# Patient Record
Sex: MALE | Race: WHITE | Employment: FULL TIME | URBAN - METROPOLITAN AREA
[De-identification: names, ages, dates, MRNs, and addresses within clinical notes are randomized per-mention and may not be internally consistent; named-entity substitution may affect disease eponyms.]

---

## 2018-04-30 ENCOUNTER — APPOINTMENT (OUTPATIENT)
Dept: CT IMAGING | Age: 48
End: 2018-04-30
Payer: OTHER MISCELLANEOUS

## 2018-04-30 ENCOUNTER — HOSPITAL ENCOUNTER (EMERGENCY)
Age: 48
Discharge: HOME | End: 2018-04-30
Payer: OTHER MISCELLANEOUS

## 2018-04-30 ENCOUNTER — APPOINTMENT (OUTPATIENT)
Dept: GENERAL RADIOLOGY | Age: 48
End: 2018-04-30
Payer: OTHER MISCELLANEOUS

## 2018-04-30 VITALS
WEIGHT: 295 LBS | TEMPERATURE: 98.2 F | HEART RATE: 72 BPM | HEIGHT: 72 IN | OXYGEN SATURATION: 96 % | DIASTOLIC BLOOD PRESSURE: 67 MMHG | RESPIRATION RATE: 20 BRPM | BODY MASS INDEX: 39.96 KG/M2 | SYSTOLIC BLOOD PRESSURE: 112 MMHG

## 2018-04-30 DIAGNOSIS — S80.212A ABRASION OF LEFT KNEE, INITIAL ENCOUNTER: ICD-10-CM
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
## 2018-04-30 DIAGNOSIS — S20.219A CONTUSION OF CHEST WALL, UNSPECIFIED LATERALITY, INITIAL ENCOUNTER: ICD-10-CM

## 2018-04-30 DIAGNOSIS — R55 VASOVAGAL SYNCOPE: Primary | ICD-10-CM

## 2018-04-30 LAB
ALBUMIN SERPL-MCNC: 4.1 G/DL (ref 3.9–4.9)
ALP BLD-CCNC: 75 U/L (ref 35–104)
ALT SERPL-CCNC: 42 U/L (ref 0–41)
ANION GAP SERPL CALCULATED.3IONS-SCNC: 13 MEQ/L (ref 7–13)
AST SERPL-CCNC: 32 U/L (ref 0–40)
BASOPHILS ABSOLUTE: 0.2 K/UL (ref 0–0.2)
BASOPHILS RELATIVE PERCENT: 1.3 %
BILIRUB SERPL-MCNC: 0.4 MG/DL (ref 0–1.2)
BILIRUBIN URINE: NEGATIVE
BLOOD, URINE: NEGATIVE
BUN BLDV-MCNC: 14 MG/DL (ref 6–20)
CALCIUM SERPL-MCNC: 9.2 MG/DL (ref 8.6–10.2)
CHLORIDE BLD-SCNC: 102 MEQ/L (ref 98–107)
CK MB: 7 NG/ML (ref 0–6.7)
CLARITY: CLEAR
CO2: 25 MEQ/L (ref 22–29)
COLOR: YELLOW
CREAT SERPL-MCNC: 0.92 MG/DL (ref 0.7–1.2)
CREATINE KINASE-MB INDEX: 1.4 % (ref 0–3.5)
EKG ATRIAL RATE: 79 BPM
EKG P AXIS: 64 DEGREES
EKG P-R INTERVAL: 180 MS
EKG Q-T INTERVAL: 352 MS
EKG QRS DURATION: 96 MS
EKG QTC CALCULATION (BAZETT): 403 MS
EKG R AXIS: -49 DEGREES
EKG T AXIS: 43 DEGREES
EKG VENTRICULAR RATE: 79 BPM
EOSINOPHILS ABSOLUTE: 0.1 K/UL (ref 0–0.7)
EOSINOPHILS RELATIVE PERCENT: 0.8 %
ETHANOL PERCENT: NORMAL G/DL
ETHANOL: <10 MG/DL (ref 0–0.08)
GFR AFRICAN AMERICAN: >60
GFR NON-AFRICAN AMERICAN: >60
GLOBULIN: 2.4 G/DL (ref 2.3–3.5)
GLUCOSE BLD-MCNC: 170 MG/DL (ref 74–109)
GLUCOSE URINE: NEGATIVE MG/DL
HCT VFR BLD CALC: 45.1 % (ref 42–52)
HEMOGLOBIN: 15.7 G/DL (ref 14–18)
KETONES, URINE: NEGATIVE MG/DL
LACTIC ACID: 1.7 MMOL/L (ref 0.5–2.2)
LEUKOCYTE ESTERASE, URINE: NEGATIVE
LIPASE: 32 U/L (ref 13–60)
LYMPHOCYTES ABSOLUTE: 3.3 K/UL (ref 1–4.8)
LYMPHOCYTES RELATIVE PERCENT: 25.8 %
MAGNESIUM: 2 MG/DL (ref 1.7–2.3)
MCH RBC QN AUTO: 34.1 PG (ref 27–31.3)
MCHC RBC AUTO-ENTMCNC: 34.9 % (ref 33–37)
MCV RBC AUTO: 97.7 FL (ref 80–100)
MONOCYTES ABSOLUTE: 1 K/UL (ref 0.2–0.8)
MONOCYTES RELATIVE PERCENT: 8.2 %
NEUTROPHILS ABSOLUTE: 8.1 K/UL (ref 1.4–6.5)
NEUTROPHILS RELATIVE PERCENT: 63.9 %
NITRITE, URINE: NEGATIVE
PDW BLD-RTO: 13.3 % (ref 11.5–14.5)
PH UA: 5 (ref 5–9)
PLATELET # BLD: 172 K/UL (ref 130–400)
POTASSIUM SERPL-SCNC: 4 MEQ/L (ref 3.5–5.1)
PROTEIN UA: NEGATIVE MG/DL
RBC # BLD: 4.61 M/UL (ref 4.7–6.1)
SODIUM BLD-SCNC: 140 MEQ/L (ref 132–144)
SPECIFIC GRAVITY UA: 1.02 (ref 1–1.03)
TOTAL CK: 488 U/L (ref 0–190)
TOTAL PROTEIN: 6.5 G/DL (ref 6.4–8.1)
TROPONIN: <0.01 NG/ML (ref 0–0.01)
URINE REFLEX TO CULTURE: NORMAL
UROBILINOGEN, URINE: 1 E.U./DL
WBC # BLD: 12.7 K/UL (ref 4.8–10.8)

## 2018-04-30 PROCEDURE — 81003 URINALYSIS AUTO W/O SCOPE: CPT

## 2018-04-30 PROCEDURE — 70450 CT HEAD/BRAIN W/O DYE: CPT

## 2018-04-30 PROCEDURE — 83735 ASSAY OF MAGNESIUM: CPT

## 2018-04-30 PROCEDURE — 73562 X-RAY EXAM OF KNEE 3: CPT

## 2018-04-30 PROCEDURE — 82550 ASSAY OF CK (CPK): CPT

## 2018-04-30 PROCEDURE — 93005 ELECTROCARDIOGRAM TRACING: CPT

## 2018-04-30 PROCEDURE — 82553 CREATINE MB FRACTION: CPT

## 2018-04-30 PROCEDURE — 83690 ASSAY OF LIPASE: CPT

## 2018-04-30 PROCEDURE — 2580000003 HC RX 258: Performed by: PHYSICIAN ASSISTANT

## 2018-04-30 PROCEDURE — 96372 THER/PROPH/DIAG INJ SC/IM: CPT

## 2018-04-30 PROCEDURE — 99284 EMERGENCY DEPT VISIT MOD MDM: CPT

## 2018-04-30 PROCEDURE — 84484 ASSAY OF TROPONIN QUANT: CPT

## 2018-04-30 PROCEDURE — G0480 DRUG TEST DEF 1-7 CLASSES: HCPCS

## 2018-04-30 PROCEDURE — 36415 COLL VENOUS BLD VENIPUNCTURE: CPT

## 2018-04-30 PROCEDURE — 6370000000 HC RX 637 (ALT 250 FOR IP): Performed by: PHYSICIAN ASSISTANT

## 2018-04-30 PROCEDURE — 83605 ASSAY OF LACTIC ACID: CPT

## 2018-04-30 PROCEDURE — 85025 COMPLETE CBC W/AUTO DIFF WBC: CPT

## 2018-04-30 PROCEDURE — 71046 X-RAY EXAM CHEST 2 VIEWS: CPT

## 2018-04-30 PROCEDURE — 80053 COMPREHEN METABOLIC PANEL: CPT

## 2018-04-30 PROCEDURE — 6360000002 HC RX W HCPCS: Performed by: PHYSICIAN ASSISTANT

## 2018-04-30 RX ORDER — 0.9 % SODIUM CHLORIDE 0.9 %
1000 INTRAVENOUS SOLUTION INTRAVENOUS ONCE
Status: COMPLETED | OUTPATIENT
Start: 2018-04-30 | End: 2018-04-30

## 2018-04-30 RX ORDER — DIAPER,BRIEF,INFANT-TODD,DISP
EACH MISCELLANEOUS ONCE
Status: COMPLETED | OUTPATIENT
Start: 2018-04-30 | End: 2018-04-30

## 2018-04-30 RX ORDER — ETODOLAC 400 MG/1
400 TABLET, FILM COATED ORAL 2 TIMES DAILY
Qty: 14 TABLET | Refills: 0 | Status: SHIPPED | OUTPATIENT
Start: 2018-04-30 | End: 2018-05-02

## 2018-04-30 RX ORDER — KETOROLAC TROMETHAMINE 30 MG/ML
60 INJECTION, SOLUTION INTRAMUSCULAR; INTRAVENOUS ONCE
Status: COMPLETED | OUTPATIENT
Start: 2018-04-30 | End: 2018-04-30

## 2018-04-30 RX ADMIN — KETOROLAC TROMETHAMINE 60 MG: 30 INJECTION, SOLUTION INTRAMUSCULAR at 22:27

## 2018-04-30 RX ADMIN — SODIUM CHLORIDE 1000 ML: 9 INJECTION, SOLUTION INTRAVENOUS at 21:25

## 2018-04-30 RX ADMIN — BACITRACIN ZINC 1 G: 500 OINTMENT TOPICAL at 22:27

## 2018-04-30 ASSESSMENT — ENCOUNTER SYMPTOMS
ABDOMINAL PAIN: 0
COLOR CHANGE: 0
SHORTNESS OF BREATH: 0
EYE PAIN: 0
TROUBLE SWALLOWING: 0
APNEA: 0
ALLERGIC/IMMUNOLOGIC NEGATIVE: 1

## 2018-04-30 ASSESSMENT — PAIN SCALES - GENERAL
PAINLEVEL_OUTOF10: 4
PAINLEVEL_OUTOF10: 4

## 2018-04-30 ASSESSMENT — PAIN DESCRIPTION - LOCATION: LOCATION: CHEST

## 2018-04-30 ASSESSMENT — PAIN DESCRIPTION - PAIN TYPE: TYPE: ACUTE PAIN

## 2018-05-01 ENCOUNTER — OFFICE VISIT (OUTPATIENT)
Dept: CARDIOLOGY CLINIC | Age: 48
End: 2018-05-01
Payer: COMMERCIAL

## 2018-05-01 VITALS
WEIGHT: 295.8 LBS | BODY MASS INDEX: 40.06 KG/M2 | HEART RATE: 74 BPM | HEIGHT: 72 IN | RESPIRATION RATE: 20 BRPM | SYSTOLIC BLOOD PRESSURE: 116 MMHG | DIASTOLIC BLOOD PRESSURE: 66 MMHG | TEMPERATURE: 97.1 F | OXYGEN SATURATION: 93 %

## 2018-05-01 DIAGNOSIS — R55 VASOVAGAL SYNCOPE: Primary | ICD-10-CM

## 2018-05-01 PROCEDURE — 93010 ELECTROCARDIOGRAM REPORT: CPT | Performed by: INTERNAL MEDICINE

## 2018-05-01 PROCEDURE — 99204 OFFICE O/P NEW MOD 45 MIN: CPT | Performed by: INTERNAL MEDICINE

## 2018-05-01 RX ORDER — FLUDROCORTISONE ACETATE 0.1 MG/1
0.1 TABLET ORAL 2 TIMES DAILY
Qty: 60 TABLET | Refills: 5 | Status: SHIPPED | OUTPATIENT
Start: 2018-05-01 | End: 2019-02-01

## 2018-05-01 RX ORDER — METOPROLOL SUCCINATE 25 MG/1
25 TABLET, EXTENDED RELEASE ORAL NIGHTLY
Qty: 30 TABLET | Refills: 5 | Status: SHIPPED | OUTPATIENT
Start: 2018-05-01 | End: 2018-10-29 | Stop reason: SDUPTHER

## 2018-05-01 ASSESSMENT — ENCOUNTER SYMPTOMS
SHORTNESS OF BREATH: 0
NAUSEA: 0
APNEA: 0
VOMITING: 0
CHEST TIGHTNESS: 0

## 2018-05-02 ENCOUNTER — OFFICE VISIT (OUTPATIENT)
Dept: FAMILY MEDICINE CLINIC | Age: 48
End: 2018-05-02
Payer: COMMERCIAL

## 2018-05-02 VITALS
WEIGHT: 294 LBS | HEART RATE: 70 BPM | HEIGHT: 72 IN | TEMPERATURE: 98.9 F | RESPIRATION RATE: 14 BRPM | DIASTOLIC BLOOD PRESSURE: 80 MMHG | SYSTOLIC BLOOD PRESSURE: 132 MMHG | BODY MASS INDEX: 39.82 KG/M2

## 2018-05-02 DIAGNOSIS — Z13.220 SCREENING, LIPID: ICD-10-CM

## 2018-05-02 DIAGNOSIS — R55 VASOVAGAL SYNCOPE: Primary | ICD-10-CM

## 2018-05-02 DIAGNOSIS — R53.83 OTHER FATIGUE: ICD-10-CM

## 2018-05-02 DIAGNOSIS — Z99.89 OSA ON CPAP: ICD-10-CM

## 2018-05-02 DIAGNOSIS — G47.33 OSA ON CPAP: ICD-10-CM

## 2018-05-02 DIAGNOSIS — R55 VASOVAGAL SYNCOPE: ICD-10-CM

## 2018-05-02 DIAGNOSIS — R73.01 ELEVATED FASTING GLUCOSE: ICD-10-CM

## 2018-05-02 DIAGNOSIS — G56.03 BILATERAL CARPAL TUNNEL SYNDROME: ICD-10-CM

## 2018-05-02 LAB
ALBUMIN SERPL-MCNC: 4 G/DL (ref 3.9–4.9)
ALP BLD-CCNC: 76 U/L (ref 35–104)
ALT SERPL-CCNC: 33 U/L (ref 0–41)
ANION GAP SERPL CALCULATED.3IONS-SCNC: 14 MEQ/L (ref 7–13)
AST SERPL-CCNC: 17 U/L (ref 0–40)
BILIRUB SERPL-MCNC: 0.4 MG/DL (ref 0–1.2)
BUN BLDV-MCNC: 13 MG/DL (ref 6–20)
CALCIUM SERPL-MCNC: 9.1 MG/DL (ref 8.6–10.2)
CHLORIDE BLD-SCNC: 102 MEQ/L (ref 98–107)
CHOLESTEROL, TOTAL: 206 MG/DL (ref 0–199)
CO2: 26 MEQ/L (ref 22–29)
CREAT SERPL-MCNC: 0.85 MG/DL (ref 0.7–1.2)
GFR AFRICAN AMERICAN: >60
GFR NON-AFRICAN AMERICAN: >60
GLOBULIN: 2.4 G/DL (ref 2.3–3.5)
GLUCOSE BLD-MCNC: 178 MG/DL (ref 74–109)
HBA1C MFR BLD: 8.3 % (ref 4.8–5.9)
HDLC SERPL-MCNC: 36 MG/DL (ref 40–59)
LDL CHOLESTEROL CALCULATED: 138 MG/DL (ref 0–129)
POTASSIUM SERPL-SCNC: 4.2 MEQ/L (ref 3.5–5.1)
SODIUM BLD-SCNC: 142 MEQ/L (ref 132–144)
TOTAL PROTEIN: 6.4 G/DL (ref 6.4–8.1)
TRIGL SERPL-MCNC: 158 MG/DL (ref 0–200)
TSH REFLEX: 2.04 UIU/ML (ref 0.27–4.2)

## 2018-05-02 PROCEDURE — 99204 OFFICE O/P NEW MOD 45 MIN: CPT | Performed by: NURSE PRACTITIONER

## 2018-05-02 ASSESSMENT — ENCOUNTER SYMPTOMS
SWOLLEN GLANDS: 0
ABDOMINAL PAIN: 0
VOMITING: 0
VISUAL CHANGE: 0
SORE THROAT: 0
NAUSEA: 0
COUGH: 0
CHANGE IN BOWEL HABIT: 0

## 2018-05-02 ASSESSMENT — PATIENT HEALTH QUESTIONNAIRE - PHQ9
2. FEELING DOWN, DEPRESSED OR HOPELESS: 0
1. LITTLE INTEREST OR PLEASURE IN DOING THINGS: 0
SUM OF ALL RESPONSES TO PHQ9 QUESTIONS 1 & 2: 0
SUM OF ALL RESPONSES TO PHQ QUESTIONS 1-9: 0

## 2018-05-04 ENCOUNTER — TELEPHONE (OUTPATIENT)
Dept: CARDIOLOGY CLINIC | Age: 48
End: 2018-05-04

## 2018-05-04 ENCOUNTER — OFFICE VISIT (OUTPATIENT)
Dept: FAMILY MEDICINE CLINIC | Age: 48
End: 2018-05-04
Payer: COMMERCIAL

## 2018-05-04 DIAGNOSIS — E11.9 TYPE 2 DIABETES MELLITUS WITHOUT COMPLICATION, WITHOUT LONG-TERM CURRENT USE OF INSULIN (HCC): Primary | ICD-10-CM

## 2018-05-04 DIAGNOSIS — E11.9 TYPE 2 DIABETES MELLITUS WITHOUT COMPLICATION, WITHOUT LONG-TERM CURRENT USE OF INSULIN (HCC): ICD-10-CM

## 2018-05-04 DIAGNOSIS — R79.89 LOW TESTOSTERONE IN MALE: ICD-10-CM

## 2018-05-04 LAB
DHEAS (DHEA SULFATE): 212 UG/DL (ref 95–530)
SEX HORMONE BINDING GLOBULIN: 20 NMOL/L (ref 11–80)
TESTOSTERONE FREE PERCENT: 2.2 % (ref 1.6–2.9)
TESTOSTERONE FREE, CALC: 47 PG/ML (ref 47–244)
TESTOSTERONE TOTAL-MALE: 211 NG/DL (ref 300–890)

## 2018-05-04 PROCEDURE — 96372 THER/PROPH/DIAG INJ SC/IM: CPT | Performed by: NURSE PRACTITIONER

## 2018-05-04 PROCEDURE — 99214 OFFICE O/P EST MOD 30 MIN: CPT | Performed by: NURSE PRACTITIONER

## 2018-05-04 RX ORDER — METFORMIN HYDROCHLORIDE 500 MG/1
500 TABLET, EXTENDED RELEASE ORAL
Qty: 60 TABLET | Refills: 3 | Status: SHIPPED | OUTPATIENT
Start: 2018-05-04 | End: 2018-12-27 | Stop reason: SDUPTHER

## 2018-05-04 RX ORDER — TESTOSTERONE CYPIONATE 200 MG/ML
200 INJECTION INTRAMUSCULAR ONCE
Status: COMPLETED | OUTPATIENT
Start: 2018-05-04 | End: 2018-05-04

## 2018-05-04 RX ADMIN — TESTOSTERONE CYPIONATE 200 MG: 200 INJECTION INTRAMUSCULAR at 13:59

## 2018-05-05 LAB
CREATININE URINE: 285.1 MG/DL
MICROALBUMIN UR-MCNC: <1.2 MG/DL
MICROALBUMIN/CREAT UR-RTO: NORMAL MG/G (ref 0–30)

## 2018-05-06 LAB — 17-OH PROGESTERONE LCMS: 27.3 NG/DL

## 2018-05-14 VITALS
BODY MASS INDEX: 39.82 KG/M2 | DIASTOLIC BLOOD PRESSURE: 70 MMHG | SYSTOLIC BLOOD PRESSURE: 130 MMHG | RESPIRATION RATE: 14 BRPM | HEIGHT: 72 IN | WEIGHT: 294 LBS | OXYGEN SATURATION: 98 % | HEART RATE: 83 BPM | TEMPERATURE: 99 F

## 2018-05-14 ASSESSMENT — ENCOUNTER SYMPTOMS
SORE THROAT: 0
SWOLLEN GLANDS: 0
VISUAL CHANGE: 0
NAUSEA: 0
VOMITING: 0
ABDOMINAL PAIN: 0
COUGH: 0
CHANGE IN BOWEL HABIT: 0

## 2018-05-15 ENCOUNTER — HOSPITAL ENCOUNTER (OUTPATIENT)
Dept: NUCLEAR MEDICINE | Age: 48
Discharge: HOME | End: 2018-05-17
Payer: COMMERCIAL

## 2018-05-15 ENCOUNTER — HOSPITAL ENCOUNTER (OUTPATIENT)
Dept: NON INVASIVE DIAGNOSTICS | Age: 48
Discharge: HOME | End: 2018-05-15
Payer: COMMERCIAL

## 2018-05-15 DIAGNOSIS — R55 VASOVAGAL SYNCOPE: ICD-10-CM

## 2018-05-15 LAB
LV EF: 60 %
LVEF MODALITY: NORMAL

## 2018-05-15 PROCEDURE — 3430000000 HC RX DIAGNOSTIC RADIOPHARMACEUTICAL: Performed by: INTERNAL MEDICINE

## 2018-05-15 PROCEDURE — 93306 TTE W/DOPPLER COMPLETE: CPT

## 2018-05-15 PROCEDURE — A9502 TC99M TETROFOSMIN: HCPCS | Performed by: INTERNAL MEDICINE

## 2018-05-15 PROCEDURE — 78452 HT MUSCLE IMAGE SPECT MULT: CPT

## 2018-05-15 PROCEDURE — 6360000002 HC RX W HCPCS: Performed by: INTERNAL MEDICINE

## 2018-05-15 PROCEDURE — 2580000003 HC RX 258: Performed by: INTERNAL MEDICINE

## 2018-05-15 PROCEDURE — 93017 CV STRESS TEST TRACING ONLY: CPT

## 2018-05-15 RX ORDER — SODIUM CHLORIDE 0.9 % (FLUSH) 0.9 %
10 SYRINGE (ML) INJECTION PRN
Status: DISCONTINUED | OUTPATIENT
Start: 2018-05-15 | End: 2018-05-18 | Stop reason: HOSPADM

## 2018-05-15 RX ADMIN — TETROFOSMIN 31.6 MILLICURIE: 0.23 INJECTION, POWDER, LYOPHILIZED, FOR SOLUTION INTRAVENOUS at 09:24

## 2018-05-15 RX ADMIN — Medication 10 ML: at 09:23

## 2018-05-15 RX ADMIN — Medication 10 ML: at 09:24

## 2018-05-15 RX ADMIN — REGADENOSON 0.4 MG: 0.08 INJECTION, SOLUTION INTRAVENOUS at 09:23

## 2018-05-16 PROCEDURE — 3430000000 HC RX DIAGNOSTIC RADIOPHARMACEUTICAL: Performed by: INTERNAL MEDICINE

## 2018-05-16 PROCEDURE — 93018 CV STRESS TEST I&R ONLY: CPT | Performed by: INTERNAL MEDICINE

## 2018-05-16 PROCEDURE — A9502 TC99M TETROFOSMIN: HCPCS | Performed by: INTERNAL MEDICINE

## 2018-05-16 RX ADMIN — TETROFOSMIN 36 MILLICURIE: 0.23 INJECTION, POWDER, LYOPHILIZED, FOR SOLUTION INTRAVENOUS at 09:00

## 2018-05-22 ENCOUNTER — OFFICE VISIT (OUTPATIENT)
Dept: CARDIOLOGY CLINIC | Age: 48
End: 2018-05-22
Payer: COMMERCIAL

## 2018-05-22 VITALS
SYSTOLIC BLOOD PRESSURE: 128 MMHG | BODY MASS INDEX: 40.63 KG/M2 | DIASTOLIC BLOOD PRESSURE: 84 MMHG | OXYGEN SATURATION: 98 % | WEIGHT: 300 LBS | HEART RATE: 89 BPM | HEIGHT: 72 IN | TEMPERATURE: 97.9 F | RESPIRATION RATE: 14 BRPM

## 2018-05-22 DIAGNOSIS — R55 VASOVAGAL SYNCOPE: Primary | ICD-10-CM

## 2018-05-22 PROCEDURE — 99213 OFFICE O/P EST LOW 20 MIN: CPT | Performed by: INTERNAL MEDICINE

## 2018-05-22 ASSESSMENT — ENCOUNTER SYMPTOMS
NAUSEA: 0
APNEA: 0
VOMITING: 0
SHORTNESS OF BREATH: 0
CHEST TIGHTNESS: 0

## 2018-05-23 ENCOUNTER — TELEPHONE (OUTPATIENT)
Dept: FAMILY MEDICINE CLINIC | Age: 48
End: 2018-05-23

## 2018-06-07 ENCOUNTER — NURSE ONLY (OUTPATIENT)
Dept: FAMILY MEDICINE CLINIC | Age: 48
End: 2018-06-07
Payer: COMMERCIAL

## 2018-06-07 DIAGNOSIS — E29.1 HYPOGONADISM IN MALE: Primary | ICD-10-CM

## 2018-06-07 PROCEDURE — 96372 THER/PROPH/DIAG INJ SC/IM: CPT | Performed by: FAMILY MEDICINE

## 2018-06-07 RX ORDER — TESTOSTERONE CYPIONATE 200 MG/ML
200 INJECTION INTRAMUSCULAR ONCE
Status: COMPLETED | OUTPATIENT
Start: 2018-06-07 | End: 2018-06-07

## 2018-06-07 RX ORDER — TESTOSTERONE CYPIONATE 200 MG/ML
200 INJECTION INTRAMUSCULAR
COMMUNITY
End: 2019-10-10

## 2018-06-07 RX ADMIN — TESTOSTERONE CYPIONATE 200 MG: 200 INJECTION INTRAMUSCULAR at 14:19

## 2018-06-15 ENCOUNTER — OFFICE VISIT (OUTPATIENT)
Dept: FAMILY MEDICINE CLINIC | Age: 48
End: 2018-06-15
Payer: COMMERCIAL

## 2018-06-15 VITALS
TEMPERATURE: 97.9 F | OXYGEN SATURATION: 99 % | BODY MASS INDEX: 38.74 KG/M2 | RESPIRATION RATE: 22 BRPM | SYSTOLIC BLOOD PRESSURE: 132 MMHG | HEART RATE: 98 BPM | HEIGHT: 72 IN | DIASTOLIC BLOOD PRESSURE: 84 MMHG | WEIGHT: 286 LBS

## 2018-06-15 DIAGNOSIS — E29.1 HYPOGONADISM IN MALE: ICD-10-CM

## 2018-06-15 DIAGNOSIS — E11.9 TYPE 2 DIABETES MELLITUS WITHOUT COMPLICATION, WITHOUT LONG-TERM CURRENT USE OF INSULIN (HCC): Primary | ICD-10-CM

## 2018-06-15 DIAGNOSIS — I10 ESSENTIAL HYPERTENSION: ICD-10-CM

## 2018-06-15 DIAGNOSIS — E11.9 TYPE 2 DIABETES MELLITUS WITHOUT COMPLICATION, WITHOUT LONG-TERM CURRENT USE OF INSULIN (HCC): ICD-10-CM

## 2018-06-15 LAB — HBA1C MFR BLD: 8.2 % (ref 4.8–5.9)

## 2018-06-15 PROCEDURE — 99214 OFFICE O/P EST MOD 30 MIN: CPT | Performed by: NURSE PRACTITIONER

## 2018-06-17 LAB
SEX HORMONE BINDING GLOBULIN: 20 NMOL/L (ref 11–80)
TESTOSTERONE FREE PERCENT: 2.3 % (ref 1.6–2.9)
TESTOSTERONE FREE, CALC: 96 PG/ML (ref 47–244)
TESTOSTERONE TOTAL-MALE: 410 NG/DL (ref 300–890)

## 2018-06-18 ASSESSMENT — ENCOUNTER SYMPTOMS
COUGH: 0
NAUSEA: 0
SORE THROAT: 0
VOMITING: 0
SWOLLEN GLANDS: 0
ABDOMINAL PAIN: 0
VISUAL CHANGE: 0
CHANGE IN BOWEL HABIT: 0

## 2018-06-21 DIAGNOSIS — G47.33 OSA ON CPAP: Primary | ICD-10-CM

## 2018-06-21 DIAGNOSIS — Z99.89 OSA ON CPAP: Primary | ICD-10-CM

## 2018-07-05 ENCOUNTER — NURSE ONLY (OUTPATIENT)
Dept: FAMILY MEDICINE CLINIC | Age: 48
End: 2018-07-05
Payer: COMMERCIAL

## 2018-07-05 DIAGNOSIS — E29.1 HYPOGONADISM IN MALE: Primary | ICD-10-CM

## 2018-07-05 PROCEDURE — 96372 THER/PROPH/DIAG INJ SC/IM: CPT | Performed by: FAMILY MEDICINE

## 2018-07-05 RX ORDER — TESTOSTERONE CYPIONATE 200 MG/ML
200 INJECTION INTRAMUSCULAR ONCE
Status: COMPLETED | OUTPATIENT
Start: 2018-07-05 | End: 2018-07-05

## 2018-07-05 RX ADMIN — TESTOSTERONE CYPIONATE 200 MG: 200 INJECTION INTRAMUSCULAR at 13:41

## 2018-07-20 ENCOUNTER — HOSPITAL ENCOUNTER (OUTPATIENT)
Dept: SLEEP CENTER | Age: 48
Discharge: HOME | End: 2018-07-22
Payer: COMMERCIAL

## 2018-07-20 PROCEDURE — 95811 POLYSOM 6/>YRS CPAP 4/> PARM: CPT

## 2018-08-02 ENCOUNTER — NURSE ONLY (OUTPATIENT)
Dept: FAMILY MEDICINE CLINIC | Age: 48
End: 2018-08-02
Payer: COMMERCIAL

## 2018-08-02 DIAGNOSIS — E29.1 HYPOGONADISM IN MALE: Primary | ICD-10-CM

## 2018-08-02 PROCEDURE — 96372 THER/PROPH/DIAG INJ SC/IM: CPT | Performed by: NURSE PRACTITIONER

## 2018-08-02 RX ORDER — TESTOSTERONE CYPIONATE 200 MG/ML
200 INJECTION INTRAMUSCULAR ONCE
Status: COMPLETED | OUTPATIENT
Start: 2018-08-02 | End: 2018-08-02

## 2018-08-02 RX ADMIN — TESTOSTERONE CYPIONATE 200 MG: 200 INJECTION INTRAMUSCULAR at 13:41

## 2018-08-30 ENCOUNTER — TELEPHONE (OUTPATIENT)
Dept: FAMILY MEDICINE CLINIC | Age: 48
End: 2018-08-30

## 2018-08-30 ENCOUNTER — NURSE ONLY (OUTPATIENT)
Dept: FAMILY MEDICINE CLINIC | Age: 48
End: 2018-08-30
Payer: COMMERCIAL

## 2018-08-30 DIAGNOSIS — G47.33 OBSTRUCTIVE SLEEP APNEA SYNDROME: Primary | ICD-10-CM

## 2018-08-30 DIAGNOSIS — E29.1 HYPOGONADISM IN MALE: Primary | ICD-10-CM

## 2018-08-30 PROCEDURE — 96372 THER/PROPH/DIAG INJ SC/IM: CPT | Performed by: FAMILY MEDICINE

## 2018-08-30 RX ORDER — TESTOSTERONE CYPIONATE 200 MG/ML
200 INJECTION INTRAMUSCULAR ONCE
Status: COMPLETED | OUTPATIENT
Start: 2018-08-30 | End: 2018-08-30

## 2018-08-30 RX ADMIN — TESTOSTERONE CYPIONATE 200 MG: 200 INJECTION INTRAMUSCULAR at 13:56

## 2018-09-27 ENCOUNTER — NURSE ONLY (OUTPATIENT)
Dept: FAMILY MEDICINE CLINIC | Age: 48
End: 2018-09-27
Payer: COMMERCIAL

## 2018-09-27 DIAGNOSIS — E29.1 HYPOGONADISM IN MALE: Primary | ICD-10-CM

## 2018-09-27 PROCEDURE — 96372 THER/PROPH/DIAG INJ SC/IM: CPT | Performed by: FAMILY MEDICINE

## 2018-09-27 RX ORDER — TESTOSTERONE CYPIONATE 200 MG/ML
200 INJECTION INTRAMUSCULAR ONCE
Status: COMPLETED | OUTPATIENT
Start: 2018-09-27 | End: 2018-09-27

## 2018-09-27 RX ADMIN — TESTOSTERONE CYPIONATE 200 MG: 200 INJECTION INTRAMUSCULAR at 18:00

## 2018-10-29 RX ORDER — METOPROLOL SUCCINATE 25 MG/1
25 TABLET, EXTENDED RELEASE ORAL NIGHTLY
Qty: 90 TABLET | Refills: 3 | Status: SHIPPED | OUTPATIENT
Start: 2018-10-29 | End: 2019-02-01

## 2018-12-06 ENCOUNTER — NURSE ONLY (OUTPATIENT)
Dept: FAMILY MEDICINE CLINIC | Age: 48
End: 2018-12-06
Payer: COMMERCIAL

## 2018-12-06 DIAGNOSIS — R79.89 LOW TESTOSTERONE IN MALE: Primary | ICD-10-CM

## 2018-12-06 PROCEDURE — 96372 THER/PROPH/DIAG INJ SC/IM: CPT | Performed by: FAMILY MEDICINE

## 2018-12-06 RX ORDER — TESTOSTERONE CYPIONATE 200 MG/ML
200 INJECTION INTRAMUSCULAR ONCE
Status: COMPLETED | OUTPATIENT
Start: 2018-12-06 | End: 2018-12-06

## 2018-12-06 RX ADMIN — TESTOSTERONE CYPIONATE 200 MG: 200 INJECTION INTRAMUSCULAR at 17:20

## 2018-12-27 DIAGNOSIS — R79.89 LOW TESTOSTERONE IN MALE: ICD-10-CM

## 2018-12-27 RX ORDER — METFORMIN HYDROCHLORIDE 500 MG/1
TABLET, EXTENDED RELEASE ORAL
Qty: 60 TABLET | Refills: 3 | Status: SHIPPED | OUTPATIENT
Start: 2018-12-27 | End: 2019-09-23 | Stop reason: SDUPTHER

## 2019-01-03 ENCOUNTER — NURSE ONLY (OUTPATIENT)
Dept: FAMILY MEDICINE CLINIC | Age: 49
End: 2019-01-03
Payer: COMMERCIAL

## 2019-01-03 DIAGNOSIS — E29.1 HYPOGONADISM IN MALE: Primary | ICD-10-CM

## 2019-01-03 PROCEDURE — 96372 THER/PROPH/DIAG INJ SC/IM: CPT | Performed by: FAMILY MEDICINE

## 2019-01-03 RX ORDER — TESTOSTERONE CYPIONATE 200 MG/ML
200 INJECTION INTRAMUSCULAR ONCE
Status: COMPLETED | OUTPATIENT
Start: 2019-01-03 | End: 2019-01-03

## 2019-01-03 RX ADMIN — TESTOSTERONE CYPIONATE 200 MG: 200 INJECTION INTRAMUSCULAR at 17:28

## 2019-01-31 ENCOUNTER — NURSE ONLY (OUTPATIENT)
Dept: FAMILY MEDICINE CLINIC | Age: 49
End: 2019-01-31
Payer: COMMERCIAL

## 2019-01-31 DIAGNOSIS — E29.1 HYPOGONADISM IN MALE: Primary | ICD-10-CM

## 2019-01-31 PROCEDURE — 96372 THER/PROPH/DIAG INJ SC/IM: CPT | Performed by: FAMILY MEDICINE

## 2019-01-31 RX ORDER — TESTOSTERONE CYPIONATE 200 MG/ML
200 INJECTION INTRAMUSCULAR ONCE
Status: COMPLETED | OUTPATIENT
Start: 2019-01-31 | End: 2019-01-31

## 2019-01-31 RX ADMIN — TESTOSTERONE CYPIONATE 200 MG: 200 INJECTION INTRAMUSCULAR at 16:57

## 2019-02-01 ENCOUNTER — OFFICE VISIT (OUTPATIENT)
Dept: FAMILY MEDICINE CLINIC | Age: 49
End: 2019-02-01
Payer: COMMERCIAL

## 2019-02-01 VITALS
HEIGHT: 72 IN | BODY MASS INDEX: 35.49 KG/M2 | HEART RATE: 92 BPM | OXYGEN SATURATION: 96 % | WEIGHT: 262 LBS | DIASTOLIC BLOOD PRESSURE: 76 MMHG | SYSTOLIC BLOOD PRESSURE: 126 MMHG

## 2019-02-01 DIAGNOSIS — G47.30 SLEEP APNEA, UNSPECIFIED TYPE: ICD-10-CM

## 2019-02-01 DIAGNOSIS — E11.9 TYPE 2 DIABETES MELLITUS WITHOUT COMPLICATION, WITHOUT LONG-TERM CURRENT USE OF INSULIN (HCC): ICD-10-CM

## 2019-02-01 DIAGNOSIS — R55 VASOVAGAL SYNCOPE: Primary | ICD-10-CM

## 2019-02-01 DIAGNOSIS — R05.9 COUGH: ICD-10-CM

## 2019-02-01 DIAGNOSIS — L91.8 SKIN TAGS, MULTIPLE ACQUIRED: ICD-10-CM

## 2019-02-01 DIAGNOSIS — Z11.4 SCREENING FOR HIV (HUMAN IMMUNODEFICIENCY VIRUS): ICD-10-CM

## 2019-02-01 DIAGNOSIS — E29.1 HYPOGONADISM IN MALE: ICD-10-CM

## 2019-02-01 DIAGNOSIS — R53.83 FATIGUE, UNSPECIFIED TYPE: ICD-10-CM

## 2019-02-01 PROCEDURE — 99214 OFFICE O/P EST MOD 30 MIN: CPT | Performed by: FAMILY MEDICINE

## 2019-02-01 RX ORDER — BENZONATATE 200 MG/1
200 CAPSULE ORAL 3 TIMES DAILY PRN
Qty: 30 CAPSULE | Refills: 0 | Status: SHIPPED | OUTPATIENT
Start: 2019-02-01 | End: 2019-02-08

## 2019-02-14 DIAGNOSIS — R53.83 FATIGUE, UNSPECIFIED TYPE: ICD-10-CM

## 2019-02-14 DIAGNOSIS — Z11.4 SCREENING FOR HIV (HUMAN IMMUNODEFICIENCY VIRUS): ICD-10-CM

## 2019-02-14 DIAGNOSIS — E29.1 HYPOGONADISM IN MALE: ICD-10-CM

## 2019-02-14 DIAGNOSIS — E11.9 TYPE 2 DIABETES MELLITUS WITHOUT COMPLICATION, WITHOUT LONG-TERM CURRENT USE OF INSULIN (HCC): ICD-10-CM

## 2019-02-14 LAB
ALBUMIN SERPL-MCNC: 4.2 G/DL (ref 3.5–4.6)
ALP BLD-CCNC: 75 U/L (ref 35–104)
ALT SERPL-CCNC: 27 U/L (ref 0–41)
ANION GAP SERPL CALCULATED.3IONS-SCNC: 18 MEQ/L (ref 9–15)
AST SERPL-CCNC: 21 U/L (ref 0–40)
BILIRUB SERPL-MCNC: 0.3 MG/DL (ref 0.2–0.7)
BUN BLDV-MCNC: 16 MG/DL (ref 6–20)
CALCIUM SERPL-MCNC: 9.4 MG/DL (ref 8.5–9.9)
CHLORIDE BLD-SCNC: 99 MEQ/L (ref 95–107)
CHOLESTEROL, TOTAL: 176 MG/DL (ref 0–199)
CO2: 25 MEQ/L (ref 20–31)
CREAT SERPL-MCNC: 0.8 MG/DL (ref 0.7–1.2)
GFR AFRICAN AMERICAN: >60
GFR NON-AFRICAN AMERICAN: >60
GLOBULIN: 3.1 G/DL (ref 2.3–3.5)
GLUCOSE BLD-MCNC: 139 MG/DL (ref 70–99)
HBA1C MFR BLD: 6.6 % (ref 4.8–5.9)
HDLC SERPL-MCNC: 35 MG/DL (ref 40–59)
LDL CHOLESTEROL CALCULATED: 115 MG/DL (ref 0–129)
POTASSIUM SERPL-SCNC: 3.6 MEQ/L (ref 3.4–4.9)
SODIUM BLD-SCNC: 142 MEQ/L (ref 135–144)
TOTAL PROTEIN: 7.3 G/DL (ref 6.3–8)
TRIGL SERPL-MCNC: 132 MG/DL (ref 0–150)
TSH SERPL DL<=0.05 MIU/L-ACNC: 1.5 UIU/ML (ref 0.44–3.86)

## 2019-02-17 LAB
HIV 1,2 COMBO ANTIGEN/ANTIBODY: NEGATIVE
TESTOSTERONE TOTAL-MALE: 437 NG/DL (ref 300–890)

## 2019-03-20 ASSESSMENT — PAIN DESCRIPTION - PAIN TYPE: TYPE: ACUTE PAIN

## 2019-03-20 ASSESSMENT — PAIN DESCRIPTION - LOCATION: LOCATION: FLANK

## 2019-03-20 ASSESSMENT — PAIN SCALES - GENERAL: PAINLEVEL_OUTOF10: 8

## 2019-03-20 ASSESSMENT — PAIN DESCRIPTION - ORIENTATION: ORIENTATION: RIGHT

## 2019-03-20 ASSESSMENT — PAIN DESCRIPTION - FREQUENCY: FREQUENCY: CONTINUOUS

## 2019-03-20 ASSESSMENT — PAIN DESCRIPTION - DESCRIPTORS: DESCRIPTORS: SHARP;STABBING

## 2019-03-21 ENCOUNTER — APPOINTMENT (OUTPATIENT)
Dept: CT IMAGING | Age: 49
End: 2019-03-21
Payer: COMMERCIAL

## 2019-03-21 ENCOUNTER — NURSE ONLY (OUTPATIENT)
Dept: FAMILY MEDICINE CLINIC | Age: 49
End: 2019-03-21
Payer: COMMERCIAL

## 2019-03-21 ENCOUNTER — HOSPITAL ENCOUNTER (EMERGENCY)
Age: 49
Discharge: HOME | End: 2019-03-21
Payer: COMMERCIAL

## 2019-03-21 VITALS
DIASTOLIC BLOOD PRESSURE: 64 MMHG | HEART RATE: 62 BPM | OXYGEN SATURATION: 99 % | WEIGHT: 260 LBS | SYSTOLIC BLOOD PRESSURE: 110 MMHG | TEMPERATURE: 97.9 F | BODY MASS INDEX: 35.21 KG/M2 | RESPIRATION RATE: 20 BRPM | HEIGHT: 72 IN

## 2019-03-21 DIAGNOSIS — N20.0 KIDNEY STONE: Primary | ICD-10-CM

## 2019-03-21 DIAGNOSIS — E29.1 HYPOGONADISM IN MALE: Primary | ICD-10-CM

## 2019-03-21 LAB
ALBUMIN SERPL-MCNC: 4.1 G/DL (ref 3.5–4.6)
ALP BLD-CCNC: 80 U/L (ref 35–104)
ALT SERPL-CCNC: 23 U/L (ref 0–41)
ANION GAP SERPL CALCULATED.3IONS-SCNC: 13 MEQ/L (ref 9–15)
AST SERPL-CCNC: 19 U/L (ref 0–40)
BASOPHILS ABSOLUTE: 0.1 K/UL (ref 0–0.2)
BASOPHILS RELATIVE PERCENT: 0.9 %
BILIRUB SERPL-MCNC: <0.2 MG/DL (ref 0.2–0.7)
BILIRUBIN URINE: NEGATIVE
BLOOD, URINE: NEGATIVE
BUN BLDV-MCNC: 20 MG/DL (ref 6–20)
CALCIUM SERPL-MCNC: 9.1 MG/DL (ref 8.5–9.9)
CHLORIDE BLD-SCNC: 102 MEQ/L (ref 95–107)
CLARITY: CLEAR
CO2: 25 MEQ/L (ref 20–31)
COLOR: YELLOW
CREAT SERPL-MCNC: 0.96 MG/DL (ref 0.7–1.2)
EOSINOPHILS ABSOLUTE: 0.2 K/UL (ref 0–0.7)
EOSINOPHILS RELATIVE PERCENT: 1.6 %
GFR AFRICAN AMERICAN: >60
GFR NON-AFRICAN AMERICAN: >60
GLOBULIN: 2.6 G/DL (ref 2.3–3.5)
GLUCOSE BLD-MCNC: 172 MG/DL (ref 70–99)
GLUCOSE URINE: NEGATIVE MG/DL
HCT VFR BLD CALC: 48.3 % (ref 42–52)
HEMOGLOBIN: 16.6 G/DL (ref 14–18)
KETONES, URINE: NEGATIVE MG/DL
LEUKOCYTE ESTERASE, URINE: NEGATIVE
LYMPHOCYTES ABSOLUTE: 4.6 K/UL (ref 1–4.8)
LYMPHOCYTES RELATIVE PERCENT: 44 %
MCH RBC QN AUTO: 33.6 PG (ref 27–31.3)
MCHC RBC AUTO-ENTMCNC: 34.3 % (ref 33–37)
MCV RBC AUTO: 97.9 FL (ref 80–100)
MONOCYTES ABSOLUTE: 1.1 K/UL (ref 0.2–0.8)
MONOCYTES RELATIVE PERCENT: 10.9 %
NEUTROPHILS ABSOLUTE: 4.5 K/UL (ref 1.4–6.5)
NEUTROPHILS RELATIVE PERCENT: 42.6 %
NITRITE, URINE: NEGATIVE
PDW BLD-RTO: 13.5 % (ref 11.5–14.5)
PH UA: 5 (ref 5–9)
PLATELET # BLD: 166 K/UL (ref 130–400)
POTASSIUM SERPL-SCNC: 3.9 MEQ/L (ref 3.4–4.9)
PROTEIN UA: NEGATIVE MG/DL
RBC # BLD: 4.94 M/UL (ref 4.7–6.1)
SODIUM BLD-SCNC: 140 MEQ/L (ref 135–144)
SPECIFIC GRAVITY UA: 1.03 (ref 1–1.03)
TOTAL PROTEIN: 6.7 G/DL (ref 6.3–8)
URINE REFLEX TO CULTURE: NORMAL
UROBILINOGEN, URINE: 0.2 E.U./DL
WBC # BLD: 10.4 K/UL (ref 4.8–10.8)

## 2019-03-21 PROCEDURE — 81003 URINALYSIS AUTO W/O SCOPE: CPT

## 2019-03-21 PROCEDURE — 85025 COMPLETE CBC W/AUTO DIFF WBC: CPT

## 2019-03-21 PROCEDURE — 36415 COLL VENOUS BLD VENIPUNCTURE: CPT

## 2019-03-21 PROCEDURE — 96374 THER/PROPH/DIAG INJ IV PUSH: CPT

## 2019-03-21 PROCEDURE — 96372 THER/PROPH/DIAG INJ SC/IM: CPT | Performed by: FAMILY MEDICINE

## 2019-03-21 PROCEDURE — 80053 COMPREHEN METABOLIC PANEL: CPT

## 2019-03-21 PROCEDURE — 96375 TX/PRO/DX INJ NEW DRUG ADDON: CPT

## 2019-03-21 PROCEDURE — 6360000002 HC RX W HCPCS: Performed by: PERSONAL EMERGENCY RESPONSE ATTENDANT

## 2019-03-21 PROCEDURE — 99284 EMERGENCY DEPT VISIT MOD MDM: CPT

## 2019-03-21 PROCEDURE — 74150 CT ABDOMEN W/O CONTRAST: CPT

## 2019-03-21 RX ORDER — TAMSULOSIN HYDROCHLORIDE 0.4 MG/1
0.4 CAPSULE ORAL DAILY
Qty: 5 CAPSULE | Refills: 0 | Status: SHIPPED | OUTPATIENT
Start: 2019-03-21 | End: 2019-07-09

## 2019-03-21 RX ORDER — TESTOSTERONE CYPIONATE 200 MG/ML
200 INJECTION INTRAMUSCULAR ONCE
Status: COMPLETED | OUTPATIENT
Start: 2019-03-21 | End: 2019-03-21

## 2019-03-21 RX ORDER — ONDANSETRON 4 MG/1
4 TABLET, ORALLY DISINTEGRATING ORAL EVERY 8 HOURS PRN
Qty: 20 TABLET | Refills: 0 | Status: SHIPPED | OUTPATIENT
Start: 2019-03-21 | End: 2019-07-09 | Stop reason: ALTCHOICE

## 2019-03-21 RX ORDER — ONDANSETRON 2 MG/ML
4 INJECTION INTRAMUSCULAR; INTRAVENOUS ONCE
Status: COMPLETED | OUTPATIENT
Start: 2019-03-21 | End: 2019-03-21

## 2019-03-21 RX ORDER — KETOROLAC TROMETHAMINE 30 MG/ML
30 INJECTION, SOLUTION INTRAMUSCULAR; INTRAVENOUS ONCE
Status: COMPLETED | OUTPATIENT
Start: 2019-03-21 | End: 2019-03-21

## 2019-03-21 RX ORDER — HYDROCODONE BITARTRATE AND ACETAMINOPHEN 5; 325 MG/1; MG/1
1-2 TABLET ORAL EVERY 6 HOURS PRN
Qty: 10 TABLET | Refills: 0 | Status: SHIPPED | OUTPATIENT
Start: 2019-03-21 | End: 2019-03-28

## 2019-03-21 RX ADMIN — TESTOSTERONE CYPIONATE 200 MG: 200 INJECTION INTRAMUSCULAR at 17:18

## 2019-03-21 RX ADMIN — KETOROLAC TROMETHAMINE 30 MG: 30 INJECTION, SOLUTION INTRAMUSCULAR; INTRAVENOUS at 00:24

## 2019-03-21 RX ADMIN — HYDROMORPHONE HYDROCHLORIDE 1 MG: 1 INJECTION, SOLUTION INTRAMUSCULAR; INTRAVENOUS; SUBCUTANEOUS at 00:24

## 2019-03-21 RX ADMIN — ONDANSETRON 4 MG: 2 INJECTION INTRAMUSCULAR; INTRAVENOUS at 00:24

## 2019-03-21 ASSESSMENT — PAIN DESCRIPTION - LOCATION: LOCATION: FLANK

## 2019-03-21 ASSESSMENT — ENCOUNTER SYMPTOMS
DIARRHEA: 0
COUGH: 0
VOMITING: 0
BLOOD IN STOOL: 0
COLOR CHANGE: 0
NAUSEA: 1
RHINORRHEA: 0
SHORTNESS OF BREATH: 0
SORE THROAT: 0
ABDOMINAL PAIN: 1

## 2019-03-21 ASSESSMENT — PAIN SCALES - GENERAL
PAINLEVEL_OUTOF10: 2
PAINLEVEL_OUTOF10: 9

## 2019-03-21 ASSESSMENT — PAIN DESCRIPTION - PROGRESSION: CLINICAL_PROGRESSION: RAPIDLY IMPROVING

## 2019-03-21 ASSESSMENT — PAIN DESCRIPTION - ORIENTATION: ORIENTATION: RIGHT

## 2019-03-21 ASSESSMENT — PAIN DESCRIPTION - PAIN TYPE: TYPE: ACUTE PAIN

## 2019-03-21 ASSESSMENT — PAIN DESCRIPTION - FREQUENCY: FREQUENCY: INTERMITTENT

## 2019-04-18 ENCOUNTER — NURSE ONLY (OUTPATIENT)
Dept: FAMILY MEDICINE CLINIC | Age: 49
End: 2019-04-18
Payer: COMMERCIAL

## 2019-04-18 DIAGNOSIS — E29.1 HYPOGONADISM IN MALE: Primary | ICD-10-CM

## 2019-04-18 PROCEDURE — 96372 THER/PROPH/DIAG INJ SC/IM: CPT | Performed by: FAMILY MEDICINE

## 2019-04-18 RX ORDER — TESTOSTERONE CYPIONATE 200 MG/ML
200 INJECTION INTRAMUSCULAR ONCE
Status: COMPLETED | OUTPATIENT
Start: 2019-04-18 | End: 2019-04-18

## 2019-04-18 RX ADMIN — TESTOSTERONE CYPIONATE 200 MG: 200 INJECTION INTRAMUSCULAR at 17:50

## 2019-07-09 ENCOUNTER — OFFICE VISIT (OUTPATIENT)
Dept: FAMILY MEDICINE CLINIC | Age: 49
End: 2019-07-09
Payer: COMMERCIAL

## 2019-07-09 VITALS
HEIGHT: 72 IN | SYSTOLIC BLOOD PRESSURE: 120 MMHG | DIASTOLIC BLOOD PRESSURE: 78 MMHG | WEIGHT: 264.8 LBS | HEART RATE: 90 BPM | OXYGEN SATURATION: 95 % | BODY MASS INDEX: 35.87 KG/M2

## 2019-07-09 DIAGNOSIS — M70.61 TROCHANTERIC BURSITIS OF BOTH HIPS: Primary | ICD-10-CM

## 2019-07-09 DIAGNOSIS — M70.62 TROCHANTERIC BURSITIS OF BOTH HIPS: Primary | ICD-10-CM

## 2019-07-09 PROCEDURE — 99213 OFFICE O/P EST LOW 20 MIN: CPT | Performed by: FAMILY MEDICINE

## 2019-07-09 RX ORDER — METHYLPREDNISOLONE 4 MG/1
TABLET ORAL
Qty: 1 KIT | Refills: 0 | Status: SHIPPED | OUTPATIENT
Start: 2019-07-09 | End: 2019-08-20

## 2019-07-09 RX ORDER — METOPROLOL SUCCINATE 25 MG/1
TABLET, EXTENDED RELEASE ORAL
Refills: 0 | COMMUNITY
Start: 2019-06-30 | End: 2019-07-09

## 2019-07-09 ASSESSMENT — PATIENT HEALTH QUESTIONNAIRE - PHQ9
2. FEELING DOWN, DEPRESSED OR HOPELESS: 0
SUM OF ALL RESPONSES TO PHQ QUESTIONS 1-9: 0
SUM OF ALL RESPONSES TO PHQ9 QUESTIONS 1 & 2: 0
1. LITTLE INTEREST OR PLEASURE IN DOING THINGS: 0
SUM OF ALL RESPONSES TO PHQ QUESTIONS 1-9: 0

## 2019-07-09 NOTE — PATIENT INSTRUCTIONS
floor.  2. Put the ankle of your affected leg on your opposite thigh near your knee. 3. Use your hand to gently push your knee away from your body until you feel a gentle stretch around your hip. 4. Hold the stretch for 15 to 30 seconds. 5. Repeat 2 to 4 times. 6. Repeat steps 1 through 5, but this time use your hand to gently pull your knee toward your opposite shoulder. Iliotibial band stretch    1. Lean sideways against a wall. If you are not steady on your feet, hold on to a chair or counter. 2. Stand on the leg with the affected hip, with that leg close to the wall. Then cross your other leg in front of it. 3. Let your affected hip drop out to the side of your body and against wall. Then lean away from your affected hip until you feel a stretch. 4. Hold the stretch for 15 to 30 seconds. 5. Repeat 2 to 4 times. Straight-leg raises to the outside    1. Lie on your side, with your affected hip on top. 2. Tighten the front thigh muscles of your top leg to keep your knee straight. 3. Keep your hip and your leg straight in line with the rest of your body, and keep your knee pointing forward. Do not drop your hip back. 4. Lift your top leg straight up toward the ceiling, about 12 inches off the floor. Hold for about 6 seconds, then slowly lower your leg. 5. Repeat 8 to 12 times. Clamshell    1. Lie on your side, with your affected hip on top and your head propped on a pillow. Keep your feet and knees together and your knees bent. 2. Raise your top knee, but keep your feet together. Do not let your hips roll back. Your legs should open up like a clamshell. 3. Hold for 6 seconds. 4. Slowly lower your knee back down. Rest for 10 seconds. 5. Repeat 8 to 12 times. Follow-up care is a key part of your treatment and safety. Be sure to make and go to all appointments, and call your doctor if you are having problems.  It's also a good idea to know your test results and keep a list of the medicines

## 2019-08-20 ENCOUNTER — OFFICE VISIT (OUTPATIENT)
Dept: FAMILY MEDICINE CLINIC | Age: 49
End: 2019-08-20
Payer: COMMERCIAL

## 2019-08-20 VITALS
WEIGHT: 258.6 LBS | HEIGHT: 72 IN | SYSTOLIC BLOOD PRESSURE: 120 MMHG | OXYGEN SATURATION: 95 % | HEART RATE: 86 BPM | DIASTOLIC BLOOD PRESSURE: 74 MMHG | BODY MASS INDEX: 35.03 KG/M2

## 2019-08-20 DIAGNOSIS — M70.62 TROCHANTERIC BURSITIS OF BOTH HIPS: Primary | ICD-10-CM

## 2019-08-20 DIAGNOSIS — M70.61 TROCHANTERIC BURSITIS OF BOTH HIPS: Primary | ICD-10-CM

## 2019-08-20 PROCEDURE — 99213 OFFICE O/P EST LOW 20 MIN: CPT | Performed by: FAMILY MEDICINE

## 2019-08-20 RX ORDER — PREDNISONE 20 MG/1
TABLET ORAL
Qty: 20 TABLET | Refills: 0 | Status: SHIPPED | OUTPATIENT
Start: 2019-08-20 | End: 2019-08-30

## 2019-08-20 NOTE — PROGRESS NOTES
None     Inability: None    Transportation needs:     Medical: None     Non-medical: None   Tobacco Use    Smoking status: Current Some Day Smoker     Packs/day: 0.25     Years: 30.00     Pack years: 7.50     Types: Cigars    Smokeless tobacco: Never Used   Substance and Sexual Activity    Alcohol use: No     Comment: social    Drug use: No    Sexual activity: None   Lifestyle    Physical activity:     Days per week: None     Minutes per session: None    Stress: None   Relationships    Social connections:     Talks on phone: None     Gets together: None     Attends Quaker service: None     Active member of club or organization: None     Attends meetings of clubs or organizations: None     Relationship status: None    Intimate partner violence:     Fear of current or ex partner: None     Emotionally abused: None     Physically abused: None     Forced sexual activity: None   Other Topics Concern    None   Social History Narrative    None     No Known Allergies    Review of Systems:   General ROS: fatigue  Respiratory ROS: no cough, shortness of breath, or wheezing  Cardiovascular ROS: no chest pain or dyspnea on exertion  Gastrointestinal ROS: no abdominal pain, change in bowel habits, or black or bloody stools  Genito-Urinary ROS: no dysuria, trouble voiding  Musculoskeletal ROS: per HPI  Neurological ROS: tingling in legs bilaterally  In general patient otherwise reports feeling well.      Physical Exam:  /74 (Site: Left Upper Arm)   Pulse 86   Ht 6' (1.829 m)   Wt 258 lb 9.6 oz (117.3 kg)   SpO2 95%   BMI 35.07 kg/m²     Gen: Well, NAD, Alert, Oriented x 3   HEENT: EOMI, eyes clear, MMM  Skin: multiple skin tags on neck  Bilateral hips are tender    Lab Results   Component Value Date    WBC 10.4 03/21/2019    HGB 16.6 03/21/2019    HCT 48.3 03/21/2019     03/21/2019    CHOL 176 02/14/2019    TRIG 132 02/14/2019    HDL 35 (L) 02/14/2019    ALT 23 03/21/2019    AST 19 03/21/2019    NA

## 2019-09-23 DIAGNOSIS — R79.89 LOW TESTOSTERONE IN MALE: ICD-10-CM

## 2019-09-25 RX ORDER — METFORMIN HYDROCHLORIDE 500 MG/1
TABLET, EXTENDED RELEASE ORAL
Qty: 60 TABLET | Refills: 3 | Status: SHIPPED | OUTPATIENT
Start: 2019-09-25 | End: 2020-05-22 | Stop reason: ALTCHOICE

## 2019-10-10 ENCOUNTER — OFFICE VISIT (OUTPATIENT)
Dept: FAMILY MEDICINE CLINIC | Age: 49
End: 2019-10-10
Payer: COMMERCIAL

## 2019-10-10 VITALS
DIASTOLIC BLOOD PRESSURE: 80 MMHG | OXYGEN SATURATION: 94 % | WEIGHT: 253.4 LBS | SYSTOLIC BLOOD PRESSURE: 130 MMHG | HEART RATE: 86 BPM | BODY MASS INDEX: 34.32 KG/M2 | HEIGHT: 72 IN

## 2019-10-10 DIAGNOSIS — R79.89 LOW TESTOSTERONE IN MALE: Primary | ICD-10-CM

## 2019-10-10 DIAGNOSIS — Z23 NEEDS FLU SHOT: ICD-10-CM

## 2019-10-10 DIAGNOSIS — G47.30 SLEEP APNEA, UNSPECIFIED TYPE: ICD-10-CM

## 2019-10-10 DIAGNOSIS — E29.1 HYPOGONADISM IN MALE: ICD-10-CM

## 2019-10-10 DIAGNOSIS — E11.9 TYPE 2 DIABETES MELLITUS WITHOUT COMPLICATION, WITHOUT LONG-TERM CURRENT USE OF INSULIN (HCC): ICD-10-CM

## 2019-10-10 DIAGNOSIS — G47.33 OBSTRUCTIVE SLEEP APNEA SYNDROME: ICD-10-CM

## 2019-10-10 PROCEDURE — 90471 IMMUNIZATION ADMIN: CPT | Performed by: FAMILY MEDICINE

## 2019-10-10 PROCEDURE — 99213 OFFICE O/P EST LOW 20 MIN: CPT | Performed by: FAMILY MEDICINE

## 2019-10-10 PROCEDURE — 90688 IIV4 VACCINE SPLT 0.5 ML IM: CPT | Performed by: FAMILY MEDICINE

## 2019-10-10 RX ORDER — TESTOSTERONE 16.2 MG/G
2 GEL TRANSDERMAL DAILY
Qty: 75 G | Refills: 5 | Status: SHIPPED | OUTPATIENT
Start: 2019-10-10 | End: 2020-05-22

## 2019-10-10 RX ORDER — GLUCOSAMINE HCL/CHONDROITIN SU 500-400 MG
1 CAPSULE ORAL DAILY
Qty: 100 STRIP | Refills: 3 | Status: SHIPPED | OUTPATIENT
Start: 2019-10-10 | End: 2020-05-22

## 2019-10-10 RX ORDER — BLOOD-GLUCOSE METER
1 KIT MISCELLANEOUS DAILY PRN
Qty: 1 KIT | Refills: 0 | Status: SHIPPED | OUTPATIENT
Start: 2019-10-10 | End: 2020-05-22

## 2019-10-17 DIAGNOSIS — R79.89 LOW TESTOSTERONE IN MALE: ICD-10-CM

## 2019-10-17 DIAGNOSIS — E11.9 TYPE 2 DIABETES MELLITUS WITHOUT COMPLICATION, WITHOUT LONG-TERM CURRENT USE OF INSULIN (HCC): ICD-10-CM

## 2019-10-17 LAB
CHOLESTEROL, TOTAL: 173 MG/DL (ref 0–199)
CREATININE URINE: 351.6 MG/DL
HBA1C MFR BLD: 6.6 % (ref 4.8–5.9)
HDLC SERPL-MCNC: 37 MG/DL (ref 40–59)
LDL CHOLESTEROL CALCULATED: 110 MG/DL (ref 0–129)
MICROALBUMIN UR-MCNC: 2.3 MG/DL
MICROALBUMIN/CREAT UR-RTO: 6.5 MG/G (ref 0–30)
TRIGL SERPL-MCNC: 132 MG/DL (ref 0–150)

## 2019-10-19 LAB — TESTOSTERONE TOTAL-MALE: 246 NG/DL (ref 300–890)

## 2020-02-02 ENCOUNTER — HOSPITAL ENCOUNTER (OUTPATIENT)
Dept: MRI IMAGING | Age: 50
Discharge: HOME | End: 2020-02-04
Payer: COMMERCIAL

## 2020-02-02 PROCEDURE — 72148 MRI LUMBAR SPINE W/O DYE: CPT

## 2020-05-20 ENCOUNTER — APPOINTMENT (OUTPATIENT)
Dept: ULTRASOUND IMAGING | Age: 50
End: 2020-05-20
Payer: COMMERCIAL

## 2020-05-20 ENCOUNTER — NURSE TRIAGE (OUTPATIENT)
Dept: OTHER | Facility: CLINIC | Age: 50
End: 2020-05-20

## 2020-05-20 ENCOUNTER — APPOINTMENT (OUTPATIENT)
Dept: GENERAL RADIOLOGY | Age: 50
End: 2020-05-20
Payer: COMMERCIAL

## 2020-05-20 ENCOUNTER — HOSPITAL ENCOUNTER (EMERGENCY)
Age: 50
Discharge: HOME | End: 2020-05-20
Attending: STUDENT IN AN ORGANIZED HEALTH CARE EDUCATION/TRAINING PROGRAM
Payer: COMMERCIAL

## 2020-05-20 VITALS
DIASTOLIC BLOOD PRESSURE: 74 MMHG | HEART RATE: 76 BPM | HEIGHT: 72 IN | BODY MASS INDEX: 35.49 KG/M2 | WEIGHT: 262 LBS | OXYGEN SATURATION: 99 % | SYSTOLIC BLOOD PRESSURE: 149 MMHG | RESPIRATION RATE: 18 BRPM | TEMPERATURE: 98.4 F

## 2020-05-20 LAB
ALBUMIN SERPL-MCNC: 4 G/DL (ref 3.5–4.6)
ALP BLD-CCNC: 66 U/L (ref 35–104)
ALT SERPL-CCNC: 25 U/L (ref 0–41)
ANION GAP SERPL CALCULATED.3IONS-SCNC: 10 MEQ/L (ref 9–15)
APTT: 27.1 SEC (ref 24.4–36.8)
AST SERPL-CCNC: 26 U/L (ref 0–40)
ATYPICAL LYMPHOCYTE RELATIVE PERCENT: 7 %
BASOPHILS ABSOLUTE: 0.1 K/UL (ref 0–0.2)
BASOPHILS RELATIVE PERCENT: 1 %
BILIRUB SERPL-MCNC: 0.4 MG/DL (ref 0.2–0.7)
BUN BLDV-MCNC: 19 MG/DL (ref 6–20)
CALCIUM SERPL-MCNC: 9.5 MG/DL (ref 8.5–9.9)
CHLORIDE BLD-SCNC: 104 MEQ/L (ref 95–107)
CO2: 27 MEQ/L (ref 20–31)
CREAT SERPL-MCNC: 0.8 MG/DL (ref 0.7–1.2)
EOSINOPHILS ABSOLUTE: 0.1 K/UL (ref 0–0.7)
EOSINOPHILS RELATIVE PERCENT: 1 %
GFR AFRICAN AMERICAN: >60
GFR NON-AFRICAN AMERICAN: >60
GLOBULIN: 2.6 G/DL (ref 2.3–3.5)
GLUCOSE BLD-MCNC: 140 MG/DL (ref 70–99)
HCT VFR BLD CALC: 44.6 % (ref 42–52)
HEMOGLOBIN: 15.4 G/DL (ref 14–18)
INR BLD: 1
LYMPHOCYTES ABSOLUTE: 3.9 K/UL (ref 1–4.8)
LYMPHOCYTES RELATIVE PERCENT: 35 %
MCH RBC QN AUTO: 33.5 PG (ref 27–31.3)
MCHC RBC AUTO-ENTMCNC: 34.6 % (ref 33–37)
MCV RBC AUTO: 96.7 FL (ref 80–100)
MONOCYTES ABSOLUTE: 0.5 K/UL (ref 0.2–0.8)
MONOCYTES RELATIVE PERCENT: 4.7 %
NEUTROPHILS ABSOLUTE: 4.8 K/UL (ref 1.4–6.5)
NEUTROPHILS RELATIVE PERCENT: 52 %
PDW BLD-RTO: 13.2 % (ref 11.5–14.5)
PLATELET # BLD: 149 K/UL (ref 130–400)
PLATELET SLIDE REVIEW: NORMAL
POTASSIUM SERPL-SCNC: 3.8 MEQ/L (ref 3.4–4.9)
PROTHROMBIN TIME: 13.1 SEC (ref 12.3–14.9)
RBC # BLD: 4.61 M/UL (ref 4.7–6.1)
RBC # BLD: NORMAL 10*6/UL
SODIUM BLD-SCNC: 141 MEQ/L (ref 135–144)
TOTAL PROTEIN: 6.6 G/DL (ref 6.3–8)
WBC # BLD: 9.2 K/UL (ref 4.8–10.8)

## 2020-05-20 PROCEDURE — 93971 EXTREMITY STUDY: CPT

## 2020-05-20 PROCEDURE — 73590 X-RAY EXAM OF LOWER LEG: CPT

## 2020-05-20 PROCEDURE — 87040 BLOOD CULTURE FOR BACTERIA: CPT

## 2020-05-20 PROCEDURE — 85610 PROTHROMBIN TIME: CPT

## 2020-05-20 PROCEDURE — 36415 COLL VENOUS BLD VENIPUNCTURE: CPT

## 2020-05-20 PROCEDURE — 6360000002 HC RX W HCPCS: Performed by: PHYSICIAN ASSISTANT

## 2020-05-20 PROCEDURE — 96372 THER/PROPH/DIAG INJ SC/IM: CPT

## 2020-05-20 PROCEDURE — 99284 EMERGENCY DEPT VISIT MOD MDM: CPT

## 2020-05-20 PROCEDURE — 80053 COMPREHEN METABOLIC PANEL: CPT

## 2020-05-20 PROCEDURE — 85025 COMPLETE CBC W/AUTO DIFF WBC: CPT

## 2020-05-20 PROCEDURE — 85730 THROMBOPLASTIN TIME PARTIAL: CPT

## 2020-05-20 RX ORDER — NABUMETONE 750 MG/1
750 TABLET, FILM COATED ORAL DAILY
COMMUNITY
End: 2020-07-07 | Stop reason: ALTCHOICE

## 2020-05-20 RX ORDER — KETOROLAC TROMETHAMINE 15 MG/ML
15 INJECTION, SOLUTION INTRAMUSCULAR; INTRAVENOUS ONCE
Status: DISCONTINUED | OUTPATIENT
Start: 2020-05-20 | End: 2020-05-20 | Stop reason: HOSPADM

## 2020-05-20 RX ADMIN — ENOXAPARIN SODIUM 120 MG: 120 INJECTION SUBCUTANEOUS at 12:56

## 2020-05-20 ASSESSMENT — PAIN DESCRIPTION - ORIENTATION: ORIENTATION: RIGHT

## 2020-05-20 ASSESSMENT — PAIN SCALES - GENERAL: PAINLEVEL_OUTOF10: 5

## 2020-05-20 ASSESSMENT — PAIN DESCRIPTION - PROGRESSION: CLINICAL_PROGRESSION: GRADUALLY WORSENING

## 2020-05-20 ASSESSMENT — PAIN DESCRIPTION - LOCATION: LOCATION: LEG

## 2020-05-20 ASSESSMENT — PAIN DESCRIPTION - DESCRIPTORS: DESCRIPTORS: ACHING

## 2020-05-20 ASSESSMENT — PAIN DESCRIPTION - FREQUENCY: FREQUENCY: CONTINUOUS

## 2020-05-20 ASSESSMENT — PAIN DESCRIPTION - PAIN TYPE: TYPE: ACUTE PAIN

## 2020-05-20 NOTE — ED NOTES
Discharge instructions reviewed and signed. Prescription given to pt. Side effects of blood thinners discussed in depth with pt. No questions at this time. Pt ambulatory in stable condition at discharge.      Sierra Campbell RN  05/20/20 0898

## 2020-05-20 NOTE — ED PROVIDER NOTES
problem, neck pain and neck stiffness. Skin: Negative for color change, pallor, rash and wound. Neurological: Negative for dizziness, tremors, syncope, weakness, numbness and headaches. Psychiatric/Behavioral: Negative for agitation and confusion. Except as noted above the remainder of the review of systems was reviewed and negative. PAST MEDICAL HISTORY     Past Medical History:   Diagnosis Date    Diabetes mellitus ([facility] [address]     Kidney stone     Pneumonia     Sleep apnea     Syncope and collapse 5/1/2018    Vasovagal syncope 5/1/2018         SURGICALHISTORY     History reviewed. No pertinent surgical history. CURRENT MEDICATIONS       Discharge Medication List as of 5/20/2020 12:38 PM      CONTINUE these medications which have NOT CHANGED    Details   nabumetone (RELAFEN) 750 MG tablet Take 750 mg by mouth dailyHistorical Med      Testosterone (ANDROGEL PUMP) 20.25 MG/ACT (1.62%) GEL gel Place 2 actuation onto the skin daily for 183 days. , Disp-75 g, R-5Normal      Blood Glucose Monitoring Suppl (ONE TOUCH ULTRA MINI) w/Device KIT DAILY PRN Starting Thu 10/10/2019, Disp-1 kit, R-0, Print      ONE TOUCH LANCETS MISC DAILY Starting Thu 10/10/2019, Disp-100 each, R-3, Print      blood glucose monitor strips 1 strip by Other route daily Test 1 times a day & as needed for symptoms of irregular blood glucose., Other, DAILY Starting Thu 10/10/2019, Disp-100 strip, R-3, Print      metFORMIN (GLUCOPHAGE-XR) 500 MG extended release tablet take 1 tablet by mouth once daily WITH BREAKFAST, Disp-60 tablet, R-3Normal      CPAP Machine MISC Starting Fri 8/31/2018, Disp-1 each, R-0, Print             ALLERGIES     Patient has no known allergies.     FAMILY HISTORY       Family History   Problem Relation Age of Onset    Other Mother         COPD    Diabetes Father           SOCIAL HISTORY       Social History     Socioeconomic History    Marital status:      Spouse name: None    Number of children: None    Years of education: None    Highest education level: None   Occupational History    None   Social Needs    Financial resource strain: None    Food insecurity     Worry: None     Inability: None    Transportation needs     Medical: None     Non-medical: None   Tobacco Use    Smoking status: Current Some Day Smoker     Packs/day: 0.25     Years: 30.00     Pack years: 7.50     Types: Cigars    Smokeless tobacco: Never Used   Substance and Sexual Activity    Alcohol use: No     Comment: social    Drug use: No    Sexual activity: None   Lifestyle    Physical activity     Days per week: None     Minutes per session: None    Stress: None   Relationships    Social connections     Talks on phone: None     Gets together: None     Attends Christian service: None     Active member of club or organization: None     Attends meetings of clubs or organizations: None     Relationship status: None    Intimate partner violence     Fear of current or ex partner: None     Emotionally abused: None     Physically abused: None     Forced sexual activity: None   Other Topics Concern    None   Social History Narrative    None       SCREENINGS      @FLOW([id number])@      PHYSICAL EXAM    (up to 7 for level 4, 8 or more for level 5)     ED Triage Vitals   BP Temp Temp Source Pulse Resp SpO2 Height Weight   05/20/20 1123 05/20/20 1123 05/20/20 1123 05/20/20 1123 05/20/20 1123 05/20/20 1123 05/20/20 1117 05/20/20 1117   (!) 149/74 98.4 °F (36.9 °C) Oral 76 18 99 % 6' (1.829 m) 262 lb (118.8 kg)       Physical Exam  Vitals signs and nursing note reviewed. Constitutional:       General: He is not in acute distress. Appearance: Normal appearance. He is well-developed. He is not ill-appearing, toxic-appearing or diaphoretic. HENT:      Head: Normocephalic. Right Ear: Tympanic membrane normal.      Left Ear: Tympanic membrane normal.      Nose: Nose normal. No congestion.       Mouth/Throat:      Mouth: (DVT) of proximal vein of right lower extremity [facility]):   Diagnosis management comments: Patient presented to the ED with complaint of pain and swelling to his right lower extremity which she states been ongoing for the last 1 week. He has not followed up with his primary doctor, he denies any acute injury, he denies any recent travel, no chest pain or shortness of breath. He does have a fair amount of edema, erythema noted to right lower extremity, ultrasound was completed which shows occlusive thrombus of the mid right femoral vein through the popliteal vein. X-ray of the right tib-fib was also completed which shows no acute fracture or no signs for subcutaneous air. Labs show no acute findings at this time. Patient was given a shot of Lovenox in the emerge department and given a prescription for Eliquis. He is advised to contact his regular family physician in the next 48 hours for follow-up. He was advised also if he has any worsening or changes symptoms return to the ER. He was given a prescription for Eliquis. CRITICAL CARE TIME   Total Critical Care time was 0 minutes, excluding separately reportableprocedures. There was a high probability of clinicallysignificant/life threatening deterioration in the patient's condition which required my urgent intervention. CONSULTS:  None    PROCEDURES:  Unless otherwise noted below, none     Procedures    FINAL IMPRESSION      1.  Acute deep vein thrombosis (DVT) of proximal vein of right lower extremity [facility])          DISPOSITION/PLAN   DISPOSITION Decision To Discharge 05/20/2020 12:36:39 PM      PATIENT REFERRED TO:  Henrik Ervin MD  [address] [facility]  [phone]    In 2 days      Jasmin Mcdonald MD  [address]  [phone]    In 2 days        DISCHARGE MEDICATIONS:  Discharge Medication List as of 5/20/2020 12:38 PM      START taking these medications    Details   apixaban (ELIQUIS DVT/PE STARTER PACK) 5 MG TABS tablet Take 10 mg (2 tablets) orally twice daily for 7 days, then take 5 mg (1 tablet) orally twice daily thereafter., Disp-74 tablet, R-0Print                (Please note that portions of this note were completed with a voice recognition program.  Efforts were made to edit the dictations but occasionally words are mis-transcribed.)    Miguel Angel Ayala PA-C (electronically signed)  Attending Emergency Physician         Miguel Angel Ayala PA-C  05/20/20 [address] [place]perla Spann PA-C  05/21/20 3484

## 2020-05-20 NOTE — ED TRIAGE NOTES
Pt is a+o x4 msps intact lungs clear bilat. Skin warm pink and dry. Pt appears in no distress at this time. C/c right lower leg swelling and pain. Pt states this started about 1 week prior and states it is gradually increasing in pain. Pt appears in no distress at this time. Right lower leg is red and warm to touch up to right knee at this time. Pt denies any sob at this time. Pt denies any long trips at this time.  Pt denies any fevers or chills

## 2020-05-20 NOTE — TELEPHONE ENCOUNTER
Reason for Disposition   Thigh, calf, or ankle swelling in only one leg    Answer Assessment - Initial Assessment Questions  1. ONSET: \"When did the swelling start? \" (e.g., minutes, hours, days)      1 week ago  2. LOCATION: \"What part of the leg is swollen? \"  \"Are both legs swollen or just one leg? \"      Calf and up to thigh  3. SEVERITY: \"How bad is the swelling? \" (e.g., localized; mild, moderate, severe)   - Localized - small area of swelling localized to one leg   - MILD pedal edema - swelling limited to foot and ankle, pitting edema < 1/4 inch (6 mm) deep, rest and elevation eliminate most or all swelling   - MODERATE edema - swelling of lower leg to knee, pitting edema > 1/4 inch (6 mm) deep, rest and elevation only partially reduce swelling   - SEVERE edema - swelling extends above knee, facial or hand swelling present      moderate  4. REDNESS: \"Does the swelling look red or infected? \"      Red, purple, warm to touch  5. PAIN: \"Is the swelling painful to touch? \" If so, ask: \"How painful is it? \"   (Scale 1-10; mild, moderate or severe)      5/10  6. FEVER: \"Do you have a fever? \" If so, ask: \"What is it, how was it measured, and when did it start? \"       no  7. CAUSE: \"What do you think is causing the leg swelling? \"      unsure  8. MEDICAL HISTORY: \"Do you have a history of heart failure, kidney disease, liver failure, or cancer? \"      no  9. RECURRENT SYMPTOM: \"Have you had leg swelling before? \" If so, ask: \"When was the last time? \" \"What happened that time? \"      no  10. OTHER SYMPTOMS: \"Do you have any other symptoms? \" (e.g., chest pain, difficulty breathing)        Back pain, hip pain  11. PREGNANCY: \"Is there any chance you are pregnant? \" \"When was your last menstrual period? \"        n/a    Protocols used: LEG SWELLING AND EDEMA-ADULT-OH

## 2020-05-21 ENCOUNTER — VIRTUAL VISIT (OUTPATIENT)
Dept: INTERVENTIONAL RADIOLOGY/VASCULAR | Age: 50
End: 2020-05-21
Payer: COMMERCIAL

## 2020-05-21 PROCEDURE — 99443 PR PHYS/QHP TELEPHONE EVALUATION 21-30 MIN: CPT | Performed by: NURSE PRACTITIONER

## 2020-05-21 ASSESSMENT — ENCOUNTER SYMPTOMS
COLOR CHANGE: 0
BACK PAIN: 1
SHORTNESS OF BREATH: 0
VOMITING: 0
EYE DISCHARGE: 0
NAUSEA: 0
ABDOMINAL PAIN: 0
ABDOMINAL DISTENTION: 0
COUGH: 0
NAUSEA: 0
EYE REDNESS: 0
EYES NEGATIVE: 1
DIARRHEA: 0
SINUS PAIN: 0
VOMITING: 0
TROUBLE SWALLOWING: 0
RHINORRHEA: 0
CONSTIPATION: 0
SHORTNESS OF BREATH: 0
ABDOMINAL PAIN: 0
EYE PAIN: 0
WHEEZING: 0
SORE THROAT: 0
EYE ITCHING: 0
BACK PAIN: 0
SORE THROAT: 0
ABDOMINAL DISTENTION: 0
CONSTIPATION: 0
DIARRHEA: 0
RESPIRATORY NEGATIVE: 1
EYE DISCHARGE: 0

## 2020-05-21 NOTE — PROGRESS NOTES
2020    TELEHEALTH EVALUATION -- Audio/Visual (During AOITM-41 public health emergency)    Due to Mikel 19 outbreak, patient's office visit was converted to a virtual visit. Patient was contacted and agreed to proceed with a virtual visit via Telephone Visit. Unable to connect via VV. The risks and benefits of converting to a virtual visit were discussed in light of the current infectious disease epidemic. Patient also understood that insurance coverage and co-pays are up to their individual insurance plans. HPI:    Benny Gabriel II (:  1970) has requested an audio/video evaluation for the following concern(s):    Referred by [facility] ED for evaluation of RLE acute DVT. Appears unprovoked. US yesterday in ED reports occlusive thrombus mid right femoral vein extending through popliteal vein. Superficial thrombus also noted in proximal Rt. SSV. Patent right CFV and proximal right FV. Right leg swelling mid thigh to feet onset x 1.5 weeks ago. Denies injury, long traveling. States no LE adverse symptoms prior to this. Pain to right lower leg he describes as muscle cramp he rates as 4/10. He states yesterday RLE was very red and today he states redness is minimal. He was initiated on Eliquis in ED yesterday along with being given a SQ Lovenox injection. Today he states redness and swelling have gone down some since yesterday but pain remains. Denies any H/O DVT or PE's. Denies any LE varicosities. Does not wear compression stockings. States he attempted to wear OTC compression socks a few years ago but quit wearing them as they cut into his leg causing pain. States he has left calf pain as well but this has been going on for several years. Denies left leg swelling. Denies bilateral LE fatigue or heaviness. Family PCP Dr. Josiane Marin follow up tomorrow for Neolane. Review of Systems   Constitutional: Negative.   Negative for activity change, appetite change, chills, fatigue and to authenticate this note. --Carla Blunt, APRN - CNP on 5/21/2020 at [place] [address] were provided through a telephone discussion virtually to substitute for in-person clinic visit. This billable evisit was conducted via Telehealth over phone from [address] [place] [address], [address] [place] [address], [address], via myself and the patient. It was explained to patient purpose of Telehealth visit to limit face to face contact due to Coronavirus mandates now in place. Patient verbalized agreement to participate in a billable Telehealth phone visit. Time spent with telehealth visit planning, patient education and counseling, and preparation > 30 minutes. Pursuant to the emergency declaration under the [facility], [facility] waiver authority and the [employer] Act, this Virtual Visit was conducted, with patient's consent, to reduce the patient's risk of exposure to COVID-19 and provide continuity of care for an established patient. Visit completed using telephone. Patient was located at home and I was located in the clinic.

## 2020-05-22 ENCOUNTER — TELEPHONE (OUTPATIENT)
Dept: INTERVENTIONAL RADIOLOGY/VASCULAR | Age: 50
End: 2020-05-22

## 2020-05-22 ENCOUNTER — OFFICE VISIT (OUTPATIENT)
Dept: FAMILY MEDICINE CLINIC | Age: 50
End: 2020-05-22
Payer: COMMERCIAL

## 2020-05-22 ENCOUNTER — PREP FOR PROCEDURE (OUTPATIENT)
Dept: INTERVENTIONAL RADIOLOGY/VASCULAR | Age: 50
End: 2020-05-22

## 2020-05-22 VITALS
DIASTOLIC BLOOD PRESSURE: 70 MMHG | SYSTOLIC BLOOD PRESSURE: 134 MMHG | OXYGEN SATURATION: 98 % | HEART RATE: 90 BPM | TEMPERATURE: 98.8 F | BODY MASS INDEX: 35.41 KG/M2 | HEIGHT: 72 IN | WEIGHT: 261.4 LBS

## 2020-05-22 PROBLEM — I82.411 ACUTE DEEP VEIN THROMBOSIS (DVT) OF RIGHT FEMORAL VEIN (HCC): Status: ACTIVE | Noted: 2020-05-22

## 2020-05-22 PROBLEM — I82.431 ACUTE DEEP VEIN THROMBOSIS (DVT) OF RIGHT POPLITEAL VEIN (HCC): Status: ACTIVE | Noted: 2020-05-22

## 2020-05-22 PROBLEM — I80.01 SUPERFICIAL THROMBOPHLEBITIS OF RIGHT LEG: Status: ACTIVE | Noted: 2020-05-22

## 2020-05-22 PROCEDURE — 99213 OFFICE O/P EST LOW 20 MIN: CPT | Performed by: NURSE PRACTITIONER

## 2020-05-22 RX ORDER — 0.9 % SODIUM CHLORIDE 0.9 %
250 INTRAVENOUS SOLUTION INTRAVENOUS
Status: CANCELLED | OUTPATIENT
Start: 2020-05-22

## 2020-05-22 RX ORDER — MIDAZOLAM HYDROCHLORIDE 1 MG/ML
0.5 INJECTION INTRAMUSCULAR; INTRAVENOUS
Status: CANCELLED | OUTPATIENT
Start: 2020-05-22

## 2020-05-22 RX ORDER — LIDOCAINE HYDROCHLORIDE 20 MG/ML
10 INJECTION, SOLUTION INFILTRATION; PERINEURAL
Status: CANCELLED | OUTPATIENT
Start: 2020-05-22

## 2020-05-22 RX ORDER — HEPARIN SODIUM 1000 [USP'U]/ML
6000 INJECTION, SOLUTION INTRAVENOUS; SUBCUTANEOUS ONCE
Status: CANCELLED | OUTPATIENT
Start: 2020-05-22 | End: 2020-05-22

## 2020-05-22 RX ORDER — FENTANYL CITRATE 50 UG/ML
50 INJECTION, SOLUTION INTRAMUSCULAR; INTRAVENOUS
Status: CANCELLED | OUTPATIENT
Start: 2020-05-22

## 2020-05-22 RX ORDER — TIZANIDINE 4 MG/1
TABLET ORAL
COMMUNITY
Start: 2020-03-11 | End: 2020-07-07 | Stop reason: ALTCHOICE

## 2020-05-22 RX ORDER — 0.9 % SODIUM CHLORIDE 0.9 %
1000 INTRAVENOUS SOLUTION INTRAVENOUS
Status: CANCELLED | OUTPATIENT
Start: 2020-05-22

## 2020-05-22 ASSESSMENT — ENCOUNTER SYMPTOMS
SHORTNESS OF BREATH: 0
BACK PAIN: 0
CHEST TIGHTNESS: 0
COUGH: 0
PHOTOPHOBIA: 0

## 2020-05-22 NOTE — PROGRESS NOTES
Subjective  Chief Complaint   Patient presents with    Follow-Up from Hospital     pt states in for blood clots, doing well. compression stockings and blood thinners     Health Maintenance     waiting on colonoscopy, injections not discussed        HPI    Pt here for ER follow up. Was having leg pain and swelling for 1.5 weeks so he went to ER where he was diagnosed with DVT. Was given shot of lovenox in the ER and discharged home on eliquis. He had f/u yesterday with IR and is scheduled for thrombectomy with Dr. Aman Shah next week. Doing okay since discharge. Swelling has gone down some, still having some discomfort, but improving. Taking eliquis as prescribed. Was able to get from pharmacy without any issues. Patient Active Problem List    Diagnosis Date Noted    Acute deep vein thrombosis (DVT) of right femoral vein ([address] 05/22/2020    Acute deep vein thrombosis (DVT) of right popliteal vein ([address] 05/22/2020    Superficial thrombophlebitis of right leg 05/22/2020    Diabetes mellitus [facility] [address]     Sleep apnea     Vasovagal syncope 05/01/2018     Past Medical History:   Diagnosis Date    Diabetes mellitus ([address]     Kidney stone     Pneumonia     Sleep apnea     Syncope and collapse 5/1/2018    Vasovagal syncope 5/1/2018     No past surgical history on file.   Family History   Problem Relation Age of Onset    Other Mother         COPD    Diabetes Father      Social History     Socioeconomic History    Marital status:      Spouse name: None    Number of children: None    Years of education: None    Highest education level: None   Occupational History    None   Social Needs    Financial resource strain: None    Food insecurity     Worry: None     Inability: None    Transportation needs     Medical: None     Non-medical: None   Tobacco Use    Smoking status: Current Some Day Smoker     Packs/day: 0.25     Years: 30.00     Pack years: 7.50     Types: Cigars    Smokeless tobacco: Objective  Vitals:    05/22/20 1514   BP: 134/70   Pulse: 90   Temp: 98.8 °F (37.1 °C)   SpO2: 98%   Weight: 261 lb 6.4 oz (118.6 kg)   Height: 6' (1.829 m)     Physical Exam  Constitutional:       General: He is not in acute distress. Appearance: Normal appearance. He is obese. He is not ill-appearing, toxic-appearing or diaphoretic. HENT:      Head: Normocephalic and atraumatic. Right Ear: External ear normal.      Left Ear: External ear normal.   Neck:      Musculoskeletal: Normal range of motion and neck supple. No neck rigidity or muscular tenderness. Cardiovascular:      Rate and Rhythm: Normal rate and regular rhythm. Pulses: Normal pulses. Heart sounds: Normal heart sounds. No murmur. Pulmonary:      Effort: Pulmonary effort is normal. No respiratory distress. Breath sounds: Normal breath sounds. No stridor. No wheezing, rhonchi or rales. Chest:      Chest wall: No tenderness. Musculoskeletal: Normal range of motion. Right lower leg: Edema present. Left lower leg: No edema. Lymphadenopathy:      Cervical: No cervical adenopathy. Skin:     General: Skin is warm and dry. Capillary Refill: Capillary refill takes less than 2 seconds. Coloration: Skin is not jaundiced or pale. Findings: No bruising, erythema, lesion or rash. Neurological:      General: No focal deficit present. Mental Status: He is alert and oriented to person, place, and time. Mental status is at baseline. Cranial Nerves: No cranial nerve deficit. Coordination: Coordination normal.      Gait: Gait normal.   Psychiatric:         Mood and Affect: Mood normal.         Behavior: Behavior normal.         Thought Content: Thought content normal.         Judgment: Judgment normal.       Assessment & Plan     Diagnosis Orders   1. Acute deep vein thrombosis (DVT) of right femoral vein (HCC)     2.  Acute deep vein thrombosis (DVT) of right popliteal vein (HCC)

## 2020-05-25 LAB
BLOOD CULTURE, ROUTINE: NORMAL
CULTURE, BLOOD 2: NORMAL

## 2020-05-26 ENCOUNTER — HOSPITAL ENCOUNTER (OUTPATIENT)
Dept: INTERVENTIONAL RADIOLOGY/VASCULAR | Age: 50
Discharge: HOME | End: 2020-05-28
Payer: COMMERCIAL

## 2020-05-26 ENCOUNTER — HOSPITAL ENCOUNTER (OUTPATIENT)
Dept: CARDIAC CATH/INVASIVE PROCEDURES | Age: 50
Discharge: HOME | End: 2020-05-26
Payer: COMMERCIAL

## 2020-05-26 VITALS
HEIGHT: 72 IN | DIASTOLIC BLOOD PRESSURE: 53 MMHG | HEART RATE: 62 BPM | RESPIRATION RATE: 11 BRPM | BODY MASS INDEX: 35.49 KG/M2 | SYSTOLIC BLOOD PRESSURE: 111 MMHG | OXYGEN SATURATION: 97 % | WEIGHT: 262 LBS

## 2020-05-26 PROCEDURE — 6360000002 HC RX W HCPCS: Performed by: NURSE PRACTITIONER

## 2020-05-26 PROCEDURE — 6360000002 HC RX W HCPCS

## 2020-05-26 PROCEDURE — 75825 VEIN X-RAY TRUNK: CPT | Performed by: RADIOLOGY

## 2020-05-26 PROCEDURE — 75820 VEIN X-RAY ARM/LEG: CPT | Performed by: RADIOLOGY

## 2020-05-26 PROCEDURE — 2709999900 IR VENOGRAM LOWER EXTREMITY RIGHT

## 2020-05-26 PROCEDURE — 76937 US GUIDE VASCULAR ACCESS: CPT | Performed by: RADIOLOGY

## 2020-05-26 PROCEDURE — 99152 MOD SED SAME PHYS/QHP 5/>YRS: CPT | Performed by: RADIOLOGY

## 2020-05-26 PROCEDURE — 6360000004 HC RX CONTRAST MEDICATION: Performed by: RADIOLOGY

## 2020-05-26 PROCEDURE — 85347 COAGULATION TIME ACTIVATED: CPT

## 2020-05-26 PROCEDURE — 2500000003 HC RX 250 WO HCPCS: Performed by: NURSE PRACTITIONER

## 2020-05-26 PROCEDURE — 76499 UNLISTED DX RADIOGRAPHIC PX: CPT | Performed by: RADIOLOGY

## 2020-05-26 PROCEDURE — APPNB45 APP NON BILLABLE 31-45 MINUTES: Performed by: NURSE PRACTITIONER

## 2020-05-26 PROCEDURE — 2500000003 HC RX 250 WO HCPCS

## 2020-05-26 PROCEDURE — 37187 VENOUS MECH THROMBECTOMY: CPT | Performed by: RADIOLOGY

## 2020-05-26 PROCEDURE — 2580000003 HC RX 258

## 2020-05-26 PROCEDURE — 37252 INTRVASC US NONCORONARY 1ST: CPT | Performed by: RADIOLOGY

## 2020-05-26 PROCEDURE — 36010 PLACE CATHETER IN VEIN: CPT | Performed by: RADIOLOGY

## 2020-05-26 PROCEDURE — 37253 INTRVASC US NONCORONARY ADDL: CPT | Performed by: RADIOLOGY

## 2020-05-26 PROCEDURE — 6360000002 HC RX W HCPCS: Performed by: RADIOLOGY

## 2020-05-26 PROCEDURE — 2580000003 HC RX 258: Performed by: NURSE PRACTITIONER

## 2020-05-26 RX ORDER — 0.9 % SODIUM CHLORIDE 0.9 %
250 INTRAVENOUS SOLUTION INTRAVENOUS
Status: DISCONTINUED | OUTPATIENT
Start: 2020-05-26 | End: 2020-05-29 | Stop reason: HOSPADM

## 2020-05-26 RX ORDER — LIDOCAINE HYDROCHLORIDE 20 MG/ML
10 INJECTION, SOLUTION INFILTRATION; PERINEURAL
Status: DISCONTINUED | OUTPATIENT
Start: 2020-05-26 | End: 2020-05-29 | Stop reason: HOSPADM

## 2020-05-26 RX ORDER — IODIXANOL 320 MG/ML
INJECTION, SOLUTION INTRAVASCULAR
Status: COMPLETED | OUTPATIENT
Start: 2020-05-26 | End: 2020-05-26

## 2020-05-26 RX ORDER — HEPARIN SODIUM 1000 [USP'U]/ML
INJECTION, SOLUTION INTRAVENOUS; SUBCUTANEOUS
Status: COMPLETED | OUTPATIENT
Start: 2020-05-26 | End: 2020-05-26

## 2020-05-26 RX ORDER — 0.9 % SODIUM CHLORIDE 0.9 %
1000 INTRAVENOUS SOLUTION INTRAVENOUS
Status: DISCONTINUED | OUTPATIENT
Start: 2020-05-26 | End: 2020-05-29 | Stop reason: HOSPADM

## 2020-05-26 RX ORDER — FENTANYL CITRATE 50 UG/ML
50 INJECTION, SOLUTION INTRAMUSCULAR; INTRAVENOUS
Status: DISCONTINUED | OUTPATIENT
Start: 2020-05-26 | End: 2020-05-29 | Stop reason: HOSPADM

## 2020-05-26 RX ORDER — MIDAZOLAM HYDROCHLORIDE 1 MG/ML
0.5 INJECTION INTRAMUSCULAR; INTRAVENOUS
Status: DISCONTINUED | OUTPATIENT
Start: 2020-05-26 | End: 2020-05-29 | Stop reason: HOSPADM

## 2020-05-26 RX ORDER — HEPARIN SODIUM 1000 [USP'U]/ML
6000 INJECTION, SOLUTION INTRAVENOUS; SUBCUTANEOUS ONCE
Status: COMPLETED | OUTPATIENT
Start: 2020-05-26 | End: 2020-05-26

## 2020-05-26 RX ADMIN — SODIUM CHLORIDE 250 ML: 9 INJECTION, SOLUTION INTRAVENOUS at 09:35

## 2020-05-26 RX ADMIN — FENTANYL CITRATE 50 MCG: 50 INJECTION INTRAMUSCULAR; INTRAVENOUS at 10:15

## 2020-05-26 RX ADMIN — FENTANYL CITRATE 50 MCG: 50 INJECTION INTRAMUSCULAR; INTRAVENOUS at 09:48

## 2020-05-26 RX ADMIN — HEPARIN SODIUM 2000 UNITS: 1000 INJECTION, SOLUTION INTRAVENOUS; SUBCUTANEOUS at 10:31

## 2020-05-26 RX ADMIN — HEPARIN SODIUM 2000 UNITS: 1000 INJECTION, SOLUTION INTRAVENOUS; SUBCUTANEOUS at 10:43

## 2020-05-26 RX ADMIN — IODIXANOL 100 ML: 320 INJECTION, SOLUTION INTRAVASCULAR at 09:57

## 2020-05-26 RX ADMIN — HEPARIN SODIUM 3000 UNITS: 1000 INJECTION INTRAVENOUS; SUBCUTANEOUS at 11:08

## 2020-05-26 RX ADMIN — MIDAZOLAM HYDROCHLORIDE 0.5 MG: 1 INJECTION, SOLUTION INTRAMUSCULAR; INTRAVENOUS at 10:16

## 2020-05-26 RX ADMIN — HEPARIN SODIUM 3000 UNITS: 1000 INJECTION INTRAVENOUS; SUBCUTANEOUS at 10:57

## 2020-05-26 RX ADMIN — SODIUM CHLORIDE 250 ML: 9 INJECTION, SOLUTION INTRAVENOUS at 09:34

## 2020-05-26 RX ADMIN — MIDAZOLAM HYDROCHLORIDE 0.5 MG: 1 INJECTION, SOLUTION INTRAMUSCULAR; INTRAVENOUS at 09:48

## 2020-05-26 RX ADMIN — LIDOCAINE HYDROCHLORIDE 13 ML: 20 INJECTION, SOLUTION INFILTRATION; PERINEURAL at 09:50

## 2020-05-26 ASSESSMENT — PAIN DESCRIPTION - LOCATION: LOCATION: LEG

## 2020-05-26 ASSESSMENT — PAIN DESCRIPTION - FREQUENCY: FREQUENCY: INTERMITTENT

## 2020-05-26 ASSESSMENT — PAIN SCALES - GENERAL
PAINLEVEL_OUTOF10: 2
PAINLEVEL_OUTOF10: 0

## 2020-05-26 ASSESSMENT — PAIN DESCRIPTION - PAIN TYPE: TYPE: CHRONIC PAIN

## 2020-05-26 ASSESSMENT — PAIN DESCRIPTION - ORIENTATION: ORIENTATION: RIGHT

## 2020-05-26 ASSESSMENT — PAIN DESCRIPTION - DESCRIPTORS: DESCRIPTORS: CRAMPING

## 2020-05-26 ASSESSMENT — PAIN DESCRIPTION - PROGRESSION: CLINICAL_PROGRESSION: NOT CHANGED

## 2020-05-26 NOTE — PROGRESS NOTES
Post op care discussed with patient at bedside post RLE thrombectomy. 1. Office visit follow up this Thursday for post op check and suture removal.   2. Wear Rt leg thigh high compression stocking daily during day and off at bedtime. 3. Elevate RLE when possible. 4. Stay active without vigorous exercise  5. Do not return to work until after OV follow up Thursday. 6. Maintain OAC. 7. Take OTC Ibuprofen PRN RLE discomfort as bottle directed.    8. Call IR office with any adverse symptoms  Electronically signed by ZOE Richard CNP on 5/26/20 at 12:48 PM EDT

## 2020-05-26 NOTE — PROGRESS NOTES
9403 Pt ambulatory to CT holding room. Pt independently changed into hospital gown. Pt A&Ox4, skin warm and dry, respirations even and unlabored. Allergies, medications and history reviewed. Pt has been NPO since midnight. Pt states pain is 2/10 in right calf and is comfortable. Pt states right legs gets numb at times. Bilateral pedal pulses palpable 2+. Right leg swollen. Pt states the swelling went down. Procedure briefly explained. 6434 #20 right hand started. 0900 [facility] notified that pt is ready. Lord Romero at bedside, speaking with pt.     3446 Consent signed. 1103 Pt taken to cath lab room 3 via cart.

## 2020-05-26 NOTE — SEDATION DOCUMENTATION
contrast injection given through  ClotTriever sheath, fluoro images taken. Pt tolerating very well. 1041 , additional 2,000 units heparin given IV.     1044 Soft-Vu straight 5Fr flush catheter inserted sheath, hand contrast injection per DR Pipo Hwang using fluoro imaging. 1051 obtained blood for ACT level. Pt doing well. Vitals stable. 1053 3rd and 4th passes of Clot retriever device under fluoro guidance. Scant amount of fibrous clot removed. 1056  -  3,000 units IV heparin given per Dr Pipo Hwang order. Jet LangstonSightlogix digital catheter 0.035 inserted through sheath. (ref # [id number]  Exp 02/28/2022)    1105 Dr Pipo Hwang viewing Ivus images. Encouraged pt to relax, support given. 1107 . Additional 3,000 units Heparin given per Dr Pipo Hwang order. 1109 Ivus catheter removed and straight flush catheter reinserted. Dr Pipo Hwang giving pt breathing instructions during contrast injection, fluoro imaging. 1113 ACT drawn. Dr Pipo Hwang speaking with the pt. 200 Dr Pipo Hwang suturing around sheath with double knotted purse string suture. 1119 final . Sheath removed, manual pressure held per [facility] IR tech. Pt tolerating very well. 1136 slight oozing continues at site. Continue to hold manual pressure. 1138 Guaze and Tegaderm dressing applied. No bleeding noted, site soft. 1140 Procedure complete. Patient tolerated well, no ectopy noted on monitor. VSS. Patient denies complaints at this time. 1143 Patient assisted onto cart and taken to [facility] for recovery. Report given to [facility].      Total Sedation Given:  Versed:     1 mg       Fentanyl:   100 mcg      Total Contrast used: 100 ml

## 2020-05-27 ENCOUNTER — TELEPHONE (OUTPATIENT)
Dept: INTERVENTIONAL RADIOLOGY/VASCULAR | Age: 50
End: 2020-05-27

## 2020-05-27 ENCOUNTER — POST-OP TELEPHONE (OUTPATIENT)
Dept: INTERVENTIONAL RADIOLOGY/VASCULAR | Age: 50
End: 2020-05-27

## 2020-05-27 NOTE — PROGRESS NOTES
Spoke to wife, swelling decreased post procedure, site sore but no bleeding or extensive bruising. Pt will see Rai Slocumb tomorrow for suture removal.     No immediate complications from procedure.

## 2020-05-28 ENCOUNTER — OFFICE VISIT (OUTPATIENT)
Dept: INTERVENTIONAL RADIOLOGY/VASCULAR | Age: 50
End: 2020-05-28
Payer: COMMERCIAL

## 2020-05-28 VITALS
OXYGEN SATURATION: 94 % | DIASTOLIC BLOOD PRESSURE: 80 MMHG | WEIGHT: 253 LBS | SYSTOLIC BLOOD PRESSURE: 130 MMHG | HEART RATE: 86 BPM | RESPIRATION RATE: 16 BRPM | BODY MASS INDEX: 34.31 KG/M2

## 2020-05-28 LAB
PERFORMED ON: ABNORMAL
PERFORMED ON: NORMAL
PERFORMED ON: NORMAL
POC ACTIVATED CLOTTING TIME KAOLIN: 114 SEC (ref 82–152)
POC ACTIVATED CLOTTING TIME KAOLIN: 153 SEC (ref 82–152)
POC ACTIVATED CLOTTING TIME KAOLIN: 164 SEC (ref 82–152)
POC ACTIVATED CLOTTING TIME KAOLIN: 219 SEC (ref 82–152)
POC SAMPLE TYPE: ABNORMAL
POC SAMPLE TYPE: NORMAL
POC SAMPLE TYPE: NORMAL

## 2020-05-28 PROCEDURE — 99215 OFFICE O/P EST HI 40 MIN: CPT | Performed by: NURSE PRACTITIONER

## 2020-05-28 ASSESSMENT — ENCOUNTER SYMPTOMS
TROUBLE SWALLOWING: 0
SORE THROAT: 0
BACK PAIN: 1
WHEEZING: 0
RESPIRATORY NEGATIVE: 1
EYE DISCHARGE: 0
DIARRHEA: 0
CONSTIPATION: 0
EYE REDNESS: 0
SHORTNESS OF BREATH: 0
EYE PAIN: 0
EYES NEGATIVE: 1
COUGH: 0
SINUS PAIN: 0
VOMITING: 0
ABDOMINAL DISTENTION: 0
ABDOMINAL PAIN: 0
NAUSEA: 0
EYE ITCHING: 0

## 2020-05-28 NOTE — PROGRESS NOTES
2020    In office post operative follow up visit:     Subjective:   2020:   S/P day two right leg DVT thrombectomy. He is here for post operative evaluation and suture removal. He states his symptoms including right lower leg swelling and calf pain still remain however they have and continue to improve and decrease since the procedure. He is wearing the compression stocking thigh high daily during day. He continues to take [address] Eliquis. He denies any complications or complaints. TELEHEALTH EVALUATION -- Audio/Visual (During TFIQV-77 public health emergency)    Due to COVID 19 outbreak, patient's office visit was converted to a virtual visit. Patient was contacted and agreed to proceed with a virtual visit via Telephone Visit. Unable to connect via VV. The risks and benefits of converting to a virtual visit were discussed in light of the current infectious disease epidemic. Patient also understood that insurance coverage and co-pays are up to their individual insurance plans. HPI:    Denys Parks II (:  1970) has requested an audio/video evaluation for the following concern(s):    Referred by [facility] ED for evaluation of RLE acute DVT. Appears unprovoked. US yesterday in ED reports occlusive thrombus mid right femoral vein extending through popliteal vein. Superficial thrombus also noted in proximal Rt. SSV. Patent right CFV and proximal right FV. Right leg swelling mid thigh to feet onset x 1.5 weeks ago. Denies injury, long traveling. States no LE adverse symptoms prior to this. Pain to right lower leg he describes as muscle cramp he rates as 4/10. He states yesterday RLE was very red and today he states redness is minimal. He was initiated on Eliquis in ED yesterday along with being given a SQ Lovenox injection. Today he states redness and swelling have gone down some since yesterday but pain remains. Denies any H/O DVT or PE's. Denies any LE varicosities.    Does not wear is now restored and the vein lumen is widely patent. 2.  Intravascular ultrasound demonstrated stenosis in the right common iliac vein which is smooth and no apparent thrombus was seen. This might be transient due to position, therefore no intervention was done. Patient is scheduled to have a CT venogram    to further evaluate if there is any extrinsic compression versus just transient position dependent narrowing. If there is true stenosis, patient will be brought back to have stent placement.           DIAGNOSIS: Chronic right leg deep vein thrombus       COMMENTS: Patient began having swelling of the leg approximately 1 year ago       Additional clinical data:    54 years year old Male with chronic right lower extremity deep vein thrombus.       Procedure:   1.   Ultrasound-guided puncture the right popliteal vein. Ultrasound images were saved to PACS. 2.   Catheterization of the right popliteal vein, femoral vein, iliac vein and inferior vena cava through access from the right popliteal vein. 3.   Venogram of the popliteal vein, femoral vein, and right iliac vein. 4.   Mechanical thrombectomy thrombectomy of the right popliteal vein using the [employer] Clottreaver   5.   Mechanical thrombectomy thrombectomy of the right femoral vein using the [employer] Clottreaver   6.   Intravascular Ultrasound of the inferior vena cava and right iliac vein at site of stenosis of the iliac vein using the [facility] system. Intravascular ultrasound images were saved to PACS.    7.   IV conscious sedation 120 minute duration       Radiation dose: 824.79 mGy           Body of Report:   Pre-procedure evaluation confirmed that the patient was an appropriate candidate for conscious    sedation.  Adequate sedation was maintained during the entire procedure.  Vital signs, pulse    oximetry, and response to verbal commands were monitored and recorded by the nurse throughout the    procedure and the recovery period.  The flow sheet was

## 2020-06-11 ENCOUNTER — OFFICE VISIT (OUTPATIENT)
Dept: INTERVENTIONAL RADIOLOGY/VASCULAR | Age: 50
End: 2020-06-11
Payer: COMMERCIAL

## 2020-06-11 ENCOUNTER — OFFICE VISIT (OUTPATIENT)
Dept: FAMILY MEDICINE CLINIC | Age: 50
End: 2020-06-11
Payer: COMMERCIAL

## 2020-06-11 VITALS
BODY MASS INDEX: 35.18 KG/M2 | OXYGEN SATURATION: 97 % | WEIGHT: 259.4 LBS | TEMPERATURE: 98.6 F | SYSTOLIC BLOOD PRESSURE: 120 MMHG | DIASTOLIC BLOOD PRESSURE: 70 MMHG | HEART RATE: 88 BPM

## 2020-06-11 VITALS
BODY MASS INDEX: 35.27 KG/M2 | SYSTOLIC BLOOD PRESSURE: 130 MMHG | TEMPERATURE: 98.8 F | DIASTOLIC BLOOD PRESSURE: 68 MMHG | HEART RATE: 88 BPM | HEIGHT: 72 IN | WEIGHT: 260.4 LBS | OXYGEN SATURATION: 95 %

## 2020-06-11 PROCEDURE — 99213 OFFICE O/P EST LOW 20 MIN: CPT | Performed by: FAMILY MEDICINE

## 2020-06-11 PROCEDURE — 99215 OFFICE O/P EST HI 40 MIN: CPT | Performed by: NURSE PRACTITIONER

## 2020-06-11 ASSESSMENT — PATIENT HEALTH QUESTIONNAIRE - PHQ9
SUM OF ALL RESPONSES TO PHQ9 QUESTIONS 1 & 2: 0
2. FEELING DOWN, DEPRESSED OR HOPELESS: 0
1. LITTLE INTEREST OR PLEASURE IN DOING THINGS: 0
SUM OF ALL RESPONSES TO PHQ QUESTIONS 1-9: 0
SUM OF ALL RESPONSES TO PHQ QUESTIONS 1-9: 0

## 2020-06-11 ASSESSMENT — ENCOUNTER SYMPTOMS
COUGH: 0
DIARRHEA: 0
EYE REDNESS: 0
EYES NEGATIVE: 1
TROUBLE SWALLOWING: 0
SORE THROAT: 0
ABDOMINAL DISTENTION: 0
SINUS PAIN: 0
SHORTNESS OF BREATH: 0
RESPIRATORY NEGATIVE: 1
WHEEZING: 0
VOMITING: 0
EYE DISCHARGE: 0
EYE ITCHING: 0
CONSTIPATION: 0
ABDOMINAL PAIN: 0
NAUSEA: 0
BACK PAIN: 1
EYE PAIN: 0

## 2020-06-11 NOTE — PROGRESS NOTES
2020  Subjective: In office post operative follow up visit:   2010:   S/P two week right leg DVT thrombectomy. He continues to have RLE non pitting edema +1 knee distally to ankle area. He also continues to have right calf discomfort he's had since onset of DVT. He states that both edema and calf pain, although they still remain, are not as severe as prior to thrombectomy. He wears RLE thigh high compression stockings daily. Limits sodium intake. Continues to be active with working daily. Denies any worsening of RLE symptoms, shortness or breath, nausea, vomiting, fever. Has not yet gotten CT Pelvis to evaluate for any potential May Thurner Syndrome or Iliac vein compression. States he's otherwise been in good general health. In office post operative follow up visit:   2020:   S/P day two right leg DVT thrombectomy. He is here for post operative evaluation and suture removal. He states his symptoms including right lower leg swelling and calf pain still remain however they have and continue to improve and decrease since the procedure. He is wearing the compression stocking thigh high daily during day. He continues to take [address] Eliquis. He denies any complications or complaints. TELEHEALTH EVALUATION -- Audio/Visual (During DSYYK-95 public health emergency)  2020 HPI:Maverick Travis II (:  1970) has requested an audio/video evaluation for the following concern(s):  Referred by [facility] ED for evaluation of RLE acute DVT. Appears unprovoked. US yesterday in ED reports occlusive thrombus mid right femoral vein extending through popliteal vein. Superficial thrombus also noted in proximal Rt. SSV. Patent right CFV and proximal right FV. Right leg swelling mid thigh to feet onset x 1.5 weeks ago. Denies injury, long traveling. States no LE adverse symptoms prior to this. Pain to right lower leg he describes as muscle cramp he rates as 4/10.  He states yesterday RLE was very red and today he states redness is minimal. He was initiated on Eliquis in ED yesterday along with being given a SQ Lovenox injection. Today he states redness and swelling have gone down some since yesterday but pain remains. Denies any H/O DVT or PE's. Denies any LE varicosities. Does not wear compression stockings. States he attempted to wear OTC compression socks a few years ago but quit wearing them as they cut into his leg causing pain. States he has left calf pain as well but this has been going on for several years. Denies left leg swelling. Denies bilateral LE fatigue or heaviness. Family PCP Dr. Judith Neville follow up tomorrow for [facility] [place] management. Review of Systems   Constitutional: Negative. Negative for activity change, appetite change, chills, fatigue and fever. HENT: Negative. Negative for congestion, sinus pain, sore throat and trouble swallowing. Eyes: Negative. Negative for pain, discharge, redness and itching. Respiratory: Negative. Negative for cough, shortness of breath and wheezing. Cardiovascular: Positive for leg swelling (right leg mild knee distally. Now decreased since thrombectomy performed. ). Negative for chest pain and palpitations. Gastrointestinal: Negative for abdominal distention, abdominal pain, constipation, diarrhea, nausea and vomiting. Endocrine: Negative. Genitourinary: Negative. Negative for difficulty urinating, dysuria, frequency, hematuria and urgency. Musculoskeletal: Positive for back pain (chronic lower back ). Negative for neck pain and neck stiffness. Right posterior calf pain decreasing since thrombectomy x 2 weeks. Skin: Negative. Negative for rash and wound. Neurological: Positive for numbness (right anterior mid thigh. States not as severe since thrombectomy and is minimal. ). Negative for dizziness, light-headedness and headaches. Hematological: Negative. Does not bruise/bleed easily.    Psychiatric/Behavioral: Negative. Physical Exam  Constitutional:       General: He is not in acute distress. Appearance: Normal appearance. HENT:      Head: Normocephalic and atraumatic. Nose: Nose normal.   Eyes:      Pupils: Pupils are equal, round, and reactive to light. Neck:      Musculoskeletal: Normal range of motion and neck supple. Cardiovascular:      Rate and Rhythm: Normal rate and regular rhythm. Pulses: Normal pulses. Dorsalis pedis pulses are 2+ on the right side. Posterior tibial pulses are 2+ on the right side. Heart sounds: Normal heart sounds. Pulmonary:      Effort: Pulmonary effort is normal.   Abdominal:      General: Bowel sounds are normal. There is no distension. Tenderness: There is no abdominal tenderness. Musculoskeletal:         General: No tenderness or signs of injury. Right lower le+ Edema (+1 non pitting right lower leg knee distally to ankle) present. Skin:     General: Skin is warm and dry. Capillary Refill: Capillary refill takes 2 to 3 seconds. Neurological:      Mental Status: He is alert and oriented to person, place, and time. Psychiatric:         Mood and Affect: Mood normal.         Behavior: Behavior normal.           Impression   Impression:   1.   Successful mechanical thrombectomy of chronic thrombus involving the right popliteal vein and right femoral vein. Blood flow is now restored and the vein lumen is widely patent. 2.  Intravascular ultrasound demonstrated stenosis in the right common iliac vein which is smooth and no apparent thrombus was seen. This might be transient due to position, therefore no intervention was done. Patient is scheduled to have a CT venogram    to further evaluate if there is any extrinsic compression versus just transient position dependent narrowing.  If there is true stenosis, patient will be brought back to have stent placement.           DIAGNOSIS: Chronic right leg deep vein thrombus       COMMENTS: Patient began having swelling of the leg approximately 1 year ago       Additional clinical data:    54 years year old Male with chronic right lower extremity deep vein thrombus.       Procedure:   1.   Ultrasound-guided puncture the right popliteal vein. Ultrasound images were saved to PACS. 2.   Catheterization of the right popliteal vein, femoral vein, iliac vein and inferior vena cava through access from the right popliteal vein. 3.   Venogram of the popliteal vein, femoral vein, and right iliac vein. 4.   Mechanical thrombectomy thrombectomy of the right popliteal vein using the [employer] Clottreaver   5.   Mechanical thrombectomy thrombectomy of the right femoral vein using the [employer] Clottreaver   6.   Intravascular Ultrasound of the inferior vena cava and right iliac vein at site of stenosis of the iliac vein using the [facility] system. Intravascular ultrasound images were saved to PACS. 7.   IV conscious sedation 120 minute duration       Radiation dose: 824.79 mGy           Body of Report:   Pre-procedure evaluation confirmed that the patient was an appropriate candidate for conscious    sedation.  Adequate sedation was maintained during the entire procedure.  Vital signs, pulse    oximetry, and response to verbal commands were monitored and recorded by the nurse throughout the    procedure and the recovery period.  The flow sheet was placed in the medical record including the    medications and dosages used.  The patient returned to baseline neurologic and physiologic status    prior to leaving the department.  No immediate sedation related complications were noted.     Medication for conscious sedation was administered via IV route.  120 minutes of conscious    sedation was provided.       Informed and written consent was obtained from the patient following discussion of risks, benefits    and alternatives to this procedure.         Standard timeout of the patient was performed     The patient was placed prone on the angiography table. The right calf and thigh were prepped and draped in sterile fashion. Ultrasound was used to study the right popliteal vein which demonstrated chronic thrombus. Ultrasound images were saved to PACS. Local anesthesia was achieved with lidocaine. Under direct ultrasound imaging to 21-gauge micropuncture needle was advanced into the distal portion of the popliteal vein and ultrasound images were saved to PACS. A microwire was advanced. The needle was    exchanged for a transition sheath. Through the sheath in the popliteal vein, a venogram of the right lower extremity and right pelvis and inferior vena cava were performed. Venogram demonstrated chronic thrombus involving the entire popliteal vein and up    to the midportion of the right femoral vein. The right iliac vein demonstrated a smooth narrowing approximately 50%, though no apparent thrombus was seen. An [employer] [address] was advanced through the thrombus from the popliteal vein up to the    inferior vena cava. The tract was serially dilated and following that the [facility] [place] sheath was placed and deployed. IV heparin was given. The Clot[employer]aver basket was then advanced through the popliteal vein and femoral vein just beyond    the thrombus, the basket was opened and the device was pulled back to the sheath were was collapsed and removed. The basket was then emptied of an extensive amount of both subacute and chronic fibrotic thrombus. The basket was then placed back into the    vein and repeated pullback thrombectomy was performed 2 additional times for a total of 3 thrombectomy passes. Repeat venogram through a straight flush catheter placed through the sheath demonstrated a now widely patent popliteal vein and femoral vein    which are clear of any thrombus or stenosis.  To evaluate the previously noted apparent stenosis in the right iliac vein, a [employer] intravascular ultrasound system was advanced into the distal inferior vena cava and pulled back in real-time while    intravascular ultrasound imaging was being recorded. Intravascular ultrasound imaging confirmed an apparent narrowing of the right iliac vein of approximately 50%, though this also appear to be pulsatile, therefore it is not clear whether this is a    permanent stenosis or transient which is dependent on the patient's prone position. Therefore no intervention was performed today to the right iliac vein. Patient will eventually get a CT venogram of this area to further diagnose. The sheath was removed    and a pursestring suture was placed at the access site. Pressure was held until hemostasis. Patient tolerated the procedure well without any complications. Patient was recovered in the recovery area and discharged home with family in normal and stable    condition.             ASSESSMENT:  1. Acute DVT RLE: S/P day week DVT Thrombectomy. Occlusive thrombus mid right femoral vein extending through popliteal vein. Successful thrombectomy of both subacute as well as chronic appearing thrombus. Following the procedure, IVUS demonstrated no residual thrombus with widely patent femoral and popliteal vein. 2. Superficial thrombus also noted in proximal Rt. SSV.   3. IVUS showed what appears to be possible right Iliac vein stenosis of 50%. This could be possible transient on position dependence or permanent stenosis. 4. RLE Edema may be component of Post Thrombotic Syndrome if no other cause of edema found including May Thurner Syndrome with Iliac Vein Compression    PLAN:  1. CT Pelvis with contrast to evaluate possible right Iliac vein stenosis. If stenosis is noted and is significant enough impeding flow return, may consider stent placement. 2.   Continue with [address] [place] [address] with recommendations and follow up with PCP. 3.   Bilateral LE Venous workup rule out CVI.    4.   Continue wearing RLE compression stocking daily during day as

## 2020-06-11 NOTE — PROGRESS NOTES
Chief Complaint   Patient presents with    Follow-Up from Hospital     DVT   [facility]     hold off on colon       HPI:  Georgette Watkins II is a 47 y.o. male     3 week f/u  Actually saw Ye Eason for ER follow up     She suggested he follow up with me    Actually saw vascular service this morning    He is s/p thrombectomy of 2 dvts in right leg    Symptoms better    This dvt was unprovoked  No hypercoag workup has been done    Lab Results   Component Value Date    LABA1C 6.6 (H) 10/17/2019     No results found for: EAG        Wt Readings from Last 3 Encounters:   06/11/20 260 lb 6.4 oz (118.1 kg)   06/11/20 259 lb 6.4 oz (117.7 kg)   05/26/20 262 lb (118.8 kg)         Patient Active Problem List   Diagnosis    Vasovagal syncope    Diabetes mellitus ([facility]    Sleep apnea    Acute deep vein thrombosis (DVT) of right femoral vein (HCC)    Acute deep vein thrombosis (DVT) of right popliteal vein (HCC)    Superficial thrombophlebitis of right leg       Current Outpatient Medications   Medication Sig Dispense Refill    apixaban (ELIQUIS) 5 MG TABS tablet Take 1 tablet by mouth 2 times daily 60 tablet 5    tiZANidine (ZANAFLEX) 4 MG tablet take 1 tablet by mouth three times a day if needed for pain      nabumetone (RELAFEN) 750 MG tablet Take 750 mg by mouth daily      CPAP Machine MISC by Does not apply route 1 each 0     No current facility-administered medications for this visit.           Past Medical History:   Diagnosis Date    Diabetes mellitus ([address]     Kidney stone     Pneumonia     Sleep apnea     Syncope and collapse 5/1/2018    Vasovagal syncope 5/1/2018     Past Surgical History:   Procedure Laterality Date    CATARACT REMOVAL WITH IMPLANT Bilateral 2015     Family History   Problem Relation Age of Onset    Other Mother         COPD    Diabetes Father      Social History     Socioeconomic History    Marital status:      Spouse name: None    Number of children: None   

## 2020-06-15 ENCOUNTER — TELEPHONE (OUTPATIENT)
Dept: INTERVENTIONAL RADIOLOGY/VASCULAR | Age: 50
End: 2020-06-15

## 2020-06-15 PROBLEM — M79.605 ACHING LEG SYNDROME OF LEFT LOWER EXTREMITY: Status: ACTIVE | Noted: 2020-06-15

## 2020-06-15 PROBLEM — I87.001 POST-THROMBOTIC SYNDROME OF RIGHT LOWER EXTREMITY: Status: ACTIVE | Noted: 2020-06-15

## 2020-07-01 ENCOUNTER — HOSPITAL ENCOUNTER (OUTPATIENT)
Dept: CT IMAGING | Age: 50
Discharge: HOME | End: 2020-07-03
Payer: COMMERCIAL

## 2020-07-01 VITALS — BODY MASS INDEX: 34.27 KG/M2 | HEIGHT: 72 IN | WEIGHT: 253 LBS

## 2020-07-01 PROCEDURE — 72193 CT PELVIS W/DYE: CPT

## 2020-07-01 PROCEDURE — 6360000004 HC RX CONTRAST MEDICATION: Performed by: RADIOLOGY

## 2020-07-01 PROCEDURE — 72193 CT PELVIS W/DYE: CPT | Performed by: RADIOLOGY

## 2020-07-01 RX ADMIN — IOPAMIDOL 100 ML: 612 INJECTION, SOLUTION INTRAVENOUS at 15:05

## 2020-07-07 ENCOUNTER — OFFICE VISIT (OUTPATIENT)
Dept: INTERVENTIONAL RADIOLOGY/VASCULAR | Age: 50
End: 2020-07-07
Payer: COMMERCIAL

## 2020-07-07 VITALS
HEIGHT: 72 IN | OXYGEN SATURATION: 98 % | DIASTOLIC BLOOD PRESSURE: 86 MMHG | WEIGHT: 253 LBS | HEART RATE: 70 BPM | RESPIRATION RATE: 12 BRPM | SYSTOLIC BLOOD PRESSURE: 124 MMHG | BODY MASS INDEX: 34.27 KG/M2

## 2020-07-07 PROBLEM — I87.1 ILIAC VEIN STENOSIS, RIGHT: Status: ACTIVE | Noted: 2020-07-07

## 2020-07-07 PROCEDURE — 99215 OFFICE O/P EST HI 40 MIN: CPT | Performed by: NURSE PRACTITIONER

## 2020-07-07 ASSESSMENT — ENCOUNTER SYMPTOMS
EYE DISCHARGE: 0
BACK PAIN: 1
RESPIRATORY NEGATIVE: 1
SINUS PAIN: 0
WHEEZING: 0
EYE PAIN: 0
EYES NEGATIVE: 1
DIARRHEA: 0
SORE THROAT: 0
CONSTIPATION: 0
VOMITING: 0
EYE ITCHING: 0
NAUSEA: 0
COUGH: 0
EYE REDNESS: 0
ABDOMINAL DISTENTION: 0
ABDOMINAL PAIN: 0
TROUBLE SWALLOWING: 0
SHORTNESS OF BREATH: 0

## 2020-07-07 NOTE — PROGRESS NOTES
TELEHEALTH EVALUATION -- Audio/Visual (During [facility] public health emergency)  2020 HPI:Maverick Travis II (:  1970) has requested an audio/video evaluation for the following concern(s):  Referred by [facility] ED for evaluation of RLE acute DVT. Appears unprovoked. US yesterday in ED reports occlusive thrombus mid right femoral vein extending through popliteal vein. Superficial thrombus also noted in proximal Rt. SSV. Patent right CFV and proximal right FV. Right leg swelling mid thigh to feet onset x 1.5 weeks ago. Denies injury, long traveling. States no LE adverse symptoms prior to this. Pain to right lower leg he describes as muscle cramp he rates as 4/10. He states yesterday RLE was very red and today he states redness is minimal. He was initiated on Eliquis in ED yesterday along with being given a SQ Lovenox injection. Today he states redness and swelling have gone down some since yesterday but pain remains. Denies any H/O DVT or PE's. Denies any LE varicosities. Does not wear compression stockings. States he attempted to wear OTC compression socks a few years ago but quit wearing them as they cut into his leg causing pain. States he has left calf pain as well but this has been going on for several years. Denies left leg swelling. Denies bilateral LE fatigue or heaviness. Family PCP Dr. Judy Travis follow up tomorrow for [facility] management. Review of Systems   Constitutional: Negative. Negative for activity change, appetite change, chills, fatigue and fever. HENT: Negative. Negative for congestion, sinus pain, sore throat and trouble swallowing. Eyes: Negative. Negative for pain, discharge, redness and itching. Respiratory: Negative. Negative for cough, shortness of breath and wheezing. Cardiovascular: Negative for chest pain, palpitations and leg swelling.    Gastrointestinal: Negative for abdominal distention, abdominal pain, constipation, diarrhea, nausea and vomiting. Endocrine: Negative. Genitourinary: Negative. Negative for difficulty urinating, dysuria, frequency, hematuria and urgency. Musculoskeletal: Positive for back pain (chronic lower back ). Negative for neck pain and neck stiffness. Skin: Negative. Negative for rash and wound. Neurological: Negative for dizziness, light-headedness, numbness and headaches. Hematological: Negative. Does not bruise/bleed easily. Psychiatric/Behavioral: Negative. Vitals:    07/07/20 1037   BP: 124/86   Pulse: 70   Resp: 12   SpO2: 98%         Physical Exam  Constitutional:       General: He is not in acute distress. Appearance: Normal appearance. HENT:      Head: Normocephalic and atraumatic. Nose: Nose normal.   Eyes:      Pupils: Pupils are equal, round, and reactive to light. Neck:      Musculoskeletal: Normal range of motion and neck supple. Cardiovascular:      Rate and Rhythm: Normal rate and regular rhythm. Pulses: Normal pulses. Dorsalis pedis pulses are 2+ on the right side. Posterior tibial pulses are 2+ on the right side. Heart sounds: Normal heart sounds. Pulmonary:      Effort: Pulmonary effort is normal.   Abdominal:      General: Bowel sounds are normal. There is no distension. Tenderness: There is no abdominal tenderness. Musculoskeletal:         General: No tenderness or signs of injury. Right lower leg: No edema. Left lower leg: No edema. Skin:     General: Skin is warm and dry. Capillary Refill: Capillary refill takes 2 to 3 seconds. Neurological:      Mental Status: He is alert and oriented to person, place, and time. Psychiatric:         Mood and Affect: Mood normal.         Behavior: Behavior normal.           Impression   Impression:   1.   Successful mechanical thrombectomy of chronic thrombus involving the right popliteal vein and right femoral vein.  Blood flow is now restored and the vein lumen is widely patent. 2.  Intravascular ultrasound demonstrated stenosis in the right common iliac vein which is smooth and no apparent thrombus was seen. This might be transient due to position, therefore no intervention was done. Patient is scheduled to have a CT venogram    to further evaluate if there is any extrinsic compression versus just transient position dependent narrowing. If there is true stenosis, patient will be brought back to have stent placement.           DIAGNOSIS: Chronic right leg deep vein thrombus       COMMENTS: Patient began having swelling of the leg approximately 1 year ago       Additional clinical data:    54 years year old Male with chronic right lower extremity deep vein thrombus.       Procedure:   1.   Ultrasound-guided puncture the right popliteal vein. Ultrasound images were saved to PACS. 2.   Catheterization of the right popliteal vein, femoral vein, iliac vein and inferior vena cava through access from the right popliteal vein. 3.   Venogram of the popliteal vein, femoral vein, and right iliac vein. 4.   Mechanical thrombectomy thrombectomy of the right popliteal vein using the In[facility] Clottreaver   5.   Mechanical thrombectomy thrombectomy of the right femoral vein using the Inari Clottreaver   6.   Intravascular Ultrasound of the inferior vena cava and right iliac vein at site of stenosis of the iliac vein using the [employer] [facility] system. Intravascular ultrasound images were saved to PACS.    7.   IV conscious sedation 120 minute duration       Radiation dose: 824.79 mGy           Body of Report:   Pre-procedure evaluation confirmed that the patient was an appropriate candidate for conscious    sedation.  Adequate sedation was maintained during the entire procedure.  Vital signs, pulse    oximetry, and response to verbal commands were monitored and recorded by the nurse throughout the    procedure and the recovery period.  The flow sheet was placed in the medical record including the amount of both subacute and chronic fibrotic thrombus. The basket was then placed back into the    vein and repeated pullback thrombectomy was performed 2 additional times for a total of 3 thrombectomy passes. Repeat venogram through a straight flush catheter placed through the sheath demonstrated a now widely patent popliteal vein and femoral vein    which are clear of any thrombus or stenosis. To evaluate the previously noted apparent stenosis in the right iliac vein, a TUNJI intravascular ultrasound system was advanced into the distal inferior vena cava and pulled back in real-time while    intravascular ultrasound imaging was being recorded. Intravascular ultrasound imaging confirmed an apparent narrowing of the right iliac vein of approximately 50%, though this also appear to be pulsatile, therefore it is not clear whether this is a    permanent stenosis or transient which is dependent on the patient's prone position. Therefore no intervention was performed today to the right iliac vein. Patient will eventually get a CT venogram of this area to further diagnose. The sheath was removed    and a pursestring suture was placed at the access site. Pressure was held until hemostasis. Patient tolerated the procedure well without any complications. Patient was recovered in the recovery area and discharged home with family in normal and stable    condition.         No radiation information found for this patient   Impression   1.  There is mild to moderate compression of the right iliac vein just prior to the bifurcation of the internal and external iliac veins. This appears to be secondary to location of the vein between the iliopsoas muscle laterally, internal iliac artery    medially with the artery being compressed between the 2 structures. With the patient in current supine position this is leading to approximately 50% stenosis in the transverse dimension. This corresponds to the same area seen on venography.  If patient is    having symptoms, this may benefit from stent placement. Will correlate clinically and discuss options with the patient.       COMPARISON: Comparison is made to venogram dating May 26, 2020       DIAGNOSIS: Recent deep vein thrombus in the right leg and apparent compression of the iliac vein seen on venography and intravascular ultrasound       COMMENTS: Post DVT evaluation       TECHNIQUE: Spiral scanning of the pelvis was performed after administration of intravenous contrast.        CT Dose-Length Product (estimate related to radiation exposure from this exam):  8027  mGy*cm.         CT PELVIS:   There is mild to moderate compression of the right iliac vein just prior to the bifurcation of the internal and external iliac veins. This appears to be secondary to location of the vein between the iliopsoas muscle laterally, internal iliac artery    medially with the artery being compressed between the 2 structures. With the patient in current supine position this is leading to approximately 50% stenosis in the transverse dimension. This corresponds to the same area seen on venography. Atherosclerotic calcification of the lower aorta and its branches including the bilateral internal iliac arteries is seen. There is a rim of heavy calcification with mural thrombus in the distal abdominal aorta extending into the common iliac    bifurcation.  Scanning of the pelvis shows no signs of pelvic mass or pelvic fluid collection.  Pelvic bowel loops are unremarkable.             ASSESSMENT:  1.  US reports Right iliac vein compression prior to bifurcation, compressed between internal iliac artery and iliopsoas muscle with 50% stenosis. Today he states all RLE edema and pain are resolved since thrombectomy unless he is up on his feet for very long period of time at which then he will develop some mild edema and tenderness below the knee. 2.  Acute DVT RLE: S/P 6 weeks DVT Thrombectomy.  Occlusive thrombus mid right femoral vein extending through popliteal vein. Successful thrombectomy of both subacute as well as chronic appearing thrombus. Following the procedure, IVUS demonstrated no residual thrombus with widely patent femoral and popliteal vein. He is symptom free today. 3. LE Venous US negative for venous insufficiency. PLAN:  1. Since symptoms or RLE edema and pain are resolved, will not consider at this time stenting the area of stenosis to iliac vein. If in future he should develop worsening symptoms, then will re-evaluate need for stenting. Otherwise no further follow up care required in IR office. 2.   Continue with [address] [place] [address] with recommendations and follow up with PCP/Hematology. 3.   Suggest he continue wearing RLE compression stocking daily during day as tolerated and off Qhs. Wear as tolerated. Wear next 6 months, and when traveling/sitting for extended time to both LE's, or as advised by hematology.        --ZOE Ortez - CNP on 7/7/2020 at 11:52 AM

## 2020-10-12 ENCOUNTER — OFFICE VISIT (OUTPATIENT)
Dept: FAMILY MEDICINE CLINIC | Age: 50
End: 2020-10-12
Payer: COMMERCIAL

## 2020-10-12 VITALS
SYSTOLIC BLOOD PRESSURE: 128 MMHG | BODY MASS INDEX: 34.75 KG/M2 | HEIGHT: 72 IN | HEART RATE: 65 BPM | WEIGHT: 256.6 LBS | TEMPERATURE: 98 F | OXYGEN SATURATION: 97 % | DIASTOLIC BLOOD PRESSURE: 70 MMHG

## 2020-10-12 DIAGNOSIS — E11.9 TYPE 2 DIABETES MELLITUS WITHOUT COMPLICATION, WITHOUT LONG-TERM CURRENT USE OF INSULIN (HCC): ICD-10-CM

## 2020-10-12 LAB
ALBUMIN SERPL-MCNC: 4.3 G/DL (ref 3.5–4.6)
ALP BLD-CCNC: 78 U/L (ref 35–104)
ALT SERPL-CCNC: 19 U/L (ref 0–41)
ANION GAP SERPL CALCULATED.3IONS-SCNC: 12 MEQ/L (ref 9–15)
AST SERPL-CCNC: 14 U/L (ref 0–40)
BILIRUB SERPL-MCNC: <0.2 MG/DL (ref 0.2–0.7)
BUN BLDV-MCNC: 19 MG/DL (ref 6–20)
CALCIUM SERPL-MCNC: 9.8 MG/DL (ref 8.5–9.9)
CHLORIDE BLD-SCNC: 100 MEQ/L (ref 95–107)
CHOLESTEROL, TOTAL: 189 MG/DL (ref 0–199)
CO2: 26 MEQ/L (ref 20–31)
CREAT SERPL-MCNC: 1 MG/DL (ref 0.7–1.2)
CREATININE URINE: 184.3 MG/DL
GFR AFRICAN AMERICAN: >60
GFR NON-AFRICAN AMERICAN: >60
GLOBULIN: 2.7 G/DL (ref 2.3–3.5)
GLUCOSE BLD-MCNC: 82 MG/DL (ref 70–99)
HBA1C MFR BLD: 6.7 % (ref 4.8–5.9)
HDLC SERPL-MCNC: 35 MG/DL (ref 40–59)
LDL CHOLESTEROL CALCULATED: 107 MG/DL (ref 0–129)
MICROALBUMIN UR-MCNC: <1.2 MG/DL
MICROALBUMIN/CREAT UR-RTO: NORMAL MG/G (ref 0–30)
POTASSIUM SERPL-SCNC: 3.8 MEQ/L (ref 3.4–4.9)
SODIUM BLD-SCNC: 138 MEQ/L (ref 135–144)
TOTAL PROTEIN: 7 G/DL (ref 6.3–8)
TRIGL SERPL-MCNC: 234 MG/DL (ref 0–150)

## 2020-10-12 PROCEDURE — 99214 OFFICE O/P EST MOD 30 MIN: CPT | Performed by: FAMILY MEDICINE

## 2020-10-12 SDOH — ECONOMIC STABILITY: TRANSPORTATION INSECURITY
IN THE PAST 12 MONTHS, HAS THE LACK OF TRANSPORTATION KEPT YOU FROM MEDICAL APPOINTMENTS OR FROM GETTING MEDICATIONS?: NO

## 2020-10-12 SDOH — ECONOMIC STABILITY: FOOD INSECURITY: WITHIN THE PAST 12 MONTHS, YOU WORRIED THAT YOUR FOOD WOULD RUN OUT BEFORE YOU GOT MONEY TO BUY MORE.: NEVER TRUE

## 2020-10-12 SDOH — ECONOMIC STABILITY: FOOD INSECURITY: WITHIN THE PAST 12 MONTHS, THE FOOD YOU BOUGHT JUST DIDN'T LAST AND YOU DIDN'T HAVE MONEY TO GET MORE.: NEVER TRUE

## 2020-10-12 SDOH — ECONOMIC STABILITY: TRANSPORTATION INSECURITY
IN THE PAST 12 MONTHS, HAS LACK OF TRANSPORTATION KEPT YOU FROM MEETINGS, WORK, OR FROM GETTING THINGS NEEDED FOR DAILY LIVING?: NO

## 2020-10-12 SDOH — ECONOMIC STABILITY: INCOME INSECURITY: HOW HARD IS IT FOR YOU TO PAY FOR THE VERY BASICS LIKE FOOD, HOUSING, MEDICAL CARE, AND HEATING?: NOT HARD AT ALL

## 2020-10-12 NOTE — PROGRESS NOTES
None    Years of education: None    Highest education level: None   Occupational History    None   Social Needs    Financial resource strain: Not hard at all   [place] insecurity     Worry: Never true     Inability: Never true   [employer] needs     Medical: No     Non-medical: No   Tobacco Use    Smoking status: Current Some Day Smoker     Packs/day: 0.25     Years: 30.00     Pack years: 7.50     Types: Cigars    Smokeless tobacco: Never Used   Substance and Sexual Activity    Alcohol use: No     Comment: social    Drug use: No    Sexual activity: None   Lifestyle    Physical activity     Days per week: None     Minutes per session: None    Stress: None   Relationships    Social connections     Talks on phone: None     Gets together: None     Attends Restorationism service: None     Active member of club or organization: None     Attends meetings of clubs or organizations: None     Relationship status: None    Intimate partner violence     Fear of current or ex partner: None     Emotionally abused: None     Physically abused: None     Forced sexual activity: None   Other Topics Concern    None   Social History Narrative    None     No Known Allergies    Review of Systems:   General ROS: fatigue  Respiratory ROS: no cough, shortness of breath, or wheezing  Cardiovascular ROS: no chest pain or dyspnea on exertion  Gastrointestinal ROS: no abdominal pain, change in bowel habits, or black or bloody stools  Genito-Urinary ROS: no dysuria, trouble voiding  Musculoskeletal ROS: back pain ongoing, hip pain  Neurological ROS: negative for - behavioral changes, memory loss, numbness/tingling, tremors or weakness    In general patient otherwise reports feeling well.      Physical Exam:  /70   Pulse 65   Temp 98 °F (36.7 °C)   Ht 6' (1.829 m)   Wt 256 lb 9.6 oz (116.4 kg)   SpO2 97%   BMI 34.80 kg/m²     Gen: Well, NAD, Alert, Oriented x 3   HEENT: EOMI, eyes clear, MMM  Skin: multiple skin tags on neck  Neck: no significant lymphadenopathy or thyromegaly  Lungs: CTA B w/out Rales/Wheezes/Rhonchi, Good respiratory effort   Heart: RRR, S1S2, w/out M/R/G, non-displaced PMI    Ext: No C/C/E Bilaterally. Neuro: Neurovascularly intact w/ Sensory/Motor intact UE/LE Bilaterally. Foot exam done elswhere    Lab Results   Component Value Date    WBC 9.2 05/20/2020    HGB 15.4 05/20/2020    HCT 44.6 05/20/2020     05/20/2020    CHOL 173 10/17/2019    TRIG 132 10/17/2019    HDL 37 (L) 10/17/2019    ALT 25 05/20/2020    AST 26 05/20/2020     05/20/2020    K 3.8 05/20/2020     05/20/2020    CREATININE 0.80 05/20/2020    BUN 19 05/20/2020    CO2 27 05/20/2020    TSH 1.500 02/14/2019    INR 1.0 05/20/2020    LABA1C 6.6 (H) 10/17/2019    LABMICR 2.30 (H) 10/17/2019         A&P   Diagnosis Orders   1. Type 2 diabetes mellitus without complication, without long-term current use of insulin (HCC)  Hemoglobin A1C    Comprehensive Metabolic Panel    Lipid Panel     DIABETES FOOT EXAM    Microalbumin / Creatinine Urine Ratio   2. Screen for colon cancer  Cologuard   3. Low testosterone in male     4.  Sleep apnea, unspecified type       Low carb diet  Continued efforts at weight loss    Return to ortho  See orders    meds as ordered  cologuard    Wt Readings from Last 3 Encounters:   10/12/20 256 lb 9.6 oz (116.4 kg)   07/07/20 253 lb (114.8 kg)   07/01/20 253 lb (114.8 kg)         Jose Agee MD

## 2020-12-15 ENCOUNTER — TELEPHONE (OUTPATIENT)
Dept: FAMILY MEDICINE CLINIC | Age: 50
End: 2020-12-15

## 2021-02-04 DIAGNOSIS — Z12.11 SCREEN FOR COLON CANCER: ICD-10-CM

## 2021-02-12 ENCOUNTER — OFFICE VISIT (OUTPATIENT)
Dept: FAMILY MEDICINE CLINIC | Age: 51
End: 2021-02-12
Payer: COMMERCIAL

## 2021-02-12 VITALS
HEART RATE: 86 BPM | WEIGHT: 259.6 LBS | OXYGEN SATURATION: 96 % | TEMPERATURE: 98.1 F | HEIGHT: 72 IN | SYSTOLIC BLOOD PRESSURE: 132 MMHG | DIASTOLIC BLOOD PRESSURE: 76 MMHG | BODY MASS INDEX: 35.16 KG/M2

## 2021-02-12 DIAGNOSIS — E11.9 TYPE 2 DIABETES MELLITUS WITHOUT COMPLICATION, WITHOUT LONG-TERM CURRENT USE OF INSULIN (HCC): ICD-10-CM

## 2021-02-12 DIAGNOSIS — G47.30 SLEEP APNEA, UNSPECIFIED TYPE: ICD-10-CM

## 2021-02-12 DIAGNOSIS — D68.59 HYPERCOAGULABLE STATE (HCC): ICD-10-CM

## 2021-02-12 DIAGNOSIS — E11.9 TYPE 2 DIABETES MELLITUS WITHOUT COMPLICATION, WITHOUT LONG-TERM CURRENT USE OF INSULIN (HCC): Primary | ICD-10-CM

## 2021-02-12 LAB — HBA1C MFR BLD: 6.6 % (ref 4.8–5.9)

## 2021-02-12 PROCEDURE — 99213 OFFICE O/P EST LOW 20 MIN: CPT | Performed by: FAMILY MEDICINE

## 2021-02-12 RX ORDER — CELECOXIB 200 MG/1
200 CAPSULE ORAL DAILY
Qty: 30 CAPSULE | Refills: 5 | Status: SHIPPED | OUTPATIENT
Start: 2021-02-12 | End: 2022-09-12 | Stop reason: ALTCHOICE

## 2021-02-12 ASSESSMENT — PATIENT HEALTH QUESTIONNAIRE - PHQ9
SUM OF ALL RESPONSES TO PHQ9 QUESTIONS 1 & 2: 0
2. FEELING DOWN, DEPRESSED OR HOPELESS: 0
SUM OF ALL RESPONSES TO PHQ QUESTIONS 1-9: 0

## 2021-02-12 NOTE — PROGRESS NOTES
Chief Complaint   Patient presents with    Diabetes     4 month        HPI:  Charly Hilliard is a 45 y.o. male     DM checkup, refills      Metformin for DM  Lab Results   Component Value Date    LABA1C 6.7 (H) 10/12/2020     No results found for: EAG    Weight stable    Still has back pain/leg pain/feet pain    Was tried on gabapentin and lyrica    Made him constipated    Stopped going to pain mgmt    Basically just dealing with the discomfort      Wt Readings from Last 3 Encounters:   02/12/21 259 lb 9.6 oz (117.8 kg)   10/12/20 256 lb 9.6 oz (116.4 kg)   07/07/20 253 lb (114.8 kg)         Patient Active Problem List   Diagnosis    Vasovagal syncope    Diabetes mellitus ([facility]    Sleep apnea    Acute deep vein thrombosis (DVT) of right femoral vein (HCC)    Acute deep vein thrombosis (DVT) of right popliteal vein (HCC)    Superficial thrombophlebitis of right leg    Post-thrombotic syndrome of right lower extremity    Aching leg syndrome of left lower extremity    Iliac vein stenosis, right       Current Outpatient Medications   Medication Sig Dispense Refill    celecoxib (CELEBREX) 200 MG capsule Take 1 capsule by mouth daily 30 capsule 5    CPAP Machine MISC by Does not apply route 1 each 0    apixaban (ELIQUIS) 5 MG TABS tablet Take 1 tablet by mouth 2 times daily (Patient not taking: Reported on 2/12/2021) 60 tablet 5     No current facility-administered medications for this visit.           Past Medical History:   Diagnosis Date    Diabetes mellitus ([facility]     Kidney stone     Pneumonia     Sleep apnea     Syncope and collapse 5/1/2018    Vasovagal syncope 5/1/2018     Past Surgical History:   Procedure Laterality Date    CATARACT REMOVAL WITH IMPLANT Bilateral 2015     Family History   Problem Relation Age of Onset    Other Mother         COPD    Diabetes Father      Social History     Socioeconomic History    Marital status:      Spouse name: None    Number of children: None  Years of education: None    Highest education level: None   Occupational History    None   Social Needs    Financial resource strain: Not hard at all   [place] insecurity     Worry: Never true     Inability: Never true   yoone needs     Medical: No     Non-medical: No   Tobacco Use    Smoking status: Current Some Day Smoker     Packs/day: 0.25     Years: 30.00     Pack years: 7.50     Types: Cigars    Smokeless tobacco: Never Used   Substance and Sexual Activity    Alcohol use: No     Comment: social    Drug use: No    Sexual activity: None   Lifestyle    Physical activity     Days per week: None     Minutes per session: None    Stress: None   Relationships    Social connections     Talks on phone: None     Gets together: None     Attends Faith service: None     Active member of club or organization: None     Attends meetings of clubs or organizations: None     Relationship status: None    Intimate partner violence     Fear of current or ex partner: None     Emotionally abused: None     Physically abused: None     Forced sexual activity: None   Other Topics Concern    None   Social History Narrative    None     No Known Allergies    Review of Systems:   General ROS: fatigue  Respiratory ROS: no cough, shortness of breath, or wheezing  Cardiovascular ROS: no chest pain or dyspnea on exertion  Gastrointestinal ROS: no abdominal pain, change in bowel habits, or black or bloody stools  Genito-Urinary ROS: no dysuria, trouble voiding  Musculoskeletal ROS: back pain ongoing, hip pain  Neurological ROS: numbness in feet    In general patient otherwise reports feeling well.      Physical Exam:  /76 (Site: Left Upper Arm)   Pulse 86   Temp 98.1 °F (36.7 °C)   Ht 6' (1.829 m)   Wt 259 lb 9.6 oz (117.8 kg)   SpO2 96%   BMI 35.21 kg/m²     Gen: Well, NAD, Alert, Oriented x 3   HEENT: EOMI, eyes clear, MMM  Skin: multiple skin tags on neck  Neck: no significant lymphadenopathy or thyromegaly  Lungs: CTA B w/out Rales/Wheezes/Rhonchi, Good respiratory effort   Heart: RRR, S1S2, w/out M/R/G, non-displaced PMI    Ext: No C/C/E Bilaterally. Neuro: Neurovascularly intact w/ Sensory/Motor intact UE/LE Bilaterally. Foot exam done elswhere    Lab Results   Component Value Date    WBC 9.2 05/20/2020    HGB 15.4 05/20/2020    HCT 44.6 05/20/2020     05/20/2020    CHOL 189 10/12/2020    TRIG 234 (H) 10/12/2020    HDL 35 (L) 10/12/2020    ALT 19 10/12/2020    AST 14 10/12/2020     10/12/2020    K 3.8 10/12/2020     10/12/2020    CREATININE 1.00 10/12/2020    BUN 19 10/12/2020    CO2 26 10/12/2020    TSH 1.500 02/14/2019    INR 1.0 05/20/2020    LABA1C 6.7 (H) 10/12/2020    LABMICR <1.20 10/12/2020         A&P   Diagnosis Orders   1. Type 2 diabetes mellitus without complication, without long-term current use of insulin (HCC)  celecoxib (CELEBREX) 200 MG capsule    Hemoglobin A1C   2. Hypercoagulable state ([facility]     3.  Sleep apnea, unspecified type         Low carb diet  Continued efforts at weight loss    See orders    meds as ordered    Warned of the risks with celebrex and blood thinner  Use only short term and as needed  Stop with any issues immediately and let us know      Wt Readings from Last 3 Encounters:   02/12/21 259 lb 9.6 oz (117.8 kg)   10/12/20 256 lb 9.6 oz (116.4 kg)   07/07/20 253 lb (114.8 kg)         Sabrina Harrison MD

## 2021-08-26 ENCOUNTER — APPOINTMENT (OUTPATIENT)
Dept: ULTRASOUND IMAGING | Age: 51
End: 2021-08-26
Payer: COMMERCIAL

## 2021-08-26 ENCOUNTER — HOSPITAL ENCOUNTER (EMERGENCY)
Age: 51
Discharge: HOME | End: 2021-08-26
Payer: COMMERCIAL

## 2021-08-26 ENCOUNTER — NURSE TRIAGE (OUTPATIENT)
Dept: OTHER | Facility: CLINIC | Age: 51
End: 2021-08-26

## 2021-08-26 VITALS
RESPIRATION RATE: 18 BRPM | TEMPERATURE: 98.5 F | DIASTOLIC BLOOD PRESSURE: 73 MMHG | BODY MASS INDEX: 33.86 KG/M2 | HEART RATE: 71 BPM | SYSTOLIC BLOOD PRESSURE: 131 MMHG | OXYGEN SATURATION: 99 % | HEIGHT: 72 IN | WEIGHT: 250 LBS

## 2021-08-26 DIAGNOSIS — I82.411 ACUTE DEEP VEIN THROMBOSIS (DVT) OF FEMORAL VEIN OF RIGHT LOWER EXTREMITY (HCC): Primary | ICD-10-CM

## 2021-08-26 LAB
ALBUMIN SERPL-MCNC: 4.1 G/DL (ref 3.5–4.6)
ALP BLD-CCNC: 77 U/L (ref 35–104)
ALT SERPL-CCNC: 21 U/L (ref 0–41)
ANION GAP SERPL CALCULATED.3IONS-SCNC: 10 MEQ/L (ref 9–15)
APTT: 27.6 SEC (ref 24.4–36.8)
AST SERPL-CCNC: 18 U/L (ref 0–40)
BASOPHILS ABSOLUTE: 0.1 K/UL (ref 0–0.2)
BASOPHILS RELATIVE PERCENT: 0.8 %
BILIRUB SERPL-MCNC: <0.2 MG/DL (ref 0.2–0.7)
BUN BLDV-MCNC: 18 MG/DL (ref 6–20)
CALCIUM SERPL-MCNC: 9.3 MG/DL (ref 8.5–9.9)
CHLORIDE BLD-SCNC: 101 MEQ/L (ref 95–107)
CO2: 27 MEQ/L (ref 20–31)
CREAT SERPL-MCNC: 1 MG/DL (ref 0.7–1.2)
EOSINOPHILS ABSOLUTE: 0.2 K/UL (ref 0–0.7)
EOSINOPHILS RELATIVE PERCENT: 2.1 %
GFR AFRICAN AMERICAN: >60
GFR NON-AFRICAN AMERICAN: >60
GLOBULIN: 2.7 G/DL (ref 2.3–3.5)
GLUCOSE BLD-MCNC: 119 MG/DL (ref 70–99)
HCT VFR BLD CALC: 48.3 % (ref 42–52)
HEMOGLOBIN: 16.5 G/DL (ref 14–18)
INR BLD: 1
LYMPHOCYTES ABSOLUTE: 2.9 K/UL (ref 1–4.8)
LYMPHOCYTES RELATIVE PERCENT: 38.7 %
MCH RBC QN AUTO: 33.6 PG (ref 27–31.3)
MCHC RBC AUTO-ENTMCNC: 34.2 % (ref 33–37)
MCV RBC AUTO: 98.4 FL (ref 80–100)
MONOCYTES ABSOLUTE: 0.8 K/UL (ref 0.2–0.8)
MONOCYTES RELATIVE PERCENT: 10.1 %
NEUTROPHILS ABSOLUTE: 3.6 K/UL (ref 1.4–6.5)
NEUTROPHILS RELATIVE PERCENT: 48.3 %
PDW BLD-RTO: 14 % (ref 11.5–14.5)
PLATELET # BLD: 176 K/UL (ref 130–400)
POTASSIUM SERPL-SCNC: 4.2 MEQ/L (ref 3.4–4.9)
PROTHROMBIN TIME: 12.8 SEC (ref 12.3–14.9)
RBC # BLD: 4.91 M/UL (ref 4.7–6.1)
SODIUM BLD-SCNC: 138 MEQ/L (ref 135–144)
TOTAL PROTEIN: 6.8 G/DL (ref 6.3–8)
WBC # BLD: 7.4 K/UL (ref 4.8–10.8)

## 2021-08-26 PROCEDURE — 85025 COMPLETE CBC W/AUTO DIFF WBC: CPT

## 2021-08-26 PROCEDURE — 93971 EXTREMITY STUDY: CPT

## 2021-08-26 PROCEDURE — 99284 EMERGENCY DEPT VISIT MOD MDM: CPT

## 2021-08-26 PROCEDURE — 85610 PROTHROMBIN TIME: CPT

## 2021-08-26 PROCEDURE — 85730 THROMBOPLASTIN TIME PARTIAL: CPT

## 2021-08-26 PROCEDURE — 80053 COMPREHEN METABOLIC PANEL: CPT

## 2021-08-26 PROCEDURE — 6360000002 HC RX W HCPCS: Performed by: PHYSICIAN ASSISTANT

## 2021-08-26 PROCEDURE — 36415 COLL VENOUS BLD VENIPUNCTURE: CPT

## 2021-08-26 PROCEDURE — 96372 THER/PROPH/DIAG INJ SC/IM: CPT

## 2021-08-26 RX ADMIN — ENOXAPARIN SODIUM 120 MG: 120 INJECTION SUBCUTANEOUS at 18:23

## 2021-08-26 ASSESSMENT — PAIN DESCRIPTION - ORIENTATION: ORIENTATION: RIGHT

## 2021-08-26 ASSESSMENT — PAIN DESCRIPTION - PAIN TYPE: TYPE: ACUTE PAIN

## 2021-08-26 ASSESSMENT — PAIN DESCRIPTION - ONSET: ONSET: ON-GOING

## 2021-08-26 ASSESSMENT — PAIN SCALES - GENERAL: PAINLEVEL_OUTOF10: 3

## 2021-08-26 ASSESSMENT — PAIN DESCRIPTION - FREQUENCY: FREQUENCY: CONTINUOUS

## 2021-08-26 ASSESSMENT — PAIN DESCRIPTION - LOCATION: LOCATION: LEG

## 2021-08-26 ASSESSMENT — PAIN DESCRIPTION - DESCRIPTORS: DESCRIPTORS: CRAMPING

## 2021-08-26 NOTE — TELEPHONE ENCOUNTER
Reason for Disposition   Thigh or calf pain and only 1 side and present > 1 hour    Answer Assessment - Initial Assessment Questions  1. ONSET: \"When did the swelling start? \" (e.g., minutes, hours, days)      About 1 week ago    2. LOCATION: \"What part of the leg is swollen? \"  \"Are both legs swollen or just one leg? \"      Right leg- calf to ankle    3. SEVERITY: \"How bad is the swelling? \" (e.g., localized; mild, moderate, severe)   - Localized - small area of swelling localized to one leg   - MILD pedal edema - swelling limited to foot and ankle, pitting edema < 1/4 inch (6 mm) deep, rest and elevation eliminate most or all swelling   - MODERATE edema - swelling of lower leg to knee, pitting edema > 1/4 inch (6 mm) deep, rest and elevation only partially reduce swelling   - SEVERE edema - swelling extends above knee, facial or hand swelling present       Calf to ankle, does not extend above knee    4. REDNESS: \"Does the swelling look red or infected? \"      Redness, leg is hot to the touch    5. PAIN: \"Is the swelling painful to touch? \" If so, ask: \"How painful is it? \"   (Scale 1-10; mild, moderate or severe)      Calf pain - feels like a muscle cramp    6. FEVER: \"Do you have a fever? \" If so, ask: \"What is it, how was it measured, and when did it start? \"       Denies    7. CAUSE: \"What do you think is causing the leg swelling? \"      Unsure    8. MEDICAL HISTORY: \"Do you have a history of heart failure, kidney disease, liver failure, or cancer? \"      History blood clot in right leg    9. RECURRENT SYMPTOM: \"Have you had leg swelling before? \" If so, ask: \"When was the last time? \" \"What happened that time? \"      Yes- history of blood clot, not currently on blood thinner    10. OTHER SYMPTOMS: \"Do you have any other symptoms? \" (e.g., chest pain, difficulty breathing)        Denies    11. PREGNANCY: \"Is there any chance you are pregnant? \" \"When was your last menstrual period? \"        n/a    Protocols used: LEG SWELLING AND EDEMA-ADULT-OH    Received call from [facility] [employer] with Red Flag Complaint. Brief description of triage: Patient experiencing swelling and pain in right calf. Calf is hot to the touch and red. Triage indicates for patient to 2nd level triage completed with Katie Bennett NP; provider recommends patient be seen in ED now    Care advice provided, patient verbalizes understanding; denies any other questions or concerns; instructed to call back for any new or worsening symptoms. Attention Provider: Thank you for allowing me to participate in the care of your patient. The patient was connected to triage in response to information provided to the ECC. Please do not respond through this encounter as the response is not directed to a shared pool.

## 2021-08-26 NOTE — ED PROVIDER NOTES
[address] [facility] ED  eMERGENCY dEPARTMENT eNCOUnter      Pt Name: Tahira Gray  MRN: [id number]  [place] 1970  Date of evaluation: 8/26/2021  Provider: Chelsie Noel PA-C    CHIEF COMPLAINT       Chief Complaint   Patient presents with    Leg Swelling     Right leg swelling with redness and warmth; hx of DVT         HISTORY OF PRESENT ILLNESS   (Location/Symptom, Timing/Onset,Context/Setting, Quality, Duration, Modifying Factors, Severity)  Note limiting factors. Socorro Jarquin II is a 45 y.o. male who presents to the emergency department with complaint of right calf pain, redness, and warmth, which patient states started approximately 2 days ago for him, he does have a history of DVTs, he states he was on Eliquis at one time, but has not taken it for the last 6 months, he states he quit taking on his own because it was too expensive he could not afford it. Patient denies any acute injury, states a mild crampy pain to the medial aspect of the right calf, with no radiation, there is no cough, no fevers, no shortness of breath. Patient states pain is approximately 3 out of 10. Past medical history significant for diabetes, kidney stones, DVT    HPI    NursingNotes were reviewed. REVIEW OF SYSTEMS    (2-9 systems for level 4, 10 or more for level 5)     Review of Systems   Constitutional: Negative for activity change and appetite change. HENT: Negative for congestion, ear discharge, ear pain, nosebleeds, rhinorrhea and sore throat. Eyes: Negative for discharge. Respiratory: Negative for shortness of breath. Cardiovascular: Negative for chest pain, palpitations and leg swelling. Gastrointestinal: Negative for abdominal distention, abdominal pain and constipation. Genitourinary: Negative for difficulty urinating and dysuria. Musculoskeletal: Negative for arthralgias. Right lower leg pain and erythema   Skin: Negative for color change.    Neurological: Negative for dizziness, syncope, numbness and headaches. Psychiatric/Behavioral: Negative for agitation and confusion. Except as noted above the remainder of the review of systems was reviewed and negative. PAST MEDICAL HISTORY     Past Medical History:   Diagnosis Date    Diabetes mellitus ([address]     Kidney stone     Pneumonia     Sleep apnea     Syncope and collapse 5/1/2018    Vasovagal syncope 5/1/2018         SURGICALHISTORY       Past Surgical History:   Procedure Laterality Date    CATARACT REMOVAL WITH IMPLANT Bilateral 2015         CURRENT MEDICATIONS       Discharge Medication List as of 8/26/2021  6:02 PM      CONTINUE these medications which have NOT CHANGED    Details   CPAP Machine MISC Starting Fri 8/31/2018, Disp-1 each, R-0, Print      celecoxib (CELEBREX) 200 MG capsule Take 1 capsule by mouth daily, Disp-30 capsule, R-5Normal             ALLERGIES     Patient has no known allergies.     FAMILY HISTORY       Family History   Problem Relation Age of Onset    Other Mother         COPD    Diabetes Father           SOCIAL HISTORY       Social History     Socioeconomic History    Marital status:      Spouse name: None    Number of children: None    Years of education: None    Highest education level: None   Occupational History    None   Tobacco Use    Smoking status: Current Some Day Smoker     Packs/day: 0.25     Years: 30.00     Pack years: 7.50     Types: Cigars    Smokeless tobacco: Never Used   Vaping Use    Vaping Use: Never used   Substance and Sexual Activity    Alcohol use: No     Comment: social    Drug use: No    Sexual activity: None   Other Topics Concern    None   Social History Narrative    None     Social Determinants of Health     Financial Resource Strain: Low Risk     Difficulty of Paying Living Expenses: Not hard at all   Food Insecurity: No Food Insecurity    Worried About Running Out of Food in the Last Year: Never true    Iris of Food in the Last Effort: Pulmonary effort is normal. No tachypnea, accessory muscle usage, respiratory distress or retractions. Breath sounds: No stridor. No wheezing, rhonchi or rales. Chest:      Chest wall: No tenderness. Abdominal:      General: Abdomen is flat. Bowel sounds are normal. There is no distension or abdominal bruit. Palpations: There is no shifting dullness, fluid wave, hepatomegaly, splenomegaly, mass or pulsatile mass. Tenderness: There is no abdominal tenderness. There is no right CVA tenderness, left CVA tenderness, guarding or rebound. Negative signs include Garcia's sign, Rovsing's sign and McBurney's sign. Musculoskeletal:         General: No deformity. Cervical back: Normal range of motion and neck supple. No rigidity. Legs:       Comments: Patient has mild edema noted to right lower extremity, 1+ pitting edema. Mild erythema, negative Homans exam, positive dorsalis pedis pulses. Skin is pink warm and dry. Good sensation. Skin is warm to the touch. Skin:     General: Skin is warm and dry. Capillary Refill: Capillary refill takes less than 2 seconds. Coloration: Skin is not jaundiced. Neurological:      General: No focal deficit present. Mental Status: He is alert and oriented to person, place, and time. Mental status is at baseline. Cranial Nerves: No cranial nerve deficit. Sensory: No sensory deficit. Motor: No weakness.       Coordination: Coordination normal.   Psychiatric:         Mood and Affect: Mood normal.         DIAGNOSTIC RESULTS     EKG: All EKG's are interpreted by the Emergency Department Physician who either signs or Co-signsthis chart in the absence of a cardiologist.        RADIOLOGY:   Non-plain filmimages such as CT, Ultrasound and MRI are read by the radiologist. Plain radiographic images are visualized and preliminarily interpreted by the emergency physician with the below findings:        Interpretation per the Radiologist below, if available at the time ofthis note:    US DUP LOWER EXTREMITY RIGHT MIKE   Final Result   This study is positive for DVT in the right lower extremity. ED BEDSIDE ULTRASOUND:   Performed by ED Physician - none    LABS:  Labs Reviewed   COMPREHENSIVE METABOLIC PANEL - Abnormal; Notable for the following components:       Result Value    Glucose 119 (*)     All other components within normal limits   CBC WITH AUTO DIFFERENTIAL - Abnormal; Notable for the following components:    MCH 33.6 (*)     All other components within normal limits   PROTIME-INR   APTT       All other labs were within normal range or not returned as of this dictation. EMERGENCY DEPARTMENT COURSE and DIFFERENTIAL DIAGNOSIS/MDM:   Vitals:    Vitals:    08/26/21 1553 08/26/21 1825   BP: 124/77 131/73   Pulse: 93 71   Resp: 18 18   Temp: 98.5 °F (36.9 °C)    TempSrc: Oral    SpO2: 95% 99%   Weight: 250 lb (113.4 kg)    Height: 6' (1.829 m)             MDM  Number of Diagnoses or Management Options  Acute deep vein thrombosis (DVT) of femoral vein of right lower extremity (HCC)  Diagnosis management comments: Mikhail Bradley presents ED with complaint of right lower extremity calf pain which is been ongoing for last 2 to 3 days, patient does have past history of DVTs in the past, he denies any acute injury. He had stopped taking his Eliquis approximately 6 months ago, he states it was too expensive for him he could not continue the use. He denies any other complaints, no chest pain or shortness of breath. Ultrasound was completed which does show acute DVT of right lower extremity. Patient was given a shot of Lovenox, started on Eliquis. Advised to contact his family physician for follow-up, as well as given referral to Dr. Paty Mann of interventional radiology. Patient was advised if he has any worsening or changes symptoms, return to the ER for reevaluation.       CRITICAL CARE TIME   Total Critical Care time was 0 minutes, excluding separately reportableprocedures. There was a high probability of clinicallysignificant/life threatening deterioration in the patient's condition which required my urgent intervention. CONSULTS:  None    PROCEDURES:  Unless otherwise noted below, none     Procedures    FINAL IMPRESSION      1.  Acute deep vein thrombosis (DVT) of femoral vein of right lower extremity [facility])          DISPOSITION/PLAN   DISPOSITION Decision To Discharge 08/26/2021 06:43:20 PM      PATIENT REFERRED TO:  Courtney Evangelista MD  [address] [facility]  [phone]    In 2 days      Brooks Erwin MD  [address] [facility]  [address] [facility]  [phone]    In 3 days      [facility]  [address] [address]  [address] [facility] [address]  [phone]          DISCHARGE MEDICATIONS:  Discharge Medication List as of 8/26/2021  6:02 PM      START taking these medications    Details   apixaban starter pack (ELIQUIS DVT/PE STARTER PACK) 5 MG TBPK tablet Take 1 tablet by mouth See Admin Instructions, Disp-74 tablet, R-0Print                (Please note that portions of this note were completed with a voice recognition program.  Efforts were made to edit the dictations but occasionally words are mis-transcribed.)    Zenaida Essex, PA-C (electronically signed)  Attending Emergency Physician         Zenaida Essex, PA-C  08/26/21 [address] [facility]  08/27/21 [id number]

## 2021-08-26 NOTE — ED NOTES
Pt has co right lower leg swelling and pain  Pt states he has a hx of DVT and that is what it feels like. Pt states he was supposed to take blood thinners but he took himself off. Pt states he has pain 4/10   Nurse noted skin is red warm and edematous pulses are intact.      Timo Ruiz RN  08/26/21 2237

## 2021-08-27 ASSESSMENT — ENCOUNTER SYMPTOMS
SHORTNESS OF BREATH: 0
ABDOMINAL DISTENTION: 0
RHINORRHEA: 0
CONSTIPATION: 0
COLOR CHANGE: 0
ABDOMINAL PAIN: 0
EYE DISCHARGE: 0
SORE THROAT: 0

## 2021-08-31 ENCOUNTER — OFFICE VISIT (OUTPATIENT)
Dept: INTERVENTIONAL RADIOLOGY/VASCULAR | Age: 51
End: 2021-08-31
Payer: COMMERCIAL

## 2021-08-31 VITALS
DIASTOLIC BLOOD PRESSURE: 71 MMHG | WEIGHT: 250 LBS | HEART RATE: 71 BPM | SYSTOLIC BLOOD PRESSURE: 131 MMHG | BODY MASS INDEX: 33.91 KG/M2

## 2021-08-31 DIAGNOSIS — I82.411 ACUTE DEEP VEIN THROMBOSIS (DVT) OF RIGHT FEMORAL VEIN (HCC): Primary | ICD-10-CM

## 2021-08-31 DIAGNOSIS — M79.661 PAIN AND SWELLING OF LOWER LEG, RIGHT: ICD-10-CM

## 2021-08-31 DIAGNOSIS — I82.431 ACUTE DEEP VEIN THROMBOSIS (DVT) OF RIGHT POPLITEAL VEIN (HCC): ICD-10-CM

## 2021-08-31 DIAGNOSIS — M79.89 PAIN AND SWELLING OF LOWER LEG, RIGHT: ICD-10-CM

## 2021-08-31 DIAGNOSIS — Z79.01 ANTICOAGULATED: ICD-10-CM

## 2021-08-31 PROCEDURE — 99244 OFF/OP CNSLTJ NEW/EST MOD 40: CPT | Performed by: NURSE PRACTITIONER

## 2021-08-31 RX ORDER — GABAPENTIN 300 MG/1
300 CAPSULE ORAL 3 TIMES DAILY
COMMUNITY
Start: 2021-08-26 | End: 2021-09-08 | Stop reason: ALTCHOICE

## 2021-08-31 RX ORDER — METHYLPREDNISOLONE 4 MG/1
TABLET ORAL
COMMUNITY
Start: 2021-04-22 | End: 2021-09-08 | Stop reason: ALTCHOICE

## 2021-08-31 RX ORDER — ACETAMINOPHEN 500 MG
1000 TABLET ORAL EVERY 8 HOURS PRN
COMMUNITY

## 2021-08-31 ASSESSMENT — ENCOUNTER SYMPTOMS
RESPIRATORY NEGATIVE: 1
GASTROINTESTINAL NEGATIVE: 1
EYES NEGATIVE: 1

## 2021-08-31 NOTE — PROGRESS NOTES
Lacy Haro, a male of 45 y.o. came to the office 8/31/2021. Chief Complaint   Patient presents with    Leg Pain    Leg Swelling       8/31/2021 HPI: Naima West II referred by [facility] ED for evaluation of RLE DVT. Patient presents with symptoms of: Right lower leg with pain and swelling onset x 3 weeks ago. Denies injury. Was on Eliquis from previous H/O RLE DVT but quit taking it x 6 months ago as it is too expensive. He was reinitiated back on Eliquis in ED with Lovenox injection. Since ED visit the RLE edema and pain has decreased but still remains. US done in ED shows complete occlusion from Right common femoral vein extending through popliteal vein. Denies LLE symptoms    PAST HISTORY:   H/O RLE DVT. Last seen in office back in May 2020 for this as well as superficial thrombus to Right SSV. RLE venogam with Successful DVT thrombectomy was done May 2020.   2020 RLE venogram with IVUS demonstrated possible stenosis verses transient dependent on position in right common iliac vein of approximately 50%. 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 Due to fact that following RLE DVT thrombectomy patient symptoms were completely resolved no intervention of venous stenting was performed. Has seen Dr. Cristi Veronica in past and patient reports no evidence of clotting disorder. Leg elevation:  Daily with relief. Stocking use and dates: Wears daily class two compression stockings with moderate relief. Denies claudication. Denies chest pain. Denies shortness of breath. 7/7/2020:  Naima West II is here to discuss results of follow up abd. CT to evaluate for right iliac vein stenosis. S/P 6 week Rt LE DVT thrombectomy. Today he denies pain or swelling to RLE. States some mild tenderness to right posterior calf at catheter insertion site however this is improving daily since the procedure and is also almost resolved.  He is very pleased with results of thrombectomy. He will experience some mild RLE below distal calf swelling and tenderness If he is up on his feet for long period of time. Continues to wear compression stocking to RLE. Compliant with [facility] [employer]. PCP referred him to hematologist to evaluate for clotting disorder. He is to have follow up for this in August.      In office post operative follow up visit:   2010:   S/P two week right leg DVT thrombectomy. He continues to have RLE non pitting edema +1 knee distally to ankle area. He also continues to have right calf discomfort he's had since onset of DVT. He states that both edema and calf pain, although they still remain, are not as severe as prior to thrombectomy. He wears RLE thigh high compression stockings daily. Limits sodium intake. Continues to be active with working daily. Denies any worsening of RLE symptoms, shortness or breath, nausea, vomiting, fever. Has not yet gotten CT Pelvis to evaluate for any potential May Thurner Syndrome or Iliac vein compression. States he's otherwise been in good general health.         In office post operative follow up visit:   2020:   S/P day two right leg DVT thrombectomy. He is here for post operative evaluation and suture removal. He states his symptoms including right lower leg swelling and calf pain still remain however they have and continue to improve and decrease since the procedure. He is wearing the compression stocking thigh high daily during day. He continues to take [address] Eliquis. He denies any complications or complaints.            TELEHEALTH EVALUATION -- Audio/Visual (During [id number] public health emergency)  2020 HPI:Maverick Travis II (:  1970) has requested an audio/video evaluation for the following concern(s):  Referred by [facility] ED for evaluation of RLE acute DVT. Appears unprovoked. US yesterday in ED reports occlusive thrombus mid right femoral vein extending through popliteal vein.  Superficial thrombus also noted in proximal Rt. SSV. Patent right CFV and proximal right FV. Right leg swelling mid thigh to feet onset x 1.5 weeks ago. Denies injury, long traveling. States no LE adverse symptoms prior to this. Pain to right lower leg he describes as muscle cramp he rates as 4/10. He states yesterday RLE was very red and today he states redness is minimal. He was initiated on Eliquis in ED yesterday along with being given a SQ Lovenox injection. Today he states redness and swelling have gone down some since yesterday but pain remains. Denies any H/O DVT or PE's. Denies any LE varicosities. Does not wear compression stockings. States he attempted to wear OTC compression socks a few years ago but quit wearing them as they cut into his leg causing pain. States he has left calf pain as well but this has been going on for several years. Denies left leg swelling. Denies bilateral LE fatigue or heaviness.    Family PCP Dr. Marshall Foote follow up tomorrow for [address] [place] [address] [facility].            Family History   Problem Relation Age of Onset    Other Mother         COPD    Diabetes Father        Past Surgical History:   Procedure Laterality Date    CATARACT REMOVAL WITH IMPLANT Bilateral 2015        Past Medical History:   Diagnosis Date    Diabetes mellitus ([facility] [address]     Kidney stone     Pneumonia     Sleep apnea     Syncope and collapse 5/1/2018    Vasovagal syncope 5/1/2018       Social History     Socioeconomic History    Marital status:      Spouse name: Not on file    Number of children: Not on file    Years of education: Not on file    Highest education level: Not on file   Occupational History    Not on file   Tobacco Use    Smoking status: Current Some Day Smoker     Packs/day: 0.25     Years: 30.00     Pack years: 7.50     Types: Cigars    Smokeless tobacco: Never Used   Vaping Use    Vaping Use: Never used   Substance and Sexual Activity    Alcohol use: No     Comment: social    Drug use: No    Sexual activity: Not on file   Other Topics Concern    Not on file   Social History Narrative    Not on file     Social Determinants of Health     Financial Resource Strain: Low Risk     Difficulty of Paying Living Expenses: Not hard at all   Food Insecurity: No Food Insecurity    Worried About Running Out of Food in the Last Year: Never true    [address] in the Last Year: Never true   Transportation Needs: No Transportation Needs    Lack of Transportation (Medical): No    Lack of Transportation (Non-Medical): No   Physical Activity:     Days of Exercise per Week:     Minutes of Exercise per Session:    Stress:     Feeling of Stress :    Social Connections:     Frequency of Communication with Friends and Family:     Frequency of Social Gatherings with Friends and Family:     Attends Taoism Services:     Active Member of Clubs or Organizations:     Attends Club or Organization Meetings:     Marital Status:    Intimate Partner Violence:     Fear of Current or Ex-Partner:     Emotionally Abused:     Physically Abused:     Sexually Abused:        No Known Allergies    Current Outpatient Medications on File Prior to Visit   Medication Sig Dispense Refill    gabapentin (NEURONTIN) 300 MG capsule Take 300 mg by mouth 3 times daily.  methylPREDNISolone (MEDROL DOSEPACK) 4 MG tablet As Instructed per package      acetaminophen (TYLENOL) 500 MG tablet Take 1,000 mg by mouth every 8 hours as needed      apixaban starter pack (ELIQUIS DVT/PE STARTER PACK) 5 MG TBPK tablet Take 1 tablet by mouth See Admin Instructions 74 tablet 0    celecoxib (CELEBREX) 200 MG capsule Take 1 capsule by mouth daily 30 capsule 5    CPAP Machine MISC by Does not apply route 1 each 0     No current facility-administered medications on file prior to visit. Review of Systems   Constitutional: Negative. HENT: Negative. Eyes: Negative. Respiratory: Negative.     Cardiovascular: Positive for leg swelling (RIGHT LOWER LEG WITH MILD ACHING x 3 weeks ). Gastrointestinal: Negative. Endocrine: Negative. Genitourinary: Negative. Musculoskeletal: Negative. Skin: Negative. Neurological: Negative. Hematological: Negative. Psychiatric/Behavioral: Negative. OBJECTIVE:  /71   Pulse 71   Wt 250 lb (113.4 kg)   BMI 33.91 kg/m²     Physical Exam  Constitutional:       General: He is not in acute distress. Appearance: Normal appearance. He is not ill-appearing. HENT:      Head: Normocephalic and atraumatic. Nose: Nose normal. No congestion. Cardiovascular:      Rate and Rhythm: Normal rate. Pulses:           Dorsalis pedis pulses are 2+ on the right side. Posterior tibial pulses are 2+ on the right side. Heart sounds: Normal heart sounds. Comments: RLE warm to touch, no discoloration of cyanosis. Pulmonary:      Effort: Pulmonary effort is normal.   Abdominal:      Palpations: Abdomen is soft. Musculoskeletal:         General: Tenderness (right lower leg with palpation) present. No swelling, deformity or signs of injury. Cervical back: Normal range of motion. Right lower le+ Edema (LOWER LEG) present. Left lower leg: No edema. Skin:     General: Skin is warm and dry. Capillary Refill: Capillary refill takes 2 to 3 seconds. Findings: No bruising, erythema, lesion or rash. Neurological:      Mental Status: He is alert and oriented to person, place, and time.    Psychiatric:         Mood and Affect: Mood normal.         Behavior: Behavior normal.       2021:   Narrative   VENOUS DUPLEX ULTRASOUND OF THE RIGHT LOWER EXTREMITY       CLINICAL HISTORY:  pain and right calf, hx dvt        COMPARISON:  NONE AVAILABLE        TECHNIQUE:  Sonographic imaging of the deep venous system of the right lower extremity was performed by a registered sonographer and the images were submitted for interpretation.         FINDINGS:  The right common femoral, superficial femoral, and popliteal veins demonstrate no color flow, augmentation, and compressibility. The study is positive for DVT in the right lower extremity.       The calf veins are not well seen.           Impression   This study is positive for DVT in the right lower extremity.             ASSESSMENT AND PLAN:  Chart, medications, lab work reviewed. Diagnosis Orders   1. Acute deep vein thrombosis (DVT) of right femoral vein (HCC)  IR GUIDED THROMB [facility] VEIN    IR VENOGRAM LOWER EXTREMITY RIGHT    IR PTA VENOUS    total occlusion   2. Acute deep vein thrombosis (DVT) of right popliteal vein (HCC)  IR GUIDED THROMB [facility] VEIN    IR VENOGRAM LOWER EXTREMITY RIGHT    IR PTA VENOUS    total occlusion   3. Anticoagulated  IR GUIDED THROMB [facility] VEIN    IR VENOGRAM LOWER EXTREMITY RIGHT    IR PTA VENOUS   4. Pain and swelling of lower leg, right         Plan:     Orders Placed This Encounter   Procedures    IR GUIDED THROMB [facility] VEIN     Standing Status:   Future     Standing Expiration Date:   9/2/2022     Order Specific Question:   Reason for exam:     Answer:   RLE    IR VENOGRAM LOWER EXTREMITY RIGHT     Standing Status:   Future     Standing Expiration Date:   9/2/2022    IR PTA VENOUS     Standing Status:   Future     Standing Expiration Date:   9/2/2022     Order Specific Question:   Reason for exam:     Answer:   RLE     --  Procedure of DVT venogram with possible thrombectomy, small angioplasty, possible stent placement instructed to patient with risks including infection, bleeding, pain at site, thromboembolism, PE, medication reaction and/or allergy, damage to vessel. --   OV follow up 1-3 days post op for suture removal and evaluation. --   Continue to wear knee or thigh high 20-30 mmhg compression socks daily during day and off nightly, wear as tolerated.    --  Instructed on importance of compliance with [address] [place] [address] and to follow up with PCP for possible change to another more affordable medication. --  Instructed on s/sx bleed from [address] including dark tarry stools, coffee ground emesis, uncontrolled epistaxis or gums bleeding and to notify general practitioner or go to ED ASAP   --  Go to ED ASAP with any acute shortness of breath and/or chest pain.      Pamela Thomas, ZOE - CNP

## 2021-09-07 ENCOUNTER — TELEPHONE (OUTPATIENT)
Dept: INTERVENTIONAL RADIOLOGY/VASCULAR | Age: 51
End: 2021-09-07

## 2021-09-07 NOTE — TELEPHONE ENCOUNTER
PATIENT WAS GIVEN THIS INFORMATION PRIOR TO LEAVING THE OFFICE / VIA PHONE CONVERSATION - VOICED UNDERSTANDING  >  YOUR PROCEDURE IS SCHEDULED ON: 09/10/2021 @ 11  >  You will need to arrive at 9:30and check in at the Diagnostic Imaging Check In desk.   >  Do not eat or drink after midnight. Sips of water is acceptable ONLY to take medications. >  Make arrangements for transportation, as you should not drive immediately after.   >

## 2021-09-08 ENCOUNTER — OFFICE VISIT (OUTPATIENT)
Dept: FAMILY MEDICINE CLINIC | Age: 51
End: 2021-09-08
Payer: COMMERCIAL

## 2021-09-08 VITALS
TEMPERATURE: 98.9 F | OXYGEN SATURATION: 95 % | DIASTOLIC BLOOD PRESSURE: 70 MMHG | HEART RATE: 86 BPM | WEIGHT: 255 LBS | BODY MASS INDEX: 34.54 KG/M2 | SYSTOLIC BLOOD PRESSURE: 130 MMHG | HEIGHT: 72 IN

## 2021-09-08 DIAGNOSIS — E11.9 TYPE 2 DIABETES MELLITUS WITHOUT COMPLICATION, WITHOUT LONG-TERM CURRENT USE OF INSULIN (HCC): ICD-10-CM

## 2021-09-08 DIAGNOSIS — D68.59 HYPERCOAGULABLE STATE (HCC): Primary | ICD-10-CM

## 2021-09-08 DIAGNOSIS — I82.431 ACUTE DEEP VEIN THROMBOSIS (DVT) OF RIGHT POPLITEAL VEIN (HCC): ICD-10-CM

## 2021-09-08 DIAGNOSIS — I82.411 ACUTE DEEP VEIN THROMBOSIS (DVT) OF RIGHT FEMORAL VEIN (HCC): ICD-10-CM

## 2021-09-08 PROCEDURE — 99213 OFFICE O/P EST LOW 20 MIN: CPT | Performed by: FAMILY MEDICINE

## 2021-09-08 PROCEDURE — 90674 CCIIV4 VAC NO PRSV 0.5 ML IM: CPT | Performed by: FAMILY MEDICINE

## 2021-09-08 PROCEDURE — 90471 IMMUNIZATION ADMIN: CPT | Performed by: FAMILY MEDICINE

## 2021-09-08 NOTE — PROGRESS NOTES
Chief Complaint   Patient presents with    Follow-Up from [facility] DVT blood clot removal Friday        HPI:  Hina Ross II is a 45 y.o. male     ER follow up    Stopped eliquis about 6 months ago    Developed pain/swelling right calf    Went to ER    U/s positive for DVT     Has starter allan, back on eliquis    Already saw Vascular    Metformin for DM  Lab Results   Component Value Date    LABA1C 6.6 (H) 02/12/2021     No results found for: EAG        Basically just dealing with the discomfort      Wt Readings from Last 3 Encounters:   09/08/21 255 lb (115.7 kg)   08/31/21 250 lb (113.4 kg)   08/26/21 250 lb (113.4 kg)         Patient Active Problem List   Diagnosis    Vasovagal syncope    Diabetes mellitus ([facility] [address]    Sleep apnea    Acute deep vein thrombosis (DVT) of right femoral vein (HCC)    Acute deep vein thrombosis (DVT) of right popliteal vein (HCC)    Superficial thrombophlebitis of right leg    Post-thrombotic syndrome of right lower extremity    Aching leg syndrome of left lower extremity    Iliac vein stenosis, right       Current Outpatient Medications   Medication Sig Dispense Refill    acetaminophen (TYLENOL) 500 MG tablet Take 1,000 mg by mouth every 8 hours as needed      apixaban starter pack (ELIQUIS DVT/PE STARTER PACK) 5 MG TBPK tablet Take 1 tablet by mouth See Admin Instructions 74 tablet 0    celecoxib (CELEBREX) 200 MG capsule Take 1 capsule by mouth daily 30 capsule 5    CPAP Machine MISC by Does not apply route 1 each 0     No current facility-administered medications for this visit.          Past Medical History:   Diagnosis Date    Diabetes mellitus ([facility] [address]     Kidney stone     Pneumonia     Sleep apnea     Syncope and collapse 5/1/2018    Vasovagal syncope 5/1/2018     Past Surgical History:   Procedure Laterality Date    CATARACT REMOVAL WITH IMPLANT Bilateral 2015     Family History   Problem Relation Age of Onset    Other Mother         COPD    Diabetes Father      Social History     Socioeconomic History    Marital status:      Spouse name: None    Number of children: None    Years of education: None    Highest education level: None   Occupational History    None   Tobacco Use    Smoking status: Former Smoker     Packs/day: 0.25     Years: 30.00     Pack years: 7.50     Types: Cigars     Quit date: 2021     Years since quittin.0    Smokeless tobacco: Never Used   Vaping Use    Vaping Use: Never used   Substance and Sexual Activity    Alcohol use: No     Comment: social    Drug use: No    Sexual activity: None   Other Topics Concern    None   Social History Narrative    None     Social Determinants of Health     Financial Resource Strain: Low Risk     Difficulty of Paying Living Expenses: Not hard at all   Food Insecurity: No Food Insecurity    Worried About Running Out of Food in the Last Year: Never true    Iris of Food in the Last Year: Never true   Transportation Needs: No Transportation Needs    Lack of Transportation (Medical): No    Lack of Transportation (Non-Medical):  No   Physical Activity:     Days of Exercise per Week:     Minutes of Exercise per Session:    Stress:     Feeling of Stress :    Social Connections:     Frequency of Communication with Friends and Family:     Frequency of Social Gatherings with Friends and Family:     Attends [employer] Services:     Active Member of Clubs or Organizations:     Attends Club or Organization Meetings:     Marital Status:    Intimate Partner Violence:     Fear of Current or Ex-Partner:     Emotionally Abused:     Physically Abused:     Sexually Abused:      No Known Allergies    Review of Systems:   General ROS: fatigue  Respiratory ROS: no cough, shortness of breath, or wheezing  Cardiovascular ROS: no chest pain or dyspnea on exertion  Gastrointestinal ROS: no abdominal pain, change in bowel habits, or black or bloody stools  Genito-Urinary ROS: no dysuria, trouble voiding  Musculoskeletal ROS: back pain ongoing, hip pain  Neurological ROS: numbness in feet    In general patient otherwise reports feeling well. Physical Exam:  /70 (Site: Left Upper Arm)   Pulse 86   Temp 98.9 °F (37.2 °C)   Ht 6' (1.829 m)   Wt 255 lb (115.7 kg)   SpO2 95%   BMI 34.58 kg/m²     Gen: Well, NAD, Alert, Oriented x 3   HEENT: EOMI, eyes clear, MMM  Skin: multiple skin tags on neck  Neck: no significant lymphadenopathy or thyromegaly  Lungs: CTA B w/out Rales/Wheezes/Rhonchi, Good respiratory effort   Heart: RRR, S1S2, w/out M/R/G, non-displaced PMI    Ext: No C/C/E Bilaterally. Neuro: Neurovascularly intact w/ Sensory/Motor intact UE/LE Bilaterally. Foot exam done elswhere    Lab Results   Component Value Date    WBC 7.4 08/26/2021    HGB 16.5 08/26/2021    HCT 48.3 08/26/2021     08/26/2021    CHOL 189 10/12/2020    TRIG 234 (H) 10/12/2020    HDL 35 (L) 10/12/2020    ALT 21 08/26/2021    AST 18 08/26/2021     08/26/2021    K 4.2 08/26/2021     08/26/2021    CREATININE 1.00 08/26/2021    BUN 18 08/26/2021    CO2 27 08/26/2021    TSH 1.500 02/14/2019    INR 1.0 08/26/2021    LABA1C 6.6 (H) 02/12/2021    LABMICR <1.20 10/12/2020         A&P   Diagnosis Orders   1. Hypercoagulable state ([address]     2. Acute deep vein thrombosis (DVT) of right femoral vein (HCC)     3. Acute deep vein thrombosis (DVT) of right popliteal vein (HCC)     4.  Type 2 diabetes mellitus without complication, without long-term current use of insulin (HCC)         Will have possible thrombectomy, small angioplasty, possible stent placement with Dr. Moe Wilcox    Wt Readings from Last 3 Encounters:   09/08/21 255 lb (115.7 kg)   08/31/21 250 lb (113.4 kg)   08/26/21 250 lb (113.4 kg)         Guero Soliman MD

## 2021-09-08 NOTE — PROGRESS NOTES
Vaccine Information Sheet, \"Influenza - Inactivated\"  given to Genoveva Pierce, or parent/legal guardian of  Genoveva Pierce and verbalized understanding. Patient responses:    Have you ever had a reaction to a flu vaccine? No  Are you able to eat eggs without adverse effects? Yes  Do you have any current illness? No  Have you ever had Guillian [place] Syndrome? No    Flu vaccine given per order. Please see immunization tab. After obtaining consent, and per orders of Dr. Rashaad Neil , injection of flu given in Left arm by Rosemary King CMA ([facility]). Patient instructed to remain in clinic for 20 minutes afterwards, and to report any adverse reaction to me immediately.

## 2021-09-10 ENCOUNTER — HOSPITAL ENCOUNTER (OUTPATIENT)
Dept: INTERVENTIONAL RADIOLOGY/VASCULAR | Age: 51
Discharge: HOME | End: 2021-09-12
Attending: RADIOLOGY | Admitting: RADIOLOGY
Payer: COMMERCIAL

## 2021-09-10 VITALS
RESPIRATION RATE: 14 BRPM | HEART RATE: 66 BPM | DIASTOLIC BLOOD PRESSURE: 70 MMHG | SYSTOLIC BLOOD PRESSURE: 136 MMHG | OXYGEN SATURATION: 95 %

## 2021-09-10 DIAGNOSIS — I82.411 ACUTE DEEP VEIN THROMBOSIS (DVT) OF RIGHT FEMORAL VEIN (HCC): ICD-10-CM

## 2021-09-10 DIAGNOSIS — Z79.01 ANTICOAGULATED: ICD-10-CM

## 2021-09-10 DIAGNOSIS — I82.431 ACUTE DEEP VEIN THROMBOSIS (DVT) OF RIGHT POPLITEAL VEIN (HCC): ICD-10-CM

## 2021-09-10 PROCEDURE — 37252 INTRVASC US NONCORONARY 1ST: CPT | Performed by: RADIOLOGY

## 2021-09-10 PROCEDURE — 6360000002 HC RX W HCPCS: Performed by: RADIOLOGY

## 2021-09-10 PROCEDURE — C1769 GUIDE WIRE: HCPCS

## 2021-09-10 PROCEDURE — 2580000003 HC RX 258: Performed by: RADIOLOGY

## 2021-09-10 PROCEDURE — 37187 VENOUS MECH THROMBECTOMY: CPT | Performed by: RADIOLOGY

## 2021-09-10 PROCEDURE — 99152 MOD SED SAME PHYS/QHP 5/>YRS: CPT | Performed by: RADIOLOGY

## 2021-09-10 PROCEDURE — 2500000003 HC RX 250 WO HCPCS: Performed by: RADIOLOGY

## 2021-09-10 PROCEDURE — 36011 PLACE CATHETER IN VEIN: CPT | Performed by: RADIOLOGY

## 2021-09-10 PROCEDURE — 75820 VEIN X-RAY ARM/LEG: CPT | Performed by: RADIOLOGY

## 2021-09-10 PROCEDURE — 76937 US GUIDE VASCULAR ACCESS: CPT | Performed by: RADIOLOGY

## 2021-09-10 PROCEDURE — 6360000004 HC RX CONTRAST MEDICATION: Performed by: RADIOLOGY

## 2021-09-10 RX ORDER — IODIXANOL 320 MG/ML
INJECTION, SOLUTION INTRAVASCULAR
Status: COMPLETED | OUTPATIENT
Start: 2021-09-10 | End: 2021-09-10

## 2021-09-10 RX ORDER — HEPARIN SODIUM 5000 [USP'U]/ML
INJECTION, SOLUTION INTRAVENOUS; SUBCUTANEOUS
Status: COMPLETED | OUTPATIENT
Start: 2021-09-10 | End: 2021-09-10

## 2021-09-10 RX ORDER — MIDAZOLAM HYDROCHLORIDE 1 MG/ML
INJECTION INTRAMUSCULAR; INTRAVENOUS
Status: COMPLETED | OUTPATIENT
Start: 2021-09-10 | End: 2021-09-10

## 2021-09-10 RX ORDER — FENTANYL CITRATE 50 UG/ML
INJECTION, SOLUTION INTRAMUSCULAR; INTRAVENOUS
Status: COMPLETED | OUTPATIENT
Start: 2021-09-10 | End: 2021-09-10

## 2021-09-10 RX ORDER — 0.9 % SODIUM CHLORIDE 0.9 %
INTRAVENOUS SOLUTION INTRAVENOUS CONTINUOUS PRN
Status: COMPLETED | OUTPATIENT
Start: 2021-09-10 | End: 2021-09-10

## 2021-09-10 RX ORDER — LIDOCAINE HYDROCHLORIDE 20 MG/ML
INJECTION, SOLUTION INFILTRATION; PERINEURAL
Status: COMPLETED | OUTPATIENT
Start: 2021-09-10 | End: 2021-09-10

## 2021-09-10 RX ORDER — HEPARIN SODIUM 1000 [USP'U]/ML
INJECTION, SOLUTION INTRAVENOUS; SUBCUTANEOUS
Status: COMPLETED | OUTPATIENT
Start: 2021-09-10 | End: 2021-09-10

## 2021-09-10 RX ADMIN — SODIUM CHLORIDE 1000 ML: 9 INJECTION, SOLUTION INTRAVENOUS at 11:42

## 2021-09-10 RX ADMIN — MIDAZOLAM HYDROCHLORIDE 0.5 MG: 1 INJECTION INTRAMUSCULAR; INTRAVENOUS at 11:55

## 2021-09-10 RX ADMIN — IODIXANOL 74 ML: 320 INJECTION, SOLUTION INTRAVASCULAR at 13:02

## 2021-09-10 RX ADMIN — MIDAZOLAM HYDROCHLORIDE 0.5 MG: 1 INJECTION INTRAMUSCULAR; INTRAVENOUS at 12:00

## 2021-09-10 RX ADMIN — HEPARIN SODIUM 2000 UNITS: 5000 INJECTION INTRAVENOUS; SUBCUTANEOUS at 12:36

## 2021-09-10 RX ADMIN — LIDOCAINE HYDROCHLORIDE 12 ML: 20 INJECTION, SOLUTION INFILTRATION; PERINEURAL at 13:00

## 2021-09-10 RX ADMIN — SODIUM CHLORIDE 250 ML: 9 INJECTION, SOLUTION INTRAVENOUS at 11:43

## 2021-09-10 RX ADMIN — FENTANYL CITRATE 50 MCG: 50 INJECTION, SOLUTION INTRAMUSCULAR; INTRAVENOUS at 11:55

## 2021-09-10 RX ADMIN — FENTANYL CITRATE 50 MCG: 50 INJECTION, SOLUTION INTRAMUSCULAR; INTRAVENOUS at 12:01

## 2021-09-10 RX ADMIN — HEPARIN SODIUM 1000 UNITS: 5000 INJECTION INTRAVENOUS; SUBCUTANEOUS at 11:42

## 2021-09-10 RX ADMIN — HEPARIN SODIUM 3000 UNITS: 1000 INJECTION INTRAVENOUS; SUBCUTANEOUS at 12:18

## 2021-09-10 NOTE — FLOWSHEET NOTE
Received report form specials RN. Pt brought to CT HR via cart. Right posterior leg dressing dry and inaact, no bleeding noted. Pt denies any pain. VSS. Head of cart elevated, call light in reach. Pt sipping pop. Denies any nausea. 36 wife at cart side, updated regarding procedure. Aware pt is to stay 3 hours and may d/c to home at 1600.    1340 pt sitting up for lunch tray. No changes with assessment.

## 2021-09-10 NOTE — PROGRESS NOTES
Patient got dressed independently without difficulty. Right popliteal site stable, dressing clean dry and intact. Discharge instructions reviewed and signed. Patient verbalized understanding. Patient taken to wife's car via wheelchair.

## 2021-09-10 NOTE — SEDATION DOCUMENTATION
RIGHT LOWER EXTREMITY THROMBECTOMY      SEDATION ADMINISTERED     1130 Patient brought to the Cath lab room # 3 via cart. Monitors maintained. Nasal cannula with end tidal CO2 monitor applied. Assisted minimally to the table and into the prone position. Right AC peripheral IV connected to NS for medication administration and [facility]. Nabil Rutledge present, to evaluate plan of care with Dr. Dennis Gutierrez. 1140 Consent verified for right lower extremity thrombectomy. VSS at this time. Both lower legs cleaned generously with Chloroprep and patient draped using full body drape. Dennis Paris notified patient is ready on the table for procedure. 18 Dr. Dennis Gutierrez arrived and Patient's right leg evaluated with ultrasound imaging per Dr. Dennis Gutierrez. 1155 Timeout performed. Versed 0.5 mg IV and Fentanyl 50 mcg IV sedation administered. 1200 Dr. Dennis Gutierrez administered Lidocaine 2% to right leg popliteal area to numb the skin. 1205 Using Ultrasound guidance, a 5 fr MP Set (includes 21G x 7cm access needle, 0.018\" x 40cm nitinol wire guide, and 5F x 10cm introducer) inserted to gain and maintain access. 1206 A mixture of saline and contrast (Visipaque) was injected and fluoro images taken to visualize flow of contrast through the right leg.     1208 Glide Advantage 0.035\" x 260cm guidewire placed using fluoroscopy. 1210 9 Fr Capitan sheath placed over [address] guidewire. [address] 0.035\" digital catheter placed through sheath. Images obtained and evaluated by Dr Dennis Gutierrez. Patient's right leg evaluated with ultrasound imaging per Dr. Lorenzo Perez clot retriever system set up. Nabil 13F ClotTriever sheath and 16mm clotTriever thrombectomy catheter utilized. Patient offered verbal reassurance per sIsac Barfield and Lupe Bryan RN. 1218 Dr. Dennis Gutierrez gave verbal order to give patient 3000 units of IV heparin.     26 Clot retriever basket advanced past the clot under fluoro guidance and gently the clot retriever was pulled backwards to withdrawal clot. (Pass 1)  Reassurance offered as clots retrieved from the system. 1229  Verbal and tactile support offered per Nissa MOSLEY.     2532 Clot retriever device again inserted into right popliteal under fluoro guidance. Support offered   Gently Anari sytem pulled backwards. More clots removed. (Pass 2)      1231 Clot retrieval system again inserted under fluoro guidance. Support offered. Gently Anari sytem pulled backwards. More clots removed. (Pass 3)    1234 Clot retrieval system again inserted under fluoro guidance. Support offered. Gently Anari sytem pulled backwards. More clots removed. (Pass 4)    1236 Dr. Erika Ocampo gave verbal order to give 2000 units IV heparin. 53 Place [address] Dr. Erika Ocampo hand injecting contrast while  fluoro images were obtained. 1242 Clot retrieval system again inserted under fluoro guidance. Support offered. Gently Anari sytem pulled backwards. More clots removed. (Pass 5)    1243 Patient continues to tolerate procedure. Vitals remain stable. 1244 Clot retrieval system again inserted under fluoro guidance. Support offered. Gently Anari sytem pulled backwards. More clots removed. (Pass 6)    1246 Clot retrieval system again inserted under fluoro guidance. Support offered. Gently Anari sytem pulled backwards. More clots removed. (Pass 7 )    1248 Additional contrast was injected with fluoroscopy guidance and images taken. These images reviewed in detail by Dr. Erika Ocampo. 36 Sheath removed, Dr. Erika Ocmapo placed purse string suture. Gauze and tegaderm dressing applied. No bleeding noted. 1254 Procedure complete. Patient tolerated well, no ectopy noted on monitor. VSS. Patient denies complaints at this time. Plan of care: recovery for 3 hours. [phone] Patient assisted onto cart and taken to CT holding room for recovery. Dr Erika Ocampo to place post procedure orders.        NOTE: PLEASE INFORM PT OF NEED FOR PURSE STRING SUTURE TO BE REMOVED, NOTED IN D/C INSTRUCTIONS    Total Sedation Given:  Versed:     1 mg       Fentanyl:    100 mcg      Total Contrast used: 74 ml

## 2021-09-10 NOTE — PROGRESS NOTES
Patient ambulated to CT holding and changed into gown independently. Procedure explained and informed consent obtained. IV started, pt tolerated well, see flow sheet. Patient's history, allergy list, and home mediation list reviewed. Dr Jas Koenig notified pt ready to be seen in CT holding.

## 2021-09-13 ENCOUNTER — OFFICE VISIT (OUTPATIENT)
Dept: INTERVENTIONAL RADIOLOGY/VASCULAR | Age: 51
End: 2021-09-13
Payer: COMMERCIAL

## 2021-09-13 VITALS
DIASTOLIC BLOOD PRESSURE: 70 MMHG | BODY MASS INDEX: 34.58 KG/M2 | SYSTOLIC BLOOD PRESSURE: 136 MMHG | HEART RATE: 66 BPM | WEIGHT: 255 LBS

## 2021-09-13 DIAGNOSIS — M79.661 PAIN AND SWELLING OF LOWER LEG, RIGHT: ICD-10-CM

## 2021-09-13 DIAGNOSIS — Z79.01 ANTICOAGULATED: ICD-10-CM

## 2021-09-13 DIAGNOSIS — M79.89 PAIN AND SWELLING OF LOWER LEG, RIGHT: ICD-10-CM

## 2021-09-13 DIAGNOSIS — I82.411 ACUTE DEEP VEIN THROMBOSIS (DVT) OF RIGHT FEMORAL VEIN (HCC): Primary | ICD-10-CM

## 2021-09-13 DIAGNOSIS — I82.431 ACUTE DEEP VEIN THROMBOSIS (DVT) OF RIGHT POPLITEAL VEIN (HCC): ICD-10-CM

## 2021-09-13 PROCEDURE — 99214 OFFICE O/P EST MOD 30 MIN: CPT | Performed by: NURSE PRACTITIONER

## 2021-09-13 ASSESSMENT — ENCOUNTER SYMPTOMS
GASTROINTESTINAL NEGATIVE: 1
RESPIRATORY NEGATIVE: 1
EYES NEGATIVE: 1

## 2021-09-13 NOTE — PROGRESS NOTES
Thedereck Bee, a male of 45 y.o. came to the office 9/13/2021. Chief Complaint   Patient presents with    Leg Swelling     PROGRESS NOTE:     SUBJECTIVE:     9/13/2021: Angi Phillips II is here for venous procedure follow up S/P day 3 RLE total occlusive DVT venogram with thrombectomy with successful removal of total occlusive DVT from Right common femoral vein extending through popliteal vein. Here for removal of purse string suture to popliteal vein as level of fossa. He denies any post procedure complications. Reports mild right lower leg edema now without any pain. Intermittent mild right groin discomfort he developed just today, no swelling, no discoloration. Continues to wear daily during day thigh high compression sock which is managing well edema. Denies claudication. Denies chest pain. Denies shortness of breath. No S/sx bleed      8/31/2021 HPI: Angi Phillips II referred by [facility] for evaluation of RLE DVT. Patient presents with symptoms of: Right lower leg with pain and swelling onset x 3 weeks ago. Denies injury. Was on Eliquis from previous H/O RLE DVT but quit taking it x 6 months ago as it is too expensive. He was reinitiated back on Eliquis in ED with Lovenox injection. Since ED visit the RLE edema and pain has decreased but still remains. US done in ED shows complete occlusion from Right common femoral vein extending through popliteal vein. Denies LLE symptoms    PAST HISTORY:   H/O RLE DVT. Last seen in office back in May 2020 for this as well as superficial thrombus to Right SSV. RLE venogam with Successful DVT thrombectomy was done May 2020.   2020 RLE venogram with IVUS demonstrated possible stenosis verses transient dependent on position in right common iliac vein of approximately 50%. 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  Due to fact that following RLE DVT thrombectomy patient symptoms were completely resolved no intervention of venous stenting was performed. Has seen Dr. Sophia Hu in past and patient reports no evidence of clotting disorder. Leg elevation:  Daily with relief. Stocking use and dates: Wears daily class two compression stockings with moderate relief. Denies claudication. Denies chest pain. Denies shortness of breath. 7/7/2020:  August Cease II is here to discuss results of follow up abd. CT to evaluate for right iliac vein stenosis. S/P 6 week Rt LE DVT thrombectomy. Today he denies pain or swelling to RLE. States some mild tenderness to right posterior calf at catheter insertion site however this is improving daily since the procedure and is also almost resolved. He is very pleased with results of thrombectomy. He will experience some mild RLE below distal calf swelling and tenderness If he is up on his feet for long period of time. Continues to wear compression stocking to RLE. Compliant with [address] [place] [address] Road. PCP referred him to hematologist to evaluate for clotting disorder. He is to have follow up for this in August.      In office post operative follow up visit:   6/11/2010:   S/P two week right leg DVT thrombectomy. He continues to have RLE non pitting edema +1 knee distally to ankle area. He also continues to have right calf discomfort he's had since onset of DVT. He states that both edema and calf pain, although they still remain, are not as severe as prior to thrombectomy. He wears RLE thigh high compression stockings daily. Limits sodium intake. Continues to be active with working daily. Denies any worsening of RLE symptoms, shortness or breath, nausea, vomiting, fever. Has not yet gotten CT Pelvis to evaluate for any potential May Thurner Syndrome or Iliac vein compression. States he's otherwise been in good general health.         In office post operative follow up visit:   5/28/2020:   S/P day two right leg DVT thrombectomy.  He is here for post operative evaluation and suture removal. He states his symptoms including right lower leg swelling and calf pain still remain however they have and continue to improve and decrease since the procedure. He is wearing the compression stocking thigh high daily during day. He continues to take [facility] Eliquis. He denies any complications or complaints.            TELEHEALTH EVALUATION -- Audio/Visual (During [place] [id number] public health emergency)  2020 HPI:Maverick PARRIS Angy JACKSON (:  1970) has requested an audio/video evaluation for the following concern(s):  Referred by [facility] ED for evaluation of RLE acute DVT. Appears unprovoked. US yesterday in ED reports occlusive thrombus mid right femoral vein extending through popliteal vein. Superficial thrombus also noted in proximal Rt. SSV. Patent right CFV and proximal right FV. Right leg swelling mid thigh to feet onset x 1.5 weeks ago. Denies injury, long traveling. States no LE adverse symptoms prior to this. Pain to right lower leg he describes as muscle cramp he rates as 4/10. He states yesterday RLE was very red and today he states redness is minimal. He was initiated on Eliquis in ED yesterday along with being given a SQ Lovenox injection. Today he states redness and swelling have gone down some since yesterday but pain remains. Denies any H/O DVT or PE's. Denies any LE varicosities. Does not wear compression stockings. States he attempted to wear OTC compression socks a few years ago but quit wearing them as they cut into his leg causing pain. States he has left calf pain as well but this has been going on for several years. Denies left leg swelling. Denies bilateral LE fatigue or heaviness.    Family PCP Dr. Jill Arevalo follow up tomorrow for [facility] management.            Family History   Problem Relation Age of Onset    Other Mother         COPD    Diabetes Father        Past Surgical History:   Procedure Laterality Date    CATARACT REMOVAL WITH IMPLANT Bilateral         Past Medical History:   Diagnosis Date    Diabetes mellitus ([facility] [address]     Kidney stone     Pneumonia     Sleep apnea     Syncope and collapse 2018    Vasovagal syncope 2018       Social History     Socioeconomic History    Marital status:      Spouse name: Not on file    Number of children: Not on file    Years of education: Not on file    Highest education level: Not on file   Occupational History    Not on file   Tobacco Use    Smoking status: Former Smoker     Packs/day: 0.25     Years: 30.00     Pack years: 7.50     Types: Cigars     Quit date: 2021     Years since quittin.0    Smokeless tobacco: Never Used   Vaping Use    Vaping Use: Never used   Substance and Sexual Activity    Alcohol use: No     Comment: social    Drug use: No    Sexual activity: Not on file   Other Topics Concern    Not on file   Social History Narrative    Not on file     Social Determinants of Health     Financial Resource Strain: Low Risk     Difficulty of Paying Living Expenses: Not hard at all   Food Insecurity: No Food Insecurity    Worried About [address] [facility] in the Last Year: Never true    [address] in the Last Year: Never true   Transportation Needs: No Transportation Needs    Lack of Transportation (Medical): No    Lack of Transportation (Non-Medical):  No   Physical Activity:     Days of Exercise per Week:     Minutes of Exercise per Session:    Stress:     Feeling of Stress :    Social Connections:     Frequency of Communication with Friends and Family:     Frequency of Social Gatherings with Friends and Family:     Attends [employer] Services:     Active Member of Clubs or Organizations:     Attends Club or Organization Meetings:     Marital Status:    Intimate Partner Violence:     Fear of Current or Ex-Partner:     Emotionally Abused:     Physically Abused:     Sexually Abused:        No Known Allergies    Current Outpatient Medications on File Prior to Visit   Medication Sig Dispense Refill    acetaminophen (TYLENOL) 500 MG tablet Take 1,000 mg by mouth every 8 hours as needed      apixaban starter pack (ELIQUIS DVT/PE STARTER PACK) 5 MG TBPK tablet Take 1 tablet by mouth See Admin Instructions 74 tablet 0    celecoxib (CELEBREX) 200 MG capsule Take 1 capsule by mouth daily 30 capsule 5    CPAP Machine MISC by Does not apply route 1 each 0     No current facility-administered medications on file prior to visit. Review of Systems   Constitutional: Negative. HENT: Negative. Eyes: Negative. Respiratory: Negative. Cardiovascular: Positive for leg swelling (RIGHT LOWER LEG WITH MILD ACHING x 3 weeks ). Gastrointestinal: Negative. Endocrine: Negative. Genitourinary: Negative. Musculoskeletal: Negative. Skin: Negative. Neurological: Negative. Hematological: Negative. Psychiatric/Behavioral: Negative. OBJECTIVE:  /70   Pulse 66   Wt 255 lb (115.7 kg)   BMI 34.58 kg/m²     Physical Exam  Constitutional:       General: He is not in acute distress. Appearance: Normal appearance. He is not ill-appearing. HENT:      Head: Normocephalic and atraumatic. Nose: Nose normal. No congestion. Cardiovascular:      Rate and Rhythm: Normal rate. Pulses:           Dorsalis pedis pulses are 2+ on the right side. Posterior tibial pulses are 2+ on the right side. Heart sounds: Normal heart sounds. Comments: RLE warm to touch, no discoloration of cyanosis. Pulmonary:      Effort: Pulmonary effort is normal.   Abdominal:      Palpations: Abdomen is soft. Musculoskeletal:         General: No swelling, tenderness, deformity or signs of injury. Cervical back: Normal range of motion. Right lower leg: Edema (+1 non pitting below knee. ) present. Left lower leg: No edema. Skin:     General: Skin is warm and dry. Capillary Refill: Capillary refill takes 2 to 3 seconds. Findings: No bruising, erythema, lesion or rash. Neurological:      Mental Status: He is alert and oriented to person, place, and time. Psychiatric:         Mood and Affect: Mood normal.         Behavior: Behavior normal.       8/26/2021:   Narrative   VENOUS DUPLEX ULTRASOUND OF THE RIGHT LOWER EXTREMITY       CLINICAL HISTORY:  pain and right calf, hx dvt        COMPARISON:  NONE AVAILABLE        TECHNIQUE:  Sonographic imaging of the deep venous system of the right lower extremity was performed by a registered sonographer and the images were submitted for interpretation.         FINDINGS:  The right common femoral, superficial femoral, and popliteal veins demonstrate no color flow, augmentation, and compressibility. The study is positive for DVT in the right lower extremity.       The calf veins are not well seen.           Impression   This study is positive for DVT in the right lower extremity.             ASSESSMENT AND PLAN:  Chart, medications, lab work reviewed. Diagnosis Orders   1. Acute deep vein thrombosis (DVT) of right femoral vein (HCC)  US DUP LOWER EXTREMITY RIGHT MIKE    S/P day 3 RLE total occlusive DVT venogram with thrombectomy with successful removal of total occlusive DVT from Right common femoral vein extending through pop   2. Acute deep vein thrombosis (DVT) of right popliteal vein (HCC)  US DUP LOWER EXTREMITY RIGHT MIKE    S/P day 3 RLE total occlusive DVT venogram with thrombectomy with successful removal of total occlusive DVT from Right common femoral vein extending through pop   3. Anticoagulated  US DUP LOWER EXTREMITY RIGHT MIKE    Eliquis   4.  Pain and swelling of lower leg, right  US DUP LOWER EXTREMITY RIGHT MIKE    S/P day 3 RLE total occlusive DVT venogram with thrombectomy with successful removal of total occlusive DVT from Right common femoral vein extending through pop       Plan:     Orders Placed This Encounter   Procedures    US DUP LOWER EXTREMITY RIGHT MIKE Standing Status:   Future     Standing Expiration Date:   11/12/2021     Order Specific Question:   Reason for exam:     Answer:   evaluate RLE post thrombus dvt removal one month f/u. Dr. Jenny Saunders to read       --  Purse string suture to right popliteal fossa area removed in clinic without complications. Patient tolerated well. Band-Aid applied to site. --  One month follow up with RLE duplex to evaluate if any recurrence of DVT. --  Can return to work tomorrow  --  Continue to wear knee or thigh high 20-30 mmhg compression socks daily during day and off nightly, wear as tolerated. --  Instructed on importance of compliance with [employer] [address] and to follow up with PCP for possible change to another more affordable medication. --  Instructed on s/sx bleed from [employer] [address] including dark tarry stools, coffee ground emesis, uncontrolled epistaxis or gums bleeding and to notify general practitioner or go to ED ASAP   --  Go to ED ASAP with any acute shortness of breath and/or chest pain.      Devon Roach, APRN - CNP

## 2021-09-22 ENCOUNTER — TELEPHONE (OUTPATIENT)
Dept: FAMILY MEDICINE CLINIC | Age: 51
End: 2021-09-22

## 2021-09-22 NOTE — TELEPHONE ENCOUNTER
Pt is asking for a refill on the eliquis medication. Please send to [facility] phone number is [phone]. Pt has one week left.

## 2021-09-23 NOTE — TELEPHONE ENCOUNTER
Orders Placed This Encounter   Medications    apixaban (ELIQUIS) 5 MG TABS tablet     Sig: Take 1 tablet by mouth 2 times daily     Dispense:  180 tablet     Refill:  1       The above med(s) were e-scripted to the patient's pharmacy.    Please advise patient  Josiane Gibbons MD

## 2021-10-26 ENCOUNTER — OFFICE VISIT (OUTPATIENT)
Dept: INTERVENTIONAL RADIOLOGY/VASCULAR | Age: 51
End: 2021-10-26
Payer: COMMERCIAL

## 2021-10-26 VITALS
BODY MASS INDEX: 34.58 KG/M2 | WEIGHT: 255 LBS | HEART RATE: 66 BPM | DIASTOLIC BLOOD PRESSURE: 70 MMHG | SYSTOLIC BLOOD PRESSURE: 136 MMHG

## 2021-10-26 DIAGNOSIS — M79.661 PAIN AND SWELLING OF LOWER LEG, RIGHT: ICD-10-CM

## 2021-10-26 DIAGNOSIS — I82.531 CHRONIC DEEP VEIN THROMBOSIS (DVT) OF POPLITEAL VEIN OF RIGHT LOWER EXTREMITY (HCC): ICD-10-CM

## 2021-10-26 DIAGNOSIS — I82.431 ACUTE DEEP VEIN THROMBOSIS (DVT) OF RIGHT POPLITEAL VEIN (HCC): ICD-10-CM

## 2021-10-26 DIAGNOSIS — I82.511 CHRONIC DEEP VEIN THROMBOSIS (DVT) OF FEMORAL VEIN OF RIGHT LOWER EXTREMITY (HCC): ICD-10-CM

## 2021-10-26 DIAGNOSIS — I87.021: ICD-10-CM

## 2021-10-26 DIAGNOSIS — M79.89 PAIN AND SWELLING OF LOWER LEG, RIGHT: ICD-10-CM

## 2021-10-26 DIAGNOSIS — I82.411 ACUTE DEEP VEIN THROMBOSIS (DVT) OF RIGHT FEMORAL VEIN (HCC): Primary | ICD-10-CM

## 2021-10-26 DIAGNOSIS — Z79.01 ANTICOAGULATED: ICD-10-CM

## 2021-10-26 PROCEDURE — 99214 OFFICE O/P EST MOD 30 MIN: CPT | Performed by: NURSE PRACTITIONER

## 2021-10-26 ASSESSMENT — ENCOUNTER SYMPTOMS
GASTROINTESTINAL NEGATIVE: 1
EYES NEGATIVE: 1
RESPIRATORY NEGATIVE: 1

## 2021-10-26 NOTE — PROGRESS NOTES
Kemar Bob II, a male of 45 y.o. came to the office 10/26/2021. Chief Complaint   Patient presents with    Results     us results     PROGRESS NOTE:     SUBJECTIVE:     10/26/2021: Indy Tse returns for one month RLE US duplex follow up S/P RLE DVT thrombectomy with successful recanalization of femoral and popliteal vein. Follow up [address] shows left femoral vein remains patent. Patient has duplicated mid femoral vein. There remains chronic thrombus to duplicated system at mid and distal femoral vein. He reports LE symptoms have improved with decrease in symptoms. Right leg edema is almost completelty resolved however occurs mildly in lower leg after prolonged standing. Left medial thigh with throbbing onset x 2 days occurs after prolonged standing. Denies any edema, discoloration, no induration, no noted ischemia. No S/sx bleed  Continues with [facility] Eliquis with no S/sx bleed  Continues to wear daily during day thigh high compression sock which is effectively managing edema. Denies claudication. Denies chest pain. Denies shortness of breath. Right groin pain reported at last OV resolved. 9/13/2021: Kemar Bob II is here for venous procedure follow up S/P day 3 RLE total occlusive DVT venogram with thrombectomy with successful removal of total occlusive DVT from Right common femoral vein extending through popliteal vein. Here for removal of purse string suture to popliteal vein as level of fossa. He denies any post procedure complications. Reports mild right lower leg edema now without any pain. Intermittent mild right groin discomfort he developed just today, no swelling, no discoloration. Continues to wear daily during day thigh high compression sock which is managing well edema. Denies claudication. Denies chest pain. Denies shortness of breath. No S/sx bleed    8/31/2021 HPI: Kemar Bob II referred by [facility] ED for evaluation of RLE DVT.   Patient presents with symptoms of: Right lower leg with pain and swelling onset x 3 weeks ago. Denies injury. Was on Eliquis from previous H/O RLE DVT but quit taking it x 6 months ago as it is too expensive. He was reinitiated back on Eliquis in ED with Lovenox injection. Since ED visit the RLE edema and pain has decreased but still remains. US done in ED shows complete occlusion from Right common femoral vein extending through popliteal vein. Denies LLE symptoms    PAST HISTORY:   H/O RLE DVT. Last seen in office back in May 2020 for this as well as superficial thrombus to Right SSV. RLE venogam with Successful DVT thrombectomy was done May 2020.   2020 RLE venogram with IVUS demonstrated possible stenosis verses transient dependent on position in right common iliac vein of approximately 50%. 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 Due to fact that following RLE DVT thrombectomy patient symptoms were completely resolved no intervention of venous stenting was performed. Has seen Dr. Jey Aguilar in past and patient reports no evidence of clotting disorder. Leg elevation:  Daily with relief. Stocking use and dates: Wears daily class two compression stockings with moderate relief. Denies claudication. Denies chest pain. Denies shortness of breath. 7/7/2020:  Sarah Lim II is here to discuss results of follow up abd. CT to evaluate for right iliac vein stenosis. S/P 6 week Rt LE DVT thrombectomy. Today he denies pain or swelling to RLE. States some mild tenderness to right posterior calf at catheter insertion site however this is improving daily since the procedure and is also almost resolved. He is very pleased with results of thrombectomy. He will experience some mild RLE below distal calf swelling and tenderness If he is up on his feet for long period of time. Continues to wear compression stocking to RLE. Compliant with [address].    PCP referred him to hematologist to evaluate for clotting disorder. He is to have follow up for this in August.      In office post operative follow up visit:   2010:   S/P two week right leg DVT thrombectomy. He continues to have RLE non pitting edema +1 knee distally to ankle area. He also continues to have right calf discomfort he's had since onset of DVT. He states that both edema and calf pain, although they still remain, are not as severe as prior to thrombectomy. He wears RLE thigh high compression stockings daily. Limits sodium intake. Continues to be active with working daily. Denies any worsening of RLE symptoms, shortness or breath, nausea, vomiting, fever. Has not yet gotten CT Pelvis to evaluate for any potential May Thurner Syndrome or Iliac vein compression. States he's otherwise been in good general health.         In office post operative follow up visit:   2020:   S/P day two right leg DVT thrombectomy. He is here for post operative evaluation and suture removal. He states his symptoms including right lower leg swelling and calf pain still remain however they have and continue to improve and decrease since the procedure. He is wearing the compression stocking thigh high daily during day. He continues to take [address] Eliquis. He denies any complications or complaints.            TELEHEALTH EVALUATION -- Audio/Visual (During BTXEC-82 public health emergency)  2020 HPI:Maverick Travis II (:  1970) has requested an audio/video evaluation for the following concern(s):  Referred by [facility] ED for evaluation of RLE acute DVT. Appears unprovoked. US yesterday in ED reports occlusive thrombus mid right femoral vein extending through popliteal vein. Superficial thrombus also noted in proximal Rt. SSV. Patent right CFV and proximal right FV. Right leg swelling mid thigh to feet onset x 1.5 weeks ago. Denies injury, long traveling. States no LE adverse symptoms prior to this.    Pain to right lower leg he describes as muscle cramp he rates as 4/10. He states yesterday RLE was very red and today he states redness is minimal. He was initiated on Eliquis in ED yesterday along with being given a SQ Lovenox injection. Today he states redness and swelling have gone down some since yesterday but pain remains. Denies any H/O DVT or PE's. Denies any LE varicosities. Does not wear compression stockings. States he attempted to wear OTC compression socks a few years ago but quit wearing them as they cut into his leg causing pain. States he has left calf pain as well but this has been going on for several years. Denies left leg swelling. Denies bilateral LE fatigue or heaviness.    Family PCP Dr. Saint Manna follow up tomorrow for [facility] management.            Family History   Problem Relation Age of Onset    Other Mother         COPD    Diabetes Father        Past Surgical History:   Procedure Laterality Date    CATARACT REMOVAL WITH IMPLANT Bilateral     IR THROMB [address]  9/10/2021    IR THROMB [address] 9/10/2021 [facility] PROCEDURE        Past Medical History:   Diagnosis Date    Diabetes mellitus ([address]     Kidney stone     Pneumonia     Sleep apnea     Syncope and collapse 2018    Vasovagal syncope 2018       Social History     Socioeconomic History    Marital status:      Spouse name: Not on file    Number of children: Not on file    Years of education: Not on file    Highest education level: Not on file   Occupational History    Not on file   Tobacco Use    Smoking status: Former Smoker     Packs/day: 0.25     Years: 30.00     Pack years: 7.50     Types: Cigars     Quit date: 2021     Years since quittin.1    Smokeless tobacco: Never Used   Vaping Use    Vaping Use: Never used   Substance and Sexual Activity    Alcohol use: No     Comment: social    Drug use: No    Sexual activity: Not on file   Other Topics Concern    Not on file   Social History Narrative    Not on file     Social Determinants of Health     Financial Resource Strain:     Difficulty of Paying Living Expenses:    Food Insecurity:     Worried About Running Out of Food in the Last Year:     [address] in the Last Year:    Transportation Needs:     Lack of Transportation (Medical):  Lack of Transportation (Non-Medical):    Physical Activity:     Days of Exercise per Week:     Minutes of Exercise per Session:    Stress:     Feeling of Stress :    Social Connections:     Frequency of Communication with Friends and Family:     Frequency of Social Gatherings with Friends and Family:     Attends [employer] Services:     Active Member of Clubs or Organizations:     Attends Club or Organization Meetings:     Marital Status:    Intimate Partner Violence:     Fear of Current or Ex-Partner:     Emotionally Abused:     Physically Abused:     Sexually Abused:        No Known Allergies    Current Outpatient Medications on File Prior to Visit   Medication Sig Dispense Refill    apixaban (ELIQUIS) 5 MG TABS tablet Take 1 tablet by mouth 2 times daily 180 tablet 1    acetaminophen (TYLENOL) 500 MG tablet Take 1,000 mg by mouth every 8 hours as needed      apixaban starter pack (ELIQUIS DVT/PE STARTER PACK) 5 MG TBPK tablet Take 1 tablet by mouth See Admin Instructions 74 tablet 0    celecoxib (CELEBREX) 200 MG capsule Take 1 capsule by mouth daily 30 capsule 5    CPAP Machine MISC by Does not apply route 1 each 0     No current facility-administered medications on file prior to visit. Review of Systems   Constitutional: Negative. HENT: Negative. Eyes: Negative. Respiratory: Negative. Cardiovascular: Positive for leg swelling (RIGHT LOWER LEG WITH MILD ACHING x 3 weeks ). Gastrointestinal: Negative. Endocrine: Negative. Genitourinary: Negative. Musculoskeletal: Negative. Skin: Negative. Neurological: Negative. Hematological: Negative.     Psychiatric/Behavioral: Negative. OBJECTIVE:  /70   Pulse 66   Wt 255 lb (115.7 kg)   BMI 34.58 kg/m²     Physical Exam  Constitutional:       General: He is not in acute distress. Appearance: Normal appearance. He is not ill-appearing. HENT:      Head: Normocephalic and atraumatic. Nose: Nose normal. No congestion. Cardiovascular:      Rate and Rhythm: Normal rate. Pulses:           Dorsalis pedis pulses are 2+ on the right side. Posterior tibial pulses are 2+ on the right side. Heart sounds: Normal heart sounds. Comments: RLE warm to touch, no discoloration of cyanosis. Pulmonary:      Effort: Pulmonary effort is normal.   Abdominal:      Palpations: Abdomen is soft. Musculoskeletal:         General: No swelling, tenderness, deformity or signs of injury. Cervical back: Normal range of motion. Right lower leg: Edema (+1 non pitting below knee. ) present. Left lower leg: No edema. Skin:     General: Skin is warm and dry. Capillary Refill: Capillary refill takes 2 to 3 seconds. Findings: No bruising, erythema, lesion or rash. Neurological:      Mental Status: He is alert and oriented to person, place, and time. Psychiatric:         Mood and Affect: Mood normal.         Behavior: Behavior normal.       8/26/2021:   Narrative   VENOUS DUPLEX ULTRASOUND OF THE RIGHT LOWER EXTREMITY       CLINICAL HISTORY:  pain and right calf, hx dvt        COMPARISON:  NONE AVAILABLE        TECHNIQUE:  Sonographic imaging of the deep venous system of the right lower extremity was performed by a registered sonographer and the images were submitted for interpretation.         FINDINGS:  The right common femoral, superficial femoral, and popliteal veins demonstrate no color flow, augmentation, and compressibility.  The study is positive for DVT in the right lower extremity.       The calf veins are not well seen.           Impression   This study is positive for DVT in the right lower extremity.           9/10/2021  Impression   Impression: Successful mechanical thrombectomy of a DVT involving the entire left femoral vein and popliteal vein. The vein is now recanalized and widely patent with normal blood flow.       Procedures:   1. Ultrasound evaluation of the right popliteal vein. There was extensive occlusive thrombus at that location. Ultrasound was used to access the right popliteal vein. Ultrasound images of the blood vessel are saved to PACS. 2. IV conscious sedation with Fentanyl and Versed. Total IV sedation time was 60 minutes. 3. Venogram of the right popliteal vein with the catheter in the right popliteal vein. 4. Venogram of the right femoral vein with the catheter in the right popliteal vein. 5. Venogram of the right iliac vein with the catheter in the right popliteal vein. 6. Mechanical thrombectomy of the right femoral vein, and popliteal vein. 7. Intravascular ultrasound of the inferior vena cava, common iliac vein, external iliac vein, common femoral vein, and femoral vein. Ultrasound images are saved to PACS.       Radiation dose to the patient was: 95.23 mGy       Following the discussion of the procedure, alternatives, risks versus benefits, informed consent was obtained from the patient.  Specifically, risks of damage to the blood vessels, hemorrhage, infection, injury to the adjacent organs such as stroke,    heart attack, or pulmonary embolus were discussed and the patient verbalized understanding.       Pre-procedure evaluation confirmed that the patient was an appropriate candidate for conscious    sedation. Adequate sedation was maintained during the entire procedure.  Vital signs, pulse    oximetry, and response to verbal commands were monitored and recorded by the nurse throughout the    procedure and the recovery period.  The flow sheet was placed in the medical record including the    medications and dosages used.  The patient returned to baseline neurologic and physiologic status    prior to leaving the department. No immediate sedation related complications were noted. Medication    for conscious sedation was administered via IV route. 60 minutes of conscious sedation was    provided.       Following universal protocol, patient and site verification was performed with a \"timeout\" prior to the procedure.        The patient was placed prone on the angiography table. The bilateral popliteal fossa were prepped and draped in sterile fashion. Ultrasound was used to study the right popliteal vein, the vein demonstrated a large occlusive thrombus. This area was chosen    for access. The area was anesthetized with lidocaine. A 21-gauge micropuncture needle was advanced into the vein under ultrasound guidance. Ultrasound images are saved to PACS. A microwire was advanced and the needle was replaced with a transition    sheath. Through the transition sheath a venogram was performed which demonstrated partial occlusion of the popliteal vein, and complete occlusion of the femoral vein where contrast did not traverse past that point. A true core guidewire was advanced    through the transition sheath through the thrombus, through the inferior vena cava and into the superior vena cava. A 9 Congolese sheath was placed. Venogram was performed by injection through the 9 Congolese sheath. Venogram demonstrated complete occlusion of    the popliteal vein and femoral vein with very tiny amount of contrast passing through and around the thrombus very slowly. Following that a volcano intravascular ultrasound catheter was advanced and intravascular ultrasound of the inferior vena cava,    iliac veins, femoral vein and popliteal vein was performed. Intravascular ultrasound images were saved to PACS.  Intravascular ultrasound demonstrated a widely patent inferior vena cava and widely patent right common iliac and external iliac veins as well    as a patent right common femoral vein. A large thrombus was seen over the intravascular ultrasound to occupy the entire right femoral vein and popliteal vein. The 9 Pashto sheath was removed and a ClotTreiver sheath was then advanced. Over the wire the    ClotTreiver device was advanced into the common iliac vein and the basket was deployed and thrombectomy was performed by pulling the basket back out. This demonstrated extensive thrombus. This process was repeated 7 more times, every time demonstrating    large amounts of mostly subacute to chronic thrombus component. Repeat venogram through the sheath demonstrated a now widely patent popliteal and femoral vein with contrast flowing freely through the right leg into the inferior vena cava without any    persistent stenosis. The sheath was removed and a pursestring suture was used to close the right popliteal access. Sterile dressings were applied. Patient tolerated the procedure well without any immediate competitions. Patient was transferred back to    recovery room in normal and stable condition.         10/14/2021:  Impression   1. Again seen is a chronic nonocclusive thrombus in the proximal right femoral vein and mid to distal femoral vein. The distal right femoral vein is duplicated and patent. The common femoral vein, popliteal vein, and calf veins are patent. This is grossly    unchanged.           One month RLE US duplex follow up S/P RLE DVT thrombectomy with successful recanalization of femoral and popliteal vein. Follow up [address] [facility] shows left femoral and popliteal veins remains patent. Patient has duplicated left mid femoral vein. There remains chronic thrombus to duplicated system at mid and distal femoral vein. ASSESSMENT AND PLAN:  Chart, medications, lab work reviewed. RLE venogram and thrombectomy results discussed with patient today. Follow-up RLE venous duplex results reviewed with patient today. Diagnosis Orders   1.  Acute deep vein thrombosis (DVT) of right femoral vein ([facility]     2. Acute deep vein thrombosis (DVT) of right popliteal vein (HCC)     3. Anticoagulated     4. Pain and swelling of lower leg, right     5. Chronic deep vein thrombosis (DVT) of femoral vein of right lower extremity (HCC)     6. Chronic deep vein thrombosis (DVT) of popliteal vein of right lower extremity (HCC)     7. 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 --  Continue to wear knee or thigh high 20-30 mmhg compression socks daily during day and off nightly, wear as tolerated. --  Instructed on importance of compliance with [facility] and to follow up with PCP for possible change to another more affordable medication. --  Instructed on s/sx bleed from [facility] including dark tarry stools, coffee ground emesis, uncontrolled epistaxis or gums bleeding and to notify general practitioner or go to ED ASAP   --  No further follow-up care required in vascular clinic.         Rayne Nielsen, ZOE - CNP

## 2022-02-09 NOTE — TELEPHONE ENCOUNTER
Pt of Dr. Odin Tejada Pt has been out of the eliquis medication for five days. Needs a coupon card,  Last refill  Next appt 3/10/22  Lov 9/8/21        .

## 2022-02-11 ENCOUNTER — TELEPHONE (OUTPATIENT)
Dept: FAMILY MEDICINE CLINIC | Age: 52
End: 2022-02-11

## 2022-02-11 NOTE — TELEPHONE ENCOUNTER
Orders Placed This Encounter   Medications    rivaroxaban (XARELTO) 20 MG TABS tablet     Sig: Take 1 tablet by mouth daily (with breakfast)     Dispense:  30 tablet     Refill:  1       The above med(s) were e-scripted to the patient's pharmacy.    Please advise patient  Alanna Alva MD

## 2022-03-10 ENCOUNTER — OFFICE VISIT (OUTPATIENT)
Dept: FAMILY MEDICINE CLINIC | Age: 52
End: 2022-03-10
Payer: COMMERCIAL

## 2022-03-10 VITALS
DIASTOLIC BLOOD PRESSURE: 80 MMHG | HEIGHT: 72 IN | HEART RATE: 79 BPM | BODY MASS INDEX: 37.08 KG/M2 | OXYGEN SATURATION: 96 % | TEMPERATURE: 98.6 F | WEIGHT: 273.8 LBS | SYSTOLIC BLOOD PRESSURE: 134 MMHG

## 2022-03-10 DIAGNOSIS — D68.59 HYPERCOAGULABLE STATE (HCC): Primary | ICD-10-CM

## 2022-03-10 DIAGNOSIS — R79.89 LOW TESTOSTERONE IN MALE: ICD-10-CM

## 2022-03-10 DIAGNOSIS — Z12.5 SCREENING PSA (PROSTATE SPECIFIC ANTIGEN): ICD-10-CM

## 2022-03-10 DIAGNOSIS — E11.9 TYPE 2 DIABETES MELLITUS WITHOUT COMPLICATION, WITHOUT LONG-TERM CURRENT USE OF INSULIN (HCC): ICD-10-CM

## 2022-03-10 DIAGNOSIS — G47.30 SLEEP APNEA, UNSPECIFIED TYPE: ICD-10-CM

## 2022-03-10 PROCEDURE — 99214 OFFICE O/P EST MOD 30 MIN: CPT | Performed by: FAMILY MEDICINE

## 2022-03-10 SDOH — ECONOMIC STABILITY: FOOD INSECURITY: WITHIN THE PAST 12 MONTHS, THE FOOD YOU BOUGHT JUST DIDN'T LAST AND YOU DIDN'T HAVE MONEY TO GET MORE.: NEVER TRUE

## 2022-03-10 SDOH — ECONOMIC STABILITY: FOOD INSECURITY: WITHIN THE PAST 12 MONTHS, YOU WORRIED THAT YOUR FOOD WOULD RUN OUT BEFORE YOU GOT MONEY TO BUY MORE.: NEVER TRUE

## 2022-03-10 ASSESSMENT — PATIENT HEALTH QUESTIONNAIRE - PHQ9
SUM OF ALL RESPONSES TO PHQ QUESTIONS 1-9: 0
SUM OF ALL RESPONSES TO PHQ9 QUESTIONS 1 & 2: 0
1. LITTLE INTEREST OR PLEASURE IN DOING THINGS: 0
2. FEELING DOWN, DEPRESSED OR HOPELESS: 0
SUM OF ALL RESPONSES TO PHQ QUESTIONS 1-9: 0

## 2022-03-10 ASSESSMENT — SOCIAL DETERMINANTS OF HEALTH (SDOH): HOW HARD IS IT FOR YOU TO PAY FOR THE VERY BASICS LIKE FOOD, HOUSING, MEDICAL CARE, AND HEATING?: NOT HARD AT ALL

## 2022-03-10 NOTE — PROGRESS NOTES
Chief Complaint   Patient presents with    Discuss Medications       HPI:  Leon Rosa is a 45 y.o. male     Follow up    Insurance changed  Had to switch to xarelto from eliquis     Metformin for DM  Lab Results   Component Value Date    LABA1C 6.6 (H) 2021     No results found for: EAG    Ongoing hip pain    Basically just dealing with the discomfort      Wt Readings from Last 3 Encounters:   03/10/22 273 lb 12.8 oz (124.2 kg)   10/26/21 255 lb (115.7 kg)   21 255 lb (115.7 kg)         Patient Active Problem List   Diagnosis    Vasovagal syncope    Diabetes mellitus ([facility]    Sleep apnea    Acute deep vein thrombosis (DVT) of right femoral vein (HCC)    Acute deep vein thrombosis (DVT) of right popliteal vein (HCC)    Superficial thrombophlebitis of right leg    Post-thrombotic syndrome of right lower extremity    Aching leg syndrome of left lower extremity    Iliac vein stenosis, right       Current Outpatient Medications   Medication Sig Dispense Refill    rivaroxaban (XARELTO) 20 MG TABS tablet Take 1 tablet by mouth daily (with breakfast) 30 tablet 5     No current facility-administered medications for this visit.          Past Medical History:   Diagnosis Date    Diabetes mellitus ([facility]     Kidney stone     Pneumonia     Sleep apnea     Syncope and collapse 2018    Vasovagal syncope 2018     Past Surgical History:   Procedure Laterality Date    CATARACT REMOVAL WITH IMPLANT Bilateral     [facility]  9/10/2021    [facility] 9/10/2021 [facility]EDURE     Family History   Problem Relation Age of Onset    Other Mother         COPD    Diabetes Father      Social History     Socioeconomic History    Marital status:      Spouse name: None    Number of children: None    Years of education: None    Highest education level: None   Occupational History    None   Tobacco Use    Smoking status: Former Smoker     Packs/day: 0.25 stools  Genito-Urinary ROS: no dysuria, trouble voiding  Musculoskeletal ROS: back pain ongoing, hip pain  Neurological ROS: numbness in feet    In general patient otherwise reports feeling well. Physical Exam:  /80 (Site: Left Upper Arm)   Pulse 79   Temp 98.6 °F (37 °C)   Ht 6' (1.829 m)   Wt 273 lb 12.8 oz (124.2 kg)   SpO2 96%   BMI 37.13 kg/m²     Gen: Well, NAD, Alert, Oriented x 3   HEENT: EOMI, eyes clear, MMM  Skin: multiple skin tags on neck  Neck: no significant lymphadenopathy or thyromegaly  Lungs: CTA B w/out Rales/Wheezes/Rhonchi, Good respiratory effort   Heart: RRR, S1S2, w/out M/R/G, non-displaced PMI    Ext: No C/C/E Bilaterally. Neuro: Neurovascularly intact w/ Sensory/Motor intact UE/LE Bilaterally. Foot exam done elswhere    Lab Results   Component Value Date    WBC 7.4 08/26/2021    HGB 16.5 08/26/2021    HCT 48.3 08/26/2021     08/26/2021    CHOL 189 10/12/2020    TRIG 234 (H) 10/12/2020    HDL 35 (L) 10/12/2020    ALT 21 08/26/2021    AST 18 08/26/2021     08/26/2021    K 4.2 08/26/2021     08/26/2021    CREATININE 1.00 08/26/2021    BUN 18 08/26/2021    CO2 27 08/26/2021    TSH 1.500 02/14/2019    INR 1.0 08/26/2021    LABA1C 6.6 (H) 02/12/2021    LABMICR <1.20 10/12/2020         A&P   Diagnosis Orders   1. Hypercoagulable state ([address]  rivaroxaban (XARELTO) 20 MG TABS tablet   2. Type 2 diabetes mellitus without complication, without long-term current use of insulin (HCC)  Hemoglobin A1C    Microalbumin / Creatinine Urine Ratio    Lipid Panel    CBC    Comprehensive Metabolic Panel   3. Sleep apnea, unspecified type     4. Low testosterone in male     5. Screening PSA (prostate specific antigen)  PSA Screening       Compliant with CPAP, benefits from use    Labs as ordered    Doing well on xarelto now     Wt Readings from Last 3 Encounters:   03/10/22 273 lb 12.8 oz (124.2 kg)   10/26/21 255 lb (115.7 kg)   09/13/21 255 lb (115.7 kg)         Wilton Brizuela. Beryle Situ, MD

## 2022-03-16 DIAGNOSIS — Z12.5 SCREENING PSA (PROSTATE SPECIFIC ANTIGEN): ICD-10-CM

## 2022-03-16 DIAGNOSIS — E11.9 TYPE 2 DIABETES MELLITUS WITHOUT COMPLICATION, WITHOUT LONG-TERM CURRENT USE OF INSULIN (HCC): ICD-10-CM

## 2022-03-16 LAB
ALBUMIN SERPL-MCNC: 4 G/DL (ref 3.5–4.6)
ALP BLD-CCNC: 75 U/L (ref 35–104)
ALT SERPL-CCNC: 19 U/L (ref 0–41)
ANION GAP SERPL CALCULATED.3IONS-SCNC: 10 MEQ/L (ref 9–15)
AST SERPL-CCNC: 14 U/L (ref 0–40)
BILIRUB SERPL-MCNC: <0.2 MG/DL (ref 0.2–0.7)
BUN BLDV-MCNC: 15 MG/DL (ref 6–20)
CALCIUM SERPL-MCNC: 9.1 MG/DL (ref 8.5–9.9)
CHLORIDE BLD-SCNC: 102 MEQ/L (ref 95–107)
CHOLESTEROL, TOTAL: 215 MG/DL (ref 0–199)
CO2: 28 MEQ/L (ref 20–31)
CREAT SERPL-MCNC: 0.96 MG/DL (ref 0.7–1.2)
CREATININE URINE: 173.3 MG/DL
GFR AFRICAN AMERICAN: >60
GFR NON-AFRICAN AMERICAN: >60
GLOBULIN: 3 G/DL (ref 2.3–3.5)
GLUCOSE BLD-MCNC: 148 MG/DL (ref 70–99)
HBA1C MFR BLD: 6.9 % (ref 4.8–5.9)
HCT VFR BLD CALC: 51.3 % (ref 42–52)
HDLC SERPL-MCNC: 28 MG/DL (ref 40–59)
HEMOGLOBIN: 16.9 G/DL (ref 14–18)
LDL CHOLESTEROL CALCULATED: 117 MG/DL (ref 0–129)
MCH RBC QN AUTO: 33.3 PG (ref 27–31.3)
MCHC RBC AUTO-ENTMCNC: 33 % (ref 33–37)
MCV RBC AUTO: 101 FL (ref 80–100)
MICROALBUMIN UR-MCNC: <1.2 MG/DL
MICROALBUMIN/CREAT UR-RTO: NORMAL MG/G (ref 0–30)
PDW BLD-RTO: 13.9 % (ref 11.5–14.5)
PLATELET # BLD: 167 K/UL (ref 130–400)
POTASSIUM SERPL-SCNC: 4.5 MEQ/L (ref 3.4–4.9)
PROSTATE SPECIFIC ANTIGEN: 0.5 NG/ML (ref 0–4)
RBC # BLD: 5.08 M/UL (ref 4.7–6.1)
SODIUM BLD-SCNC: 140 MEQ/L (ref 135–144)
TOTAL PROTEIN: 7 G/DL (ref 6.3–8)
TRIGL SERPL-MCNC: 349 MG/DL (ref 0–150)
WBC # BLD: 9.5 K/UL (ref 4.8–10.8)

## 2022-03-30 ENCOUNTER — TELEPHONE (OUTPATIENT)
Dept: FAMILY MEDICINE CLINIC | Age: 52
End: 2022-03-30

## 2022-03-30 NOTE — TELEPHONE ENCOUNTER
Patient called back regarding labs. Patient aware of results from 3/16/22  States he was fasting when completing the blood work.

## 2022-06-27 ENCOUNTER — TELEPHONE (OUTPATIENT)
Dept: FAMILY MEDICINE CLINIC | Age: 52
End: 2022-06-27

## 2022-06-27 DIAGNOSIS — Z86.718 HISTORY OF DEEP VEIN THROMBOSIS (DVT) OF LOWER EXTREMITY: Primary | ICD-10-CM

## 2022-06-27 NOTE — TELEPHONE ENCOUNTER
I called to schedule pt needs referral to be seen Bigg Mcpherson she is out this week. They asked if you could order a US Duplex .  If something comes back abnormal they will  Figure out how to get him in sooner        Appt 7/7 at 2 pm

## 2022-06-28 ENCOUNTER — HOSPITAL ENCOUNTER (OUTPATIENT)
Dept: ULTRASOUND IMAGING | Age: 52
Discharge: HOME | End: 2022-06-30
Payer: COMMERCIAL

## 2022-06-28 DIAGNOSIS — Z86.718 HISTORY OF DEEP VEIN THROMBOSIS (DVT) OF LOWER EXTREMITY: ICD-10-CM

## 2022-06-28 PROCEDURE — 93971 EXTREMITY STUDY: CPT

## 2022-07-07 ENCOUNTER — INITIAL CONSULT (OUTPATIENT)
Dept: INTERVENTIONAL RADIOLOGY/VASCULAR | Age: 52
End: 2022-07-07
Payer: COMMERCIAL

## 2022-07-07 VITALS
WEIGHT: 280 LBS | SYSTOLIC BLOOD PRESSURE: 162 MMHG | BODY MASS INDEX: 37.97 KG/M2 | DIASTOLIC BLOOD PRESSURE: 82 MMHG | HEART RATE: 85 BPM

## 2022-07-07 DIAGNOSIS — F17.200 SMOKER: ICD-10-CM

## 2022-07-07 DIAGNOSIS — Z86.718 HISTORY OF DEEP VEIN THROMBOSIS (DVT) OF LOWER EXTREMITY: ICD-10-CM

## 2022-07-07 DIAGNOSIS — R20.0 NUMBNESS OF TOES: Primary | ICD-10-CM

## 2022-07-07 DIAGNOSIS — I87.001 POST-THROMBOTIC SYNDROME OF RIGHT LOWER EXTREMITY: ICD-10-CM

## 2022-07-07 DIAGNOSIS — M79.89 PAIN AND SWELLING OF LOWER LEG, RIGHT: ICD-10-CM

## 2022-07-07 DIAGNOSIS — M79.661 PAIN AND SWELLING OF LOWER LEG, RIGHT: ICD-10-CM

## 2022-07-07 DIAGNOSIS — E66.09 CLASS 1 OBESITY DUE TO EXCESS CALORIES WITHOUT SERIOUS COMORBIDITY IN ADULT, UNSPECIFIED BMI: ICD-10-CM

## 2022-07-07 PROCEDURE — 99214 OFFICE O/P EST MOD 30 MIN: CPT | Performed by: NURSE PRACTITIONER

## 2022-07-07 ASSESSMENT — ENCOUNTER SYMPTOMS
RESPIRATORY NEGATIVE: 1
GASTROINTESTINAL NEGATIVE: 1
COLOR CHANGE: 1
EYES NEGATIVE: 1

## 2022-07-07 NOTE — PROGRESS NOTES
Rosina Tinoco, a male of 45 y.o. came to the office 7/7/2022. Chief Complaint   Patient presents with    Consultation     rt foot pain      PROGRESS NOTE:     SUBJECTIVE:     7/7/2022: Ha[address] II is here for evaluation of right toes with tingling/numbness sensation and skin discoloration/hyperpigmentation going on x 4 months. This is new. Denies RLE pain. RLE with chronic edema. Wears thigh high compression socks daily during day without relief. H/O RLE DVT S/P thrombectomy in 10/2021 done by Dr. Rosalino Arias with successful removal of thrombus. 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 Also noted to have thrombus in right SSV superficial vein. Denies RLE claudication. On Eliquis managed by PCP Dr. Lazaro Christopher  PAD risk factors: smokes cigars x 30+ years, obesity  Denies chest pain or dyspnea. 10/26/2021: Hailey [address] II returns for one month RLE US duplex follow up S/P RLE DVT thrombectomy with successful recanalization of femoral and popliteal vein. Follow up [address] shows left femoral vein remains patent. Patient has duplicated mid femoral vein. There remains chronic thrombus to duplicated system at mid and distal femoral vein. He reports LE symptoms have improved with decrease in symptoms. Right leg edema is almost completelty resolved however occurs mildly in lower leg after prolonged standing. Left medial thigh with throbbing onset x 2 days occurs after prolonged standing. Denies any edema, discoloration, no induration, no noted ischemia. No S/sx bleed  Continues with [address] Eliquis with no S/sx bleed  Continues to wear daily during day thigh high compression sock which is effectively managing edema. Denies claudication. Denies chest pain. Denies shortness of breath. Right groin pain reported at last OV resolved.      9/13/2021: Hailey [address] II is here for venous procedure follow up S/P day 3 RLE total occlusive DVT venogram with thrombectomy with successful removal of total occlusive DVT from Right common femoral vein extending through popliteal vein. Here for removal of purse string suture to popliteal vein as level of fossa. He denies any post procedure complications. Reports mild right lower leg edema now without any pain. Intermittent mild right groin discomfort he developed just today, no swelling, no discoloration. Continues to wear daily during day thigh high compression sock which is managing well edema. Denies claudication. Denies chest pain. Denies shortness of breath. No S/sx bleed    8/31/2021 HPI: Romayne Sinks II referred by [facility] ED for evaluation of RLE DVT. Patient presents with symptoms of: Right lower leg with pain and swelling onset x 3 weeks ago. Denies injury. Was on Eliquis from previous H/O RLE DVT but quit taking it x 6 months ago as it is too expensive. He was reinitiated back on Eliquis in ED with Lovenox injection. Since ED visit the RLE edema and pain has decreased but still remains. US done in ED shows complete occlusion from Right common femoral vein extending through popliteal vein. Denies LLE symptoms    PAST HISTORY:   H/O RLE DVT. Last seen in office back in May 2020 for this as well as superficial thrombus to Right SSV. RLE venogam with Successful DVT thrombectomy was done May 2020.   2020 RLE venogram with IVUS demonstrated possible stenosis verses transient dependent on position in right common iliac vein of approximately 50%. 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 Due to fact that following RLE DVT thrombectomy patient symptoms were completely resolved no intervention of venous stenting was performed. Has seen Dr. Junior Parks in past and patient reports no evidence of clotting disorder. Leg elevation:  Daily with relief. Stocking use and dates: Wears daily class two compression stockings with moderate relief. Denies claudication. Denies chest pain. Denies shortness of breath. 7/7/2020:  Kathrynfelicitas Hernandez II is here to discuss results of follow up abd. CT to evaluate for right iliac vein stenosis. S/P 6 week Rt LE DVT thrombectomy. Today he denies pain or swelling to RLE. States some mild tenderness to right posterior calf at catheter insertion site however this is improving daily since the procedure and is also almost resolved. He is very pleased with results of thrombectomy. He will experience some mild RLE below distal calf swelling and tenderness If he is up on his feet for long period of time. Continues to wear compression stocking to RLE. Compliant with [employer]. PCP referred him to hematologist to evaluate for clotting disorder. He is to have follow up for this in August.      In office post operative follow up visit:   6/11/2010:   S/P two week right leg DVT thrombectomy. He continues to have RLE non pitting edema +1 knee distally to ankle area. He also continues to have right calf discomfort he's had since onset of DVT. He states that both edema and calf pain, although they still remain, are not as severe as prior to thrombectomy. He wears RLE thigh high compression stockings daily. Limits sodium intake. Continues to be active with working daily. Denies any worsening of RLE symptoms, shortness or breath, nausea, vomiting, fever. Has not yet gotten CT Pelvis to evaluate for any potential May Thurner Syndrome or Iliac vein compression. States he's otherwise been in good general health.         In office post operative follow up visit:   5/28/2020:   S/P day two right leg DVT thrombectomy. He is here for post operative evaluation and suture removal. He states his symptoms including right lower leg swelling and calf pain still remain however they have and continue to improve and decrease since the procedure. He is wearing the compression stocking thigh high daily during day.  He continues to take [employer] Eliquis. He denies any complications or complaints.            TELEHEALTH EVALUATION -- Audio/Visual (During PCMDG-93 public health emergency)  2020 HPI:Maverick Travis RENETTA (:  1970) has requested an audio/video evaluation for the following concern(s):  Referred by [facility] ED for evaluation of RLE acute DVT. Appears unprovoked. US yesterday in ED reports occlusive thrombus mid right femoral vein extending through popliteal vein. Superficial thrombus also noted in proximal Rt. SSV. Patent right CFV and proximal right FV. Right leg swelling mid thigh to feet onset x 1.5 weeks ago. Denies injury, long traveling. States no LE adverse symptoms prior to this. Pain to right lower leg he describes as muscle cramp he rates as 4/10. He states yesterday RLE was very red and today he states redness is minimal. He was initiated on Eliquis in ED yesterday along with being given a SQ Lovenox injection. Today he states redness and swelling have gone down some since yesterday but pain remains. Denies any H/O DVT or PE's. Denies any LE varicosities. Does not wear compression stockings. States he attempted to wear OTC compression socks a few years ago but quit wearing them as they cut into his leg causing pain. States he has left calf pain as well but this has been going on for several years. Denies left leg swelling. Denies bilateral LE fatigue or heaviness.    Family PCP Dr. Manda Wise follow up tomorrow for [address] [place] [address].            Family History   Problem Relation Age of Onset    Other Mother         COPD    Diabetes Father        Past Surgical History:   Procedure Laterality Date    CATARACT REMOVAL WITH IMPLANT Bilateral     IR [address]ROMB [address]  9/10/2021    IR THROMB [address] 9/10/2021 [facility] SPECIAL PROCEDURE        Past Medical History:   Diagnosis Date    Diabetes mellitus ([address]     Kidney stone     Pneumonia     Sleep apnea     Syncope and collapse 2018    Vasovagal syncope 2018 Year: Not on file    Unstable Housing in the Last Year: Not on file       No Known Allergies    Current Outpatient Medications on File Prior to Visit   Medication Sig Dispense Refill    rivaroxaban (XARELTO) 20 MG TABS tablet Take 1 tablet by mouth daily (with breakfast) 30 tablet 5    acetaminophen (TYLENOL) 500 MG tablet Take 1,000 mg by mouth every 8 hours as needed      celecoxib (CELEBREX) 200 MG capsule Take 1 capsule by mouth daily 30 capsule 5    CPAP Machine MISC by Does not apply route 1 each 0     No current facility-administered medications on file prior to visit. Review of Systems   Constitutional: Negative. HENT: Negative. Eyes: Negative. Respiratory: Negative. Cardiovascular: Positive for leg swelling (chronic right lower leg). Gastrointestinal: Negative. Endocrine: Negative. Genitourinary: Negative. Musculoskeletal: Negative. Skin: Positive for color change (hyperpigmentation right toes/distal foot). Neurological: Positive for numbness (right toes). Hematological: Negative. Psychiatric/Behavioral: Negative. OBJECTIVE:  BP (!) 162/82   Pulse 85   Wt 280 lb (127 kg)   BMI 37.97 kg/m²     Physical Exam  Constitutional:       General: He is not in acute distress. Appearance: Normal appearance. He is not ill-appearing. HENT:      Head: Normocephalic and atraumatic. Nose: Nose normal. No congestion. Cardiovascular:      Rate and Rhythm: Normal rate. Pulses:           Dorsalis pedis pulses are 2+ on the right side. Posterior tibial pulses are 2+ on the right side. Heart sounds: Normal heart sounds. Comments: RLE warm to touch, no bluish/purple discoloration or cyanosis. Pulmonary:      Effort: Pulmonary effort is normal.   Abdominal:      Palpations: Abdomen is soft. Musculoskeletal:         General: Tenderness (with palpation right distal leg and foot with palpation) present.  No swelling, deformity or signs of injury. Cervical back: Normal range of motion. Right lower leg: Edema (+1 non pitting below knee. ) present. Left lower leg: No edema. Skin:     General: Skin is warm and dry. Capillary Refill: Capillary refill takes 2 to 3 seconds. Findings: No bruising, erythema, lesion or rash. Comments: Skin hyperpigmentation right distal foot/toes   Neurological:      Mental Status: He is alert and oriented to person, place, and time. Psychiatric:         Mood and Affect: Mood normal.         Behavior: Behavior normal.       6/11/2020: Impression   1.  No signs of deep venous thrombosis in the bilateral lower extremity. 2.  No evidence of venous insufficiency is seen in the bilateral lower extremities. 3.  Note is made of an occlusive thrombus in the proximal right small saphenous vein.           COMPARISON:No prior studies are available for comparison.  .       DIAGNOSIS: I87.001 Post-thrombotic syndrome of right lower extremity ICD10       COMMENTS: I87.001 Post-thrombotic syndrome of right lower extremity ICD10         8/26/2021:   Narrative   VENOUS DUPLEX ULTRASOUND OF THE RIGHT LOWER EXTREMITY       CLINICAL HISTORY:  pain and right calf, hx dvt        COMPARISON:  NONE AVAILABLE        TECHNIQUE:  Sonographic imaging of the deep venous system of the right lower extremity was performed by a registered sonographer and the images were submitted for interpretation.         FINDINGS:  The right common femoral, superficial femoral, and popliteal veins demonstrate no color flow, augmentation, and compressibility. The study is positive for DVT in the right lower extremity.       The calf veins are not well seen.           Impression   This study is positive for DVT in the right lower extremity.           10/14/2021:  Impression   1. Again seen is a chronic nonocclusive thrombus in the proximal right femoral vein and mid to distal femoral vein.  The distal right femoral vein is duplicated and patent. The common femoral vein, popliteal vein, and calf veins are patent. This is grossly    unchanged.           Component Ref Range & Units 3/16/22 0816 10/12/20 1622 10/17/19 1521 2/14/19 1337 5/2/18 1042 3/5/12 0943   Cholesterol, Total 0 - 199 mg/dL 215 High   189 CM  173 CM  176 CM  206 High  CM     Comment: ATP III Cholesterol Classification is Borderline High. Triglycerides 0 - 150 mg/dL 349 High   234 High  CM  132 CM  132 CM  158 R, CM  114 R, CM    Comment: ATP III Triglycerides Classification is High. HDL 40 - 59 mg/dL 28 Low   35 Low  CM  37 Low  CM  35 Low  CM  36 Low  CM  44 CM    Comment: ATP III HDL Cholestrol Classification is low. Expected Values:     Males:    >55 = No Risk             35-55 = Moderate Risk             <35 = High Risk     Females:  >65 = No Risk             45-65 = Moderate Risk             <45 = High Risk     NCEP Guidelines:  Third Report May 2001   >59 = negative risk factor for CHD   <40 = major risk factor for CHD    LDL Calculated 0 - 129 mg/dL 117  107 CM  110 CM  115 CM  138 High  CM  124 CM    Comment: ATP III LDL Classification is Near Optimal.   Cholesterol       186 R, CM    LDl/HDL Ratio       2.8 R    Chol/HDL Ratio       4.2          ASSESSMENT AND PLAN:  Above diagnostic scans and procedures reviewed and discussed with patient. Entire medication list reviewed. Lab Lipid profile reviewed. Diagnosis Orders   1. Numbness of toes  US DUP LOWER EXTREMITY RIGHT ARTERIES   2. History of deep vein thrombosis (DVT) of lower extremity  US DUP LOWER EXTREMITY RIGHT ARTERIES   3. Pain and swelling of lower leg, right  US DUP LOWER EXTREMITY RIGHT ARTERIES   4. Class 1 obesity due to excess calories without serious comorbidity in adult, unspecified BMI  US DUP LOWER EXTREMITY RIGHT ARTERIES   5. Smoker  US DUP LOWER EXTREMITY RIGHT ARTERIES   6.  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 Also noted to have occlusive thrombus in right SSV superficial vein on US in 2020. Plan:     Orders Placed This Encounter   Procedures    US DUP LOWER EXTREMITY RIGHT ARTERIES     Standing Status:   Future     Standing Expiration Date:   9/5/2022       --  RLE arterial US due to PAD risk factors and symptoms with office follow up for results. If negative, symptoms are most likely related more with PTS post thrombotic syndrome. May consider referral to neurology after results back for neuropathy work up. He may now have some degree of RLE CVI however due to chronic DVT would not consider doing any venous procedures for treatment therefore only management would be with compression. This POC discussed with patient. --  Continue to wear knee or thigh high 20-30 mmhg compression socks daily during day and off nightly, wear as tolerated. Total time spent for this encounter: 35 minutes.     Jake Swan, ZOE - CNP

## 2022-07-20 ENCOUNTER — OFFICE VISIT (OUTPATIENT)
Dept: INTERVENTIONAL RADIOLOGY/VASCULAR | Age: 52
End: 2022-07-20
Payer: COMMERCIAL

## 2022-07-20 VITALS — DIASTOLIC BLOOD PRESSURE: 86 MMHG | WEIGHT: 280 LBS | SYSTOLIC BLOOD PRESSURE: 150 MMHG | BODY MASS INDEX: 37.97 KG/M2

## 2022-07-20 DIAGNOSIS — Z86.718 HISTORY OF DEEP VEIN THROMBOSIS (DVT) OF LOWER EXTREMITY: ICD-10-CM

## 2022-07-20 DIAGNOSIS — R20.0 NUMBNESS OF TOES: ICD-10-CM

## 2022-07-20 DIAGNOSIS — R68.89 ABNORMAL ANKLE BRACHIAL INDEX (ABI): ICD-10-CM

## 2022-07-20 DIAGNOSIS — R68.89 ABNORMAL ANKLE BRACHIAL INDEX (ABI): Primary | ICD-10-CM

## 2022-07-20 DIAGNOSIS — M79.661 PAIN AND SWELLING OF LOWER LEG, RIGHT: ICD-10-CM

## 2022-07-20 DIAGNOSIS — M79.89 PAIN AND SWELLING OF LOWER LEG, RIGHT: ICD-10-CM

## 2022-07-20 DIAGNOSIS — F17.200 SMOKER: ICD-10-CM

## 2022-07-20 DIAGNOSIS — E66.09 CLASS 1 OBESITY DUE TO EXCESS CALORIES WITHOUT SERIOUS COMORBIDITY IN ADULT, UNSPECIFIED BMI: ICD-10-CM

## 2022-07-20 LAB
ANION GAP SERPL CALCULATED.3IONS-SCNC: 14 MEQ/L (ref 9–15)
BUN BLDV-MCNC: 12 MG/DL (ref 6–20)
CALCIUM SERPL-MCNC: 9.1 MG/DL (ref 8.5–9.9)
CHLORIDE BLD-SCNC: 100 MEQ/L (ref 95–107)
CO2: 25 MEQ/L (ref 20–31)
CREAT SERPL-MCNC: 0.95 MG/DL (ref 0.7–1.2)
GFR AFRICAN AMERICAN: >60
GFR NON-AFRICAN AMERICAN: >60
GLUCOSE BLD-MCNC: 139 MG/DL (ref 70–99)
POTASSIUM SERPL-SCNC: 3.6 MEQ/L (ref 3.4–4.9)
SODIUM BLD-SCNC: 139 MEQ/L (ref 135–144)

## 2022-07-20 PROCEDURE — 99214 OFFICE O/P EST MOD 30 MIN: CPT | Performed by: NURSE PRACTITIONER

## 2022-07-20 ASSESSMENT — ENCOUNTER SYMPTOMS
GASTROINTESTINAL NEGATIVE: 1
RESPIRATORY NEGATIVE: 1
COLOR CHANGE: 1
EYES NEGATIVE: 1

## 2022-07-20 NOTE — PROGRESS NOTES
Isabel Thacker, a male of 45 y.o. came to the office 7/20/2022. Chief Complaint   Patient presents with    Results     PROGRESS NOTE:     SUBJECTIVE:     7/20/2022: Mervat Yuan II is here for results of RLE arterial US. Continues to have RLE symptoms of chronic edema, right toes with tingling/numbness sensation and skin discoloration/hyperpigmentation going on x 4 months. No Pain. Denies RLE claudication. H/O RLE DVT S/P thrombectomy in 10/2021 done by Dr. Rosalinda Lopez with successful removal of thrombus. 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 Also noted to have thrombus in right SSV superficial vein. Now on Xarelto due to insurance coverage and managed by PCP Dr. Kimber Kyle  PAD risk factors: smokes cigars x 30+ years, obesity  Denies chest pain or dyspnea. Denies any LLE adverse symptoms. Denies chest pain. Denies shortness of breath. 7/7/2022: Mervat Yuan II is here for evaluation of right toes with tingling/numbness sensation and skin discoloration/hyperpigmentation going on x 4 months. This is new. Denies RLE pain. RLE with chronic edema. Wears thigh high compression socks daily during day without relief. H/O RLE DVT S/P thrombectomy in 10/2021 done by Dr. Rosalinda Lopez with successful removal of thrombus. 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 Also noted to have thrombus in right SSV superficial vein. Denies RLE claudication. On Eliquis managed by PCP Dr. Kimber Kyle  PAD risk factors: smokes cigars x 30+ years, obesity  Denies chest pain or dyspnea. 10/26/2021: Mervat Yuan II returns for one month RLE US duplex follow up S/P RLE DVT thrombectomy with successful recanalization of femoral and popliteal vein. Follow up [address] shows left femoral vein remains patent. Patient has duplicated mid femoral vein.  There remains chronic thrombus to duplicated system at mid and distal femoral vein. He reports LE symptoms have improved with decrease in symptoms. Right leg edema is almost completelty resolved however occurs mildly in lower leg after prolonged standing. Left medial thigh with throbbing onset x 2 days occurs after prolonged standing. Denies any edema, discoloration, no induration, no noted ischemia. No S/sx bleed  Continues with [address] Eliquis with no S/sx bleed  Continues to wear daily during day thigh high compression sock which is effectively managing edema. Denies claudication. Denies chest pain. Denies shortness of breath. Right groin pain reported at last OV resolved. 9/13/2021: John Moreno II is here for venous procedure follow up S/P day 3 RLE total occlusive DVT venogram with thrombectomy with successful removal of total occlusive DVT from Right common femoral vein extending through popliteal vein. Here for removal of purse string suture to popliteal vein as level of fossa. He denies any post procedure complications. Reports mild right lower leg edema now without any pain. Intermittent mild right groin discomfort he developed just today, no swelling, no discoloration. Continues to wear daily during day thigh high compression sock which is managing well edema. Denies claudication. Denies chest pain. Denies shortness of breath. No S/sx bleed    8/31/2021 HPI: John Moreno II referred by [facility] ED for evaluation of RLE DVT. Patient presents with symptoms of: Right lower leg with pain and swelling onset x 3 weeks ago. Denies injury. Was on Eliquis from previous H/O RLE DVT but quit taking it x 6 months ago as it is too expensive. He was reinitiated back on Eliquis in ED with Lovenox injection. Since ED visit the RLE edema and pain has decreased but still remains. US done in ED shows complete occlusion from Right common femoral vein extending through popliteal vein.    Denies LLE symptoms    PAST HISTORY:   H/O RLE DVT. Last seen in office back in May 2020 for this as well as superficial thrombus to Right SSV. RLE venogam with Successful DVT thrombectomy was done May 2020.   2020 RLE venogram with IVUS demonstrated possible stenosis verses transient dependent on position in right common iliac vein of approximately 50%. 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 Due to fact that following RLE DVT thrombectomy patient symptoms were completely resolved no intervention of venous stenting was performed. Has seen Dr. Rich Contreras in past and patient reports no evidence of clotting disorder. Leg elevation:  Daily with relief. Stocking use and dates: Wears daily class two compression stockings with moderate relief. Denies claudication. Denies chest pain. Denies shortness of breath. 7/7/2020:  Louis Riggs II is here to discuss results of follow up abd. CT to evaluate for right iliac vein stenosis. S/P 6 week Rt LE DVT thrombectomy. Today he denies pain or swelling to RLE. States some mild tenderness to right posterior calf at catheter insertion site however this is improving daily since the procedure and is also almost resolved. He is very pleased with results of thrombectomy. He will experience some mild RLE below distal calf swelling and tenderness If he is up on his feet for long period of time. Continues to wear compression stocking to RLE. Compliant with [address]. PCP referred him to hematologist to evaluate for clotting disorder. He is to have follow up for this in August.      In office post operative follow up visit:   6/11/2010:   S/P two week right leg DVT thrombectomy. He continues to have RLE non pitting edema +1 knee distally to ankle area. He also continues to have right calf discomfort he's had since onset of DVT. He states that both edema and calf pain, although they still remain, are not as severe as prior to thrombectomy.  He wears RLE thigh high compression stockings daily. Limits sodium intake. Continues to be active with working daily. Denies any worsening of RLE symptoms, shortness or breath, nausea, vomiting, fever. Has not yet gotten CT Pelvis to evaluate for any potential May Thurner Syndrome or Iliac vein compression. States he's otherwise been in good general health. In office post operative follow up visit:   2020:   S/P day two right leg DVT thrombectomy. He is here for post operative evaluation and suture removal. He states his symptoms including right lower leg swelling and calf pain still remain however they have and continue to improve and decrease since the procedure. He is wearing the compression stocking thigh high daily during day. He continues to take [address] Eliquis. He denies any complications or complaints. TELEHEALTH EVALUATION -- Audio/Visual (During [facility] public health emergency)  2020 HPI:Maverick Travis II (:  1970) has requested an audio/video evaluation for the following concern(s):  Referred by [facility] ED for evaluation of RLE acute DVT. Appears unprovoked. US yesterday in ED reports occlusive thrombus mid right femoral vein extending through popliteal vein. Superficial thrombus also noted in proximal Rt. SSV. Patent right CFV and proximal right FV. Right leg swelling mid thigh to feet onset x 1.5 weeks ago. Denies injury, long traveling. States no LE adverse symptoms prior to this. Pain to right lower leg he describes as muscle cramp he rates as 4/10. He states yesterday RLE was very red and today he states redness is minimal. He was initiated on Eliquis in ED yesterday along with being given a SQ Lovenox injection. Today he states redness and swelling have gone down some since yesterday but pain remains. Denies any H/O DVT or PE's. Denies any LE varicosities. Does not wear compression stockings.  States he attempted to wear OTC compression socks a few years ago but quit wearing them as they cut into his leg causing pain. States he has left calf pain as well but this has been going on for several years. Denies left leg swelling. Denies bilateral LE fatigue or heaviness. Family PCP Dr. Ian Temple follow up tomorrow for [facility].             Family History   Problem Relation Age of Onset    Other Mother         COPD    Diabetes Father        Past Surgical History:   Procedure Laterality Date    CATARACT REMOVAL WITH IMPLANT Bilateral     IR THROMB [facility]  9/10/2021    IR THROMB [address] 9/10/2021 [facility] SPECIAL PROCEDURE        Past Medical History:   Diagnosis Date    Diabetes mellitus ([address]     Kidney stone     Pneumonia     Sleep apnea     Syncope and collapse 2018    Vasovagal syncope 2018       Social History     Socioeconomic History    Marital status:    Tobacco Use    Smoking status: Former     Packs/day: 0.25     Years: 30.00     Pack years: 7.50     Types: Cigars, Cigarettes     Quit date: 2021     Years since quittin.8    Smokeless tobacco: Never   Vaping Use    Vaping Use: Never used   Substance and Sexual Activity    Alcohol use: No     Comment: social    Drug use: No     Social Determinants of Health     Financial Resource Strain: Low Risk     Difficulty of Paying Living Expenses: Not hard at all   Food Insecurity: No Food Insecurity    Worried About Running Out of Food in the Last Year: Never true    Ran Out of Food in the Last Year: Never true   Transportation Needs: No Transportation Needs    Lack of Transportation (Medical): No    Lack of Transportation (Non-Medical): No       No Known Allergies    Current Outpatient Medications on File Prior to Visit   Medication Sig Dispense Refill    rivaroxaban (XARELTO) 20 MG TABS tablet Take 1 tablet by mouth daily (with breakfast) 30 tablet 5    acetaminophen (TYLENOL) 500 MG tablet Take 1,000 mg by mouth every 8 hours as needed      celecoxib (CELEBREX) 200 MG capsule Take 1 capsule by mouth daily 30 capsule 5 CPAP Machine MISC by Does not apply route 1 each 0     No current facility-administered medications on file prior to visit. Review of Systems   Constitutional: Negative. HENT: Negative. Eyes: Negative. Respiratory: Negative. Cardiovascular:  Positive for leg swelling (chronic right lower leg). Gastrointestinal: Negative. Endocrine: Negative. Genitourinary: Negative. Musculoskeletal: Negative. Skin:  Positive for color change (hyperpigmentation right toes/distal foot). Neurological:  Positive for numbness (right toes). Hematological: Negative. Psychiatric/Behavioral: Negative. OBJECTIVE:  BP (!) 150/86   Wt 280 lb (127 kg)   BMI 37.97 kg/m²     Physical Exam  Constitutional:       General: He is not in acute distress. Appearance: Normal appearance. He is not ill-appearing. HENT:      Head: Normocephalic and atraumatic. Nose: Nose normal. No congestion. Cardiovascular:      Rate and Rhythm: Normal rate. Pulses:           Dorsalis pedis pulses are 2+ on the right side. Posterior tibial pulses are 2+ on the right side. Heart sounds: Normal heart sounds. Comments: RLE warm to touch, no bluish/purple discoloration or cyanosis. Pulmonary:      Effort: Pulmonary effort is normal.   Abdominal:      Palpations: Abdomen is soft. Musculoskeletal:         General: Tenderness (with palpation right distal leg and foot with palpation) present. No swelling, deformity or signs of injury. Cervical back: Normal range of motion. Right lower leg: Edema (+1 non pitting below knee. ) present. Left lower leg: No edema. Skin:     General: Skin is warm and dry. Capillary Refill: Capillary refill takes 2 to 3 seconds. Findings: No bruising, erythema, lesion or rash. Comments: Skin hyperpigmentation right distal foot/toes   Neurological:      Mental Status: He is alert and oriented to person, place, and time.    Psychiatric: Mood and Affect: Mood normal.         Behavior: Behavior normal.     6/11/2020: Impression   1. No signs of deep venous thrombosis in the bilateral lower extremity. 2.  No evidence of venous insufficiency is seen in the bilateral lower extremities. 3.  Note is made of an occlusive thrombus in the proximal right small saphenous vein. COMPARISON:No prior studies are available for comparison. Brittany Noy DIAGNOSIS: I87.001 Post-thrombotic syndrome of right lower extremity ICD10       COMMENTS: I87.001 Post-thrombotic syndrome of right lower extremity ICD10         8/26/2021:   Narrative   VENOUS DUPLEX ULTRASOUND OF THE RIGHT LOWER EXTREMITY       CLINICAL HISTORY:  pain and right calf, hx dvt        COMPARISON:  NONE AVAILABLE        TECHNIQUE:  Sonographic imaging of the deep venous system of the right lower extremity was performed by a registered sonographer and the images were submitted for interpretation. FINDINGS:  The right common femoral, superficial femoral, and popliteal veins demonstrate no color flow, augmentation, and compressibility. The study is positive for DVT in the right lower extremity. The calf veins are not well seen. Impression   This study is positive for DVT in the right lower extremity. 10/14/2021:  Impression   1. Again seen is a chronic nonocclusive thrombus in the proximal right femoral vein and mid to distal femoral vein. The distal right femoral vein is duplicated and patent. The common femoral vein, popliteal vein, and calf veins are patent. This is grossly    unchanged. 7/13/2022:   Narrative   EXAMINATION: US DUP LOWER EXTREMITY RIGHT ARTERIES        DATE AND TIME:7/13/2022 9:35 AM       CLINICAL HISTORY: Z86.718 History of deep vein thrombosis (DVT) of lower extremity ICD10        COMPARISON: None available. Technique:   The following arteries were evaluated in the right leg: CFA, SFA, popliteal artery and a few of the runoff vessels. The measurements of the systolic and diastolic velocities at the various levels of these arteries are noted in the graph    below. FINDINGS:       RIGHT LOWER EXTREMITY: There is blunted monophasic waveform blood flow with slow velocities throughout all of the arteries in the right lower extremity from the common femoral artery to the distal trivessel runoff. Impression       ARTERIAL DISEASE NOTED THROUGHOUT THE ARTERIES OF THE RIGHT LOWER EXTREMITY. Component Ref Range & Units 3/16/22 0816 8/26/21 1645 10/12/20 1622 5/20/20 1145 3/21/19 0015 2/14/19 1337 5/2/18 1042   Sodium 135 - 144 mEq/L 140  138  138  141  140 CM  142 CM  142 R    Potassium 3.4 - 4.9 mEq/L 4.5  4.2  3.8  3.8  3.9 CM  3.6 CM  4.2 R    Chloride 95 - 107 mEq/L 102  101  100  104  102 CM  99 CM  102 R    CO2 20 - 31 mEq/L 28  27  26  27  25 CM  25 CM  26 R    Anion Gap 9 - 15 mEq/L 10  10  12  10  13 CM  18 High  CM  14 High  R    Glucose 70 - 99 mg/dL 148 High   119 High   82  140 High   172 High  CM  139 High  CM  178 High  R    BUN 6 - 20 mg/dL 15  18  19  19  20  16  13    Creatinine 0.70 - 1.20 mg/dL 0.96  1.00  1.00  0.80  0.96  0.80  0.85    GFR Non- >60 >60.0  >60.0 CM  >60.0 CM  >60.0 CM  >60.0 CM  >60.0 CM  >60.0 CM    Comment: >60 mL/min/1.73m2 EGFR, calc. for ages 25 and older using the   MDRD formula (not corrected for weight), is valid for stable   renal function. GFR  >60 >60.0  >60.0 CM  >60.0 CM  >60.0 CM  >60.0 CM  >60.0 CM  >60.0 CM    Comment: >60 mL/min/1.73m2 EGFR, calc. for ages 25 and older using the   MDRD formula (not corrected for weight), is valid for stable   renal function. ASSESSMENT AND PLAN:  Above diagnostic scan RLE arterial US reviewed personally by myself and discussed with patient. US reports decreased RLE blood velocity with monophasic throughout suggesting significant PAD. Entire medication list reviewed. Labs reviewed: CMP     Diagnosis Orders   1. Abnormal ankle brachial index (ASHLEE)  Basic Metabolic Panel    CTA ABDOMINAL AORTA W BILAT RUNOFF W WO CONTRAST      2. Pain and swelling of lower leg, right  Basic Metabolic Panel    CTA ABDOMINAL AORTA W BILAT RUNOFF W WO CONTRAST      3. History of deep vein thrombosis (DVT) of lower extremity  Basic Metabolic Panel    CTA ABDOMINAL AORTA W BILAT RUNOFF W WO CONTRAST      4. Class 1 obesity due to excess calories without serious comorbidity in adult, unspecified BMI  Basic Metabolic Panel    CTA ABDOMINAL AORTA W BILAT RUNOFF W WO CONTRAST      5. Smoker  Basic Metabolic Panel    CTA ABDOMINAL AORTA W BILAT RUNOFF W WO CONTRAST      6. 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 Also noted to have occlusive thrombus in right SSV superficial vein on US in 2020. Plan:     Orders Placed This Encounter   Procedures    CTA ABDOMINAL AORTA W BILAT RUNOFF W WO CONTRAST     Standing Status:   Future     Standing Expiration Date:   7/20/2023     Order Specific Question:   STAT Creatinine as needed:     Answer:   No     Order Specific Question:   Reason for exam:     Answer:   Dr. Karley Matias to read    Basic Metabolic Panel     Standing Status:   Future     Standing Expiration Date:   7/20/2023       --  BLE CTA with office follow up for results.        ZOE Schwab - CNP

## 2022-08-19 ENCOUNTER — HOSPITAL ENCOUNTER (OUTPATIENT)
Dept: CT IMAGING | Age: 52
Discharge: HOME | End: 2022-08-21
Payer: COMMERCIAL

## 2022-08-19 DIAGNOSIS — Z86.718 HISTORY OF DEEP VEIN THROMBOSIS (DVT) OF LOWER EXTREMITY: ICD-10-CM

## 2022-08-19 DIAGNOSIS — M79.661 PAIN AND SWELLING OF LOWER LEG, RIGHT: ICD-10-CM

## 2022-08-19 DIAGNOSIS — F17.200 SMOKER: ICD-10-CM

## 2022-08-19 DIAGNOSIS — R20.0 NUMBNESS OF TOES: ICD-10-CM

## 2022-08-19 DIAGNOSIS — M79.89 PAIN AND SWELLING OF LOWER LEG, RIGHT: ICD-10-CM

## 2022-08-19 DIAGNOSIS — E66.09 CLASS 1 OBESITY DUE TO EXCESS CALORIES WITHOUT SERIOUS COMORBIDITY IN ADULT, UNSPECIFIED BMI: ICD-10-CM

## 2022-08-19 DIAGNOSIS — R68.89 ABNORMAL ANKLE BRACHIAL INDEX (ABI): ICD-10-CM

## 2022-08-19 PROCEDURE — 75635 CT ANGIO ABDOMINAL ARTERIES: CPT

## 2022-08-19 PROCEDURE — 75635 CT ANGIO ABDOMINAL ARTERIES: CPT | Performed by: RADIOLOGY

## 2022-08-19 PROCEDURE — 6360000004 HC RX CONTRAST MEDICATION: Performed by: NURSE PRACTITIONER

## 2022-08-19 RX ORDER — SODIUM CHLORIDE 9 MG/ML
75 INJECTION, SOLUTION INTRAVENOUS ONCE
Status: DISCONTINUED | OUTPATIENT
Start: 2022-08-19 | End: 2022-08-22 | Stop reason: HOSPADM

## 2022-08-19 RX ADMIN — IOPAMIDOL 150 ML: 755 INJECTION, SOLUTION INTRAVENOUS at 15:41

## 2022-08-22 ENCOUNTER — TELEPHONE (OUTPATIENT)
Dept: INTERVENTIONAL RADIOLOGY/VASCULAR | Age: 52
End: 2022-08-22

## 2022-08-22 NOTE — TELEPHONE ENCOUNTER
Please make sure patient has follow up with me to go over LE CTA results.  thx DISPLAY PLAN FREE TEXT

## 2022-08-29 ENCOUNTER — OFFICE VISIT (OUTPATIENT)
Dept: INTERVENTIONAL RADIOLOGY/VASCULAR | Age: 52
End: 2022-08-29
Payer: COMMERCIAL

## 2022-08-29 VITALS
HEIGHT: 72 IN | WEIGHT: 280 LBS | SYSTOLIC BLOOD PRESSURE: 124 MMHG | HEART RATE: 89 BPM | BODY MASS INDEX: 37.93 KG/M2 | DIASTOLIC BLOOD PRESSURE: 80 MMHG

## 2022-08-29 DIAGNOSIS — M79.89 PAIN AND SWELLING OF LOWER LEG, RIGHT: ICD-10-CM

## 2022-08-29 DIAGNOSIS — E66.09 CLASS 1 OBESITY DUE TO EXCESS CALORIES WITHOUT SERIOUS COMORBIDITY IN ADULT, UNSPECIFIED BMI: ICD-10-CM

## 2022-08-29 DIAGNOSIS — I73.9 PAD (PERIPHERAL ARTERY DISEASE) (HCC): Primary | ICD-10-CM

## 2022-08-29 DIAGNOSIS — R93.89 ABNORMAL COMPUTED TOMOGRAPHY ANGIOGRAPHY (CTA): ICD-10-CM

## 2022-08-29 DIAGNOSIS — M79.661 PAIN AND SWELLING OF LOWER LEG, RIGHT: ICD-10-CM

## 2022-08-29 DIAGNOSIS — F17.200 SMOKER: ICD-10-CM

## 2022-08-29 DIAGNOSIS — E08.01 DIABETES MELLITUS DUE TO UNDERLYING CONDITION WITH HYPEROSMOLAR COMA, UNSPECIFIED WHETHER LONG TERM INSULIN USE (HCC): ICD-10-CM

## 2022-08-29 DIAGNOSIS — R68.89 ABNORMAL ANKLE BRACHIAL INDEX (ABI): ICD-10-CM

## 2022-08-29 PROCEDURE — 99213 OFFICE O/P EST LOW 20 MIN: CPT | Performed by: NURSE PRACTITIONER

## 2022-08-29 ASSESSMENT — ENCOUNTER SYMPTOMS
GASTROINTESTINAL NEGATIVE: 1
RESPIRATORY NEGATIVE: 1
COLOR CHANGE: 1
EYES NEGATIVE: 1

## 2022-08-29 NOTE — PROGRESS NOTES
Lion Byers, a male of 45 y.o. came to the office 8/29/2022. Chief Complaint   Patient presents with    Follow-up     F/u on ct results     PROGRESS NOTE:     SUBJECTIVE:     8/29/2022: Shahla Reis II is here for results of LE CTA as he had abnormal LE arterial US. CTA shows BLE PAD more on RLE. He reports Right leg with numbness and tingling from thighs to toes with pain with palpation to right foot. Left asymptomatic. Did not have any RLE pain at last OV, now with some pain in foot. Wears compression socks daily. H/O RLE DVT S/P thrombectomy in 10/2021 done by Dr. Jw Barrett with successful removal of thrombus. 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 Also noted to have thrombus in right SSV superficial vein. Now on Xarelto due to insurance coverage and managed by PCP Dr. Cara Ledbetter. No bleeding concerns. PAD risk factors: smokes cigars x 30+ years, obesity  Denies chest pain or dyspnea. Denies any LLE adverse symptoms. Denies chest pain. Denies shortness of breath.     7/20/2022: Shahla Reis II is here for results of RLE arterial US. Continues to have RLE symptoms of chronic edema, right toes with tingling/numbness sensation and skin discoloration/hyperpigmentation going on x 4 months. No Pain. Denies RLE claudication. H/O RLE DVT S/P thrombectomy in 10/2021 done by Dr. Jw Barrett with successful removal of thrombus. 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 Also noted to have thrombus in right SSV superficial vein. Now on Xarelto due to insurance coverage and managed by PCP Dr. Cara Ledbetter  PAD risk factors: smokes cigars x 30+ years, obesity  Denies chest pain or dyspnea. Denies any LLE adverse symptoms. Denies chest pain. Denies shortness of breath.      7/7/2022: Shahla Reis II is here for evaluation of right toes with tingling/numbness sensation and skin discoloration/hyperpigmentation going on x 4 months. This is new. Denies RLE pain. RLE with chronic edema. Wears thigh high compression socks daily during day without relief. H/O RLE DVT S/P thrombectomy in 10/2021 done by Dr. Hickman with successful removal of thrombus. 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 Also noted to have thrombus in right SSV superficial vein. Denies RLE claudication. On Eliquis managed by PCP Dr. Hedy Otero  PAD risk factors: smokes cigars x 30+ years, obesity  Denies chest pain or dyspnea. 10/26/2021: Duke Boudreaux II returns for one month RLE US duplex follow up S/P RLE DVT thrombectomy with successful recanalization of femoral and popliteal vein. Follow up [address] [facility] shows left femoral vein remains patent. Patient has duplicated mid femoral vein. There remains chronic thrombus to duplicated system at mid and distal femoral vein. He reports LE symptoms have improved with decrease in symptoms. Right leg edema is almost completelty resolved however occurs mildly in lower leg after prolonged standing. Left medial thigh with throbbing onset x 2 days occurs after prolonged standing. Denies any edema, discoloration, no induration, no noted ischemia. No S/sx bleed  Continues with [address] [facility] Eliquis with no S/sx bleed  Continues to wear daily during day thigh high compression sock which is effectively managing edema. Denies claudication. Denies chest pain. Denies shortness of breath. Right groin pain reported at last OV resolved. 9/13/2021: Duke Boudreaux II is here for venous procedure follow up S/P day 3 RLE total occlusive DVT venogram with thrombectomy with successful removal of total occlusive DVT from Right common femoral vein extending through popliteal vein. Here for removal of purse string suture to popliteal vein as level of fossa. He denies any post procedure complications.    Reports mild right lower leg edema now without any pain. Intermittent mild right groin discomfort he developed just today, no swelling, no discoloration. Continues to wear daily during day thigh high compression sock which is managing well edema. Denies claudication. Denies chest pain. Denies shortness of breath. No S/sx bleed    8/31/2021 HPI: Milad Peacock II referred by [facility] ED for evaluation of RLE DVT. Patient presents with symptoms of: Right lower leg with pain and swelling onset x 3 weeks ago. Denies injury. Was on Eliquis from previous H/O RLE DVT but quit taking it x 6 months ago as it is too expensive. He was reinitiated back on Eliquis in ED with Lovenox injection. Since ED visit the RLE edema and pain has decreased but still remains. US done in ED shows complete occlusion from Right common femoral vein extending through popliteal vein. Denies LLE symptoms    PAST HISTORY:   H/O RLE DVT. Last seen in office back in May 2020 for this as well as superficial thrombus to Right SSV. RLE venogam with Successful DVT thrombectomy was done May 2020.   2020 RLE venogram with IVUS demonstrated possible stenosis verses transient dependent on position in right common iliac vein of approximately 50%. 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 Due to fact that following RLE DVT thrombectomy patient symptoms were completely resolved no intervention of venous stenting was performed. Has seen Dr. Sabrina Wang in past and patient reports no evidence of clotting disorder. Leg elevation:  Daily with relief. Stocking use and dates: Wears daily class two compression stockings with moderate relief. Denies claudication. Denies chest pain. Denies shortness of breath. 7/7/2020:  Milad Peacock II is here to discuss results of follow up abd. CT to evaluate for right iliac vein stenosis. S/P 6 week Rt LE DVT thrombectomy. Today he denies pain or swelling to RLE.  States some Vaping Use: Never used   Substance and Sexual Activity    Alcohol use: No     Comment: social    Drug use: No     Social Determinants of Health     Financial Resource Strain: Low Risk     Difficulty of Paying Living Expenses: Not hard at all   Food Insecurity: No Food Insecurity    Worried About Running Out of Food in the Last Year: Never true    Ran Out of Food in the Last Year: Never true   Transportation Needs: No Transportation Needs    Lack of Transportation (Medical): No    Lack of Transportation (Non-Medical): No       No Known Allergies    Current Outpatient Medications on File Prior to Visit   Medication Sig Dispense Refill    rivaroxaban (XARELTO) 20 MG TABS tablet Take 1 tablet by mouth daily (with breakfast) 30 tablet 5    acetaminophen (TYLENOL) 500 MG tablet Take 1,000 mg by mouth every 8 hours as needed      celecoxib (CELEBREX) 200 MG capsule Take 1 capsule by mouth daily 30 capsule 5    CPAP Machine MISC by Does not apply route 1 each 0     No current facility-administered medications on file prior to visit. Review of Systems   Constitutional:  Positive for fatigue. HENT: Negative. Eyes: Negative. Respiratory: Negative. Cardiovascular:  Negative for leg swelling. Gastrointestinal: Negative. Endocrine: Negative. Genitourinary: Negative. Musculoskeletal: Negative. Skin:  Positive for color change (hyperpigmentation right toes/distal foot). Neurological:  Positive for numbness (right leg from thigh to toes). Hematological: Negative. Psychiatric/Behavioral: Negative. OBJECTIVE:  /80   Pulse 89   Ht 6' (1.829 m)   Wt 280 lb (127 kg)   BMI 37.97 kg/m²     Physical Exam  Constitutional:       General: He is not in acute distress. Appearance: Normal appearance. He is not ill-appearing. HENT:      Head: Normocephalic and atraumatic. Nose: Nose normal. No congestion. Cardiovascular:      Rate and Rhythm: Normal rate.       Pulses: Dorsalis pedis pulses are 2+ on the right side. Posterior tibial pulses are 2+ on the right side. Heart sounds: Normal heart sounds. Comments: RLE warm to touch, no bluish/purple discoloration or cyanosis. Pulmonary:      Effort: Pulmonary effort is normal.   Abdominal:      Palpations: Abdomen is soft. Musculoskeletal:         General: Tenderness (with palpation right distal leg and foot with palpation) present. No swelling, deformity or signs of injury. Cervical back: Normal range of motion. Right lower leg: Edema (+1 non pitting below knee. ) present. Left lower leg: No edema. Skin:     General: Skin is warm and dry. Capillary Refill: Capillary refill takes 2 to 3 seconds. Findings: No bruising, erythema, lesion or rash. Comments: Skin hyperpigmentation right distal foot/toes   Neurological:      Mental Status: He is alert and oriented to person, place, and time. Psychiatric:         Mood and Affect: Mood normal.         Behavior: Behavior normal.     6/11/2020: Impression   1. No signs of deep venous thrombosis in the bilateral lower extremity. 2.  No evidence of venous insufficiency is seen in the bilateral lower extremities. 3.  Note is made of an occlusive thrombus in the proximal right small saphenous vein. COMPARISON:No prior studies are available for comparison. Tara Raghu DIAGNOSIS: I87.001 Post-thrombotic syndrome of right lower extremity ICD10       COMMENTS: I87.001 Post-thrombotic syndrome of right lower extremity ICD10         8/26/2021:   Narrative   VENOUS DUPLEX ULTRASOUND OF THE RIGHT LOWER EXTREMITY       CLINICAL HISTORY:  pain and right calf, hx dvt        COMPARISON:  NONE AVAILABLE        TECHNIQUE:  Sonographic imaging of the deep venous system of the right lower extremity was performed by a registered sonographer and the images were submitted for interpretation.          FINDINGS:  The right common femoral, superficial femoral, and popliteal veins demonstrate no color flow, augmentation, and compressibility. The study is positive for DVT in the right lower extremity. The calf veins are not well seen. Impression   This study is positive for DVT in the right lower extremity. 10/14/2021:  Impression   1. Again seen is a chronic nonocclusive thrombus in the proximal right femoral vein and mid to distal femoral vein. The distal right femoral vein is duplicated and patent. The common femoral vein, popliteal vein, and calf veins are patent. This is grossly    unchanged. 7/13/2022:   Narrative   EXAMINATION: US DUP LOWER EXTREMITY RIGHT ARTERIES        DATE AND TIME:7/13/2022 9:35 AM       CLINICAL HISTORY: Z86.718 History of deep vein thrombosis (DVT) of lower extremity ICD10        COMPARISON: None available. Technique: The following arteries were evaluated in the right leg: CFA, SFA, popliteal artery and a few of the runoff vessels. The measurements of the systolic and diastolic velocities at the various levels of these arteries are noted in the graph    below. FINDINGS:       RIGHT LOWER EXTREMITY: There is blunted monophasic waveform blood flow with slow velocities throughout all of the arteries in the right lower extremity from the common femoral artery to the distal trivessel runoff. Impression       ARTERIAL DISEASE NOTED THROUGHOUT THE ARTERIES OF THE RIGHT LOWER EXTREMITY. 8/19/2022:   Impression   1. Severe atherosclerotic disease and mural thrombus and the infrarenal abdominal aorta causing complete occlusion at the lower aortic bifurcation with occlusion of the bilateral common iliacs and reconstitutes at the external iliac arteries. 2.There is a 40% stenosis of the right common femoral artery due to atherosclerotic disease. 3.There is occlusion of the distal right anterior tibial artery.    4.Diffusely low-attenuation liver, likely secondary to fatty infiltration or steatosis. COMPARISON: July 1, 2020       DIAGNOSIS: Abnormal ankle brachial index       COMMENTS: None       TECHNIQUE: Spiral scanning of the abdomen and pelvis was performed after administration of intravenous contrast.        CT Dose-Length Product (estimate related to radiation exposure from this exam):  2331.89  mGy*cm. CTA:   The suprarenal abdominal aorta is unremarkable. The infrarenal abdominal aorta demonstrates severe atherosclerotic disease with mural thrombus causing severe stenosis of the infrarenal abdominal aorta with complete occlusion of the abdominal aorta at the    bifurcation. There is also complete occlusion of the proximal bilateral common iliac arteries. The arteries reconstitute at the external iliac level bilaterally. There is 40% stenosis of the right common femoral artery due to atherosclerotic disease. There is occlusion of the lower right anterior tibial artery. The remaining bilateral lower extremity arteries are patent. CT ABDOMEN:         The visualized portions of lung bases are clear. The liver is diffusely low in density which may represent fatty infiltration. The spleen is of normal size and attenuation without focal lesions. Pancreas shows no signs of focal mass or peripancreatic fluid collection. Kidneys are normal in size. There is no hydronephrosis. No renal mass is identified. Adrenal glands are unremarkable. Aorta is normal in caliber without signs of aneurysmal dilatation. No significant retroperitoneal adenopathy or ascites is identified. The visualized bowel loops    are unremarkable. CT PELVIS:    Scanning of the pelvis shows no signs of pelvic mass or pelvic fluid collection. There is subtle diverticulosis without any evidence of diverticulitis. ASSESSMENT AND PLAN:         Diagnosis Orders   1. PAD (peripheral artery disease) ([address]  Ambulatory referral to Cardiology      2.  Abnormal ankle brachial index ([place])  Ambulatory referral to Cardiology      3. Pain and swelling of lower leg, right  Ambulatory referral to Cardiology      4. Class 1 obesity due to excess calories without serious comorbidity in adult, unspecified BMI  Ambulatory referral to Cardiology      5. Smoker  Ambulatory referral to Cardiology      6. Diabetes mellitus due to underlying condition with hyperosmolar coma, unspecified whether long term insulin use ([facility] [address]  Ambulatory referral to Cardiology      7. 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 Also noted to have occlusive thrombus in right SSV superficial vein on US in 2020. Plan:     Orders Placed This Encounter   Procedures    Ambulatory referral to Cardiology     Referral Priority:   Routine     Referral Type:   Eval and Treat     Referral Reason:   Specialty Services Required     Requested Specialty:   Cardiology     Number of Visits Requested:   1     Above BLE CTA reviewed personally by myself and discussed with patient. BLE arterial disease, worse on RLE. Infrarenal abdominal Thrombus with complete occlusion at the distal bifurcation and occluding both iliac arteries. 40% stenosis of right common femoral artery and total occlusion of right anterior tibial distal artery. Refer to interventional cardiology Dr. Edinson Onofre for evaluation and management of BLE PAD. No further follow up care required in vascular clinic. Total time spent for this encounter:  25  minutes.       [facility], APRN - CNP

## 2022-09-12 ENCOUNTER — OFFICE VISIT (OUTPATIENT)
Dept: CARDIOLOGY CLINIC | Age: 52
End: 2022-09-12
Payer: COMMERCIAL

## 2022-09-12 VITALS
HEIGHT: 72 IN | SYSTOLIC BLOOD PRESSURE: 124 MMHG | WEIGHT: 277.4 LBS | BODY MASS INDEX: 37.57 KG/M2 | HEART RATE: 91 BPM | OXYGEN SATURATION: 93 % | DIASTOLIC BLOOD PRESSURE: 88 MMHG

## 2022-09-12 DIAGNOSIS — I82.411 ACUTE DEEP VEIN THROMBOSIS (DVT) OF RIGHT FEMORAL VEIN (HCC): Primary | ICD-10-CM

## 2022-09-12 DIAGNOSIS — R06.09 DOE (DYSPNEA ON EXERTION): ICD-10-CM

## 2022-09-12 DIAGNOSIS — I73.9 PAD (PERIPHERAL ARTERY DISEASE) (HCC): ICD-10-CM

## 2022-09-12 PROCEDURE — 99204 OFFICE O/P NEW MOD 45 MIN: CPT | Performed by: INTERNAL MEDICINE

## 2022-09-12 PROCEDURE — 93000 ELECTROCARDIOGRAM COMPLETE: CPT | Performed by: INTERNAL MEDICINE

## 2022-09-12 RX ORDER — ATORVASTATIN CALCIUM 40 MG/1
40 TABLET, FILM COATED ORAL DAILY
Qty: 30 TABLET | Refills: 3 | Status: SHIPPED | OUTPATIENT
Start: 2022-09-12 | End: 2022-10-19 | Stop reason: SDUPTHER

## 2022-09-12 NOTE — PROGRESS NOTES
2022    MD Monique Camarena [address]  [facility] [address]    CC: Macho Stubbs, APRN-CNP    RE: Monse Carmen RENETTA   : 1970     Dear Dr. Manuel Rivera :    I had the pleasure of seeing your patient, Zoë Raya in the office today on 2022. As you are well aware, Mr. Martine Johnston is a pleasant 45 y.o.  male who was kindly referred to me for evaluation of Severe PAD and infrarenal abdominal aorta mural thrombus with occlusion of bilateral iliac arteries on recent CT. CT images reviewed with Dr. Tho Medina. Currently, he reports lifestyle limiting claudication in right > left leg x 3-4 months. He reports aching, heaviness, numbness in the legs improved within a few minutes of rest. Hx of DVTs right leg in past. He also reports dyspnea with mild degrees of activity. No chest pain. No plpiations, near syncope or syncope. HX tobacco abuse, smoked 2 PPD for > 20 years quit 10 years ago. Still smokes cigars. Past Medical History: He  has a past medical history of Diabetes mellitus ([address] Kidney stone, Pneumonia, Sleep apnea, Syncope and collapse, and Vasovagal syncope. Surgical History: He  has a past surgical history that includes Cataract removal with implant (Bilateral, ) and IR GUIDED THROMB [address] (9/10/2021). Social History: He quit smoking about 10 years ago. His smoking use included cigars and cigarettes. He has a 7.50 pack-year smoking history. He has never used smokeless tobacco. He reports that he does not drink alcohol and does not use drugs. Family History: family history includes Diabetes in his father; Other in his mother.     Current medications:   Current Outpatient Medications   Medication Sig Dispense Refill    rivaroxaban (XARELTO) 20 MG TABS tablet Take 1 tablet by mouth daily (with breakfast) 30 tablet 5    acetaminophen (TYLENOL) 500 MG tablet Take 1,000 mg by mouth every 8 hours as needed      CPAP Machine MISC by Does not apply route 1 PEARL nightly. Follow up in 6 months. Thank you very much for allowing me to participate in Mr. Gallo Bellamy cardiac care. Should you have any questions, please do not hesitate to contact me.      Sincerely,    Miki Rain DO, [facility], [address] [facility]

## 2022-09-23 ENCOUNTER — HOSPITAL ENCOUNTER (OUTPATIENT)
Dept: NUCLEAR MEDICINE | Age: 52
Discharge: HOME | End: 2022-09-25
Payer: COMMERCIAL

## 2022-09-23 ENCOUNTER — HOSPITAL ENCOUNTER (OUTPATIENT)
Dept: NON INVASIVE DIAGNOSTICS | Age: 52
Discharge: HOME | End: 2022-09-23
Payer: COMMERCIAL

## 2022-09-23 DIAGNOSIS — R06.09 DOE (DYSPNEA ON EXERTION): ICD-10-CM

## 2022-09-23 LAB
LV EF: 53 %
LVEF MODALITY: NORMAL

## 2022-09-23 PROCEDURE — A9502 TC99M TETROFOSMIN: HCPCS | Performed by: INTERNAL MEDICINE

## 2022-09-23 PROCEDURE — 93018 CV STRESS TEST I&R ONLY: CPT | Performed by: INTERNAL MEDICINE

## 2022-09-23 PROCEDURE — 2580000003 HC RX 258: Performed by: INTERNAL MEDICINE

## 2022-09-23 PROCEDURE — 78452 HT MUSCLE IMAGE SPECT MULT: CPT

## 2022-09-23 PROCEDURE — 6360000002 HC RX W HCPCS: Performed by: INTERNAL MEDICINE

## 2022-09-23 PROCEDURE — 93017 CV STRESS TEST TRACING ONLY: CPT

## 2022-09-23 PROCEDURE — 3430000000 HC RX DIAGNOSTIC RADIOPHARMACEUTICAL: Performed by: INTERNAL MEDICINE

## 2022-09-23 RX ORDER — SODIUM CHLORIDE 0.9 % (FLUSH) 0.9 %
10 SYRINGE (ML) INJECTION PRN
Status: COMPLETED | OUTPATIENT
Start: 2022-09-23 | End: 2022-09-23

## 2022-09-23 RX ORDER — SODIUM CHLORIDE 0.9 % (FLUSH) 0.9 %
5-40 SYRINGE (ML) INJECTION PRN
Status: DISCONTINUED | OUTPATIENT
Start: 2022-09-23 | End: 2022-09-26 | Stop reason: HOSPADM

## 2022-09-23 RX ADMIN — Medication 10 ML: at 10:43

## 2022-09-23 RX ADMIN — Medication 10 ML: at 10:44

## 2022-09-23 RX ADMIN — TETROFOSMIN 35.5 MILLICURIE: 1.38 INJECTION, POWDER, LYOPHILIZED, FOR SOLUTION INTRAVENOUS at 10:43

## 2022-09-23 RX ADMIN — TETROFOSMIN 11.3 MILLICURIE: 1.38 INJECTION, POWDER, LYOPHILIZED, FOR SOLUTION INTRAVENOUS at 09:10

## 2022-09-23 RX ADMIN — REGADENOSON 0.4 MG: 0.08 INJECTION, SOLUTION INTRAVENOUS at 10:43

## 2022-09-23 RX ADMIN — Medication 10 ML: at 09:10

## 2022-09-23 NOTE — PROGRESS NOTES
Reviewed history, allergies, and medications. Patient took his morning medications prior to testing. Consent confirmed. Lexiscan exam explained. Placed patient on monitor. @1937 Nuclear Medicine tech here to inject Jorge A Burrell. SOB noted during recovery phase. Denied chest pain. No ectopy noted. Doctor to further review and interpret results. Patient off monitor and instructed to eat, will have last part of exam in 1 hour.

## 2022-09-25 DIAGNOSIS — D68.59 HYPERCOAGULABLE STATE (HCC): ICD-10-CM

## 2022-09-26 RX ORDER — RIVAROXABAN 20 MG/1
TABLET, FILM COATED ORAL
Qty: 30 TABLET | Refills: 0 | Status: ON HOLD | OUTPATIENT
Start: 2022-09-26 | End: 2022-09-29 | Stop reason: SDUPTHER

## 2022-09-26 NOTE — TELEPHONE ENCOUNTER
03/10/2022 Rosalee Harris MD Family Medicine Office Visit Hypercoagulable state [facility])     F/u due 9/10/22, spoke to wife, she will give him the message to call back and schedule f/u.

## 2022-09-28 ENCOUNTER — HOSPITAL ENCOUNTER (OUTPATIENT)
Dept: CARDIAC CATH/INVASIVE PROCEDURES | Age: 52
Setting detail: OBSERVATION
Discharge: HOME | End: 2022-09-29
Attending: INTERNAL MEDICINE | Admitting: INTERNAL MEDICINE
Payer: COMMERCIAL

## 2022-09-28 DIAGNOSIS — D68.59 HYPERCOAGULABLE STATE (HCC): ICD-10-CM

## 2022-09-28 PROBLEM — I73.9 CLAUDICATION (HCC): Status: ACTIVE | Noted: 2022-09-28

## 2022-09-28 LAB
ANION GAP SERPL CALCULATED.3IONS-SCNC: 9 MEQ/L (ref 9–15)
BUN BLDV-MCNC: 14 MG/DL (ref 6–20)
CALCIUM SERPL-MCNC: 9.1 MG/DL (ref 8.5–9.9)
CHLORIDE BLD-SCNC: 102 MEQ/L (ref 95–107)
CO2: 29 MEQ/L (ref 20–31)
CREAT SERPL-MCNC: 0.93 MG/DL (ref 0.7–1.2)
GFR AFRICAN AMERICAN: >60
GFR NON-AFRICAN AMERICAN: >60
GLUCOSE BLD-MCNC: 157 MG/DL (ref 70–99)
HCT VFR BLD CALC: 48 % (ref 42–52)
HEMOGLOBIN: 16.4 G/DL (ref 14–18)
MCH RBC QN AUTO: 33.8 PG (ref 27–31.3)
MCHC RBC AUTO-ENTMCNC: 34.2 % (ref 33–37)
MCV RBC AUTO: 98.7 FL (ref 80–100)
PDW BLD-RTO: 13.9 % (ref 11.5–14.5)
PLATELET # BLD: 168 K/UL (ref 130–400)
POTASSIUM SERPL-SCNC: 4.2 MEQ/L (ref 3.4–4.9)
RBC # BLD: 4.86 M/UL (ref 4.7–6.1)
SODIUM BLD-SCNC: 140 MEQ/L (ref 135–144)
WBC # BLD: 9.1 K/UL (ref 4.8–10.8)

## 2022-09-28 PROCEDURE — C1769 GUIDE WIRE: HCPCS

## 2022-09-28 PROCEDURE — 37221 HC ILIAC TERRITORY PLASTY STENT: CPT

## 2022-09-28 PROCEDURE — 37252 INTRVASC US NONCORONARY 1ST: CPT | Performed by: INTERNAL MEDICINE

## 2022-09-28 PROCEDURE — C1725 CATH, TRANSLUMIN NON-LASER: HCPCS

## 2022-09-28 PROCEDURE — C1753 CATH, INTRAVAS ULTRASOUND: HCPCS

## 2022-09-28 PROCEDURE — 37223 PR REVSC OPN/PRQ ILIAC ART W/STNT & ANGIOP IPSILATL: CPT | Performed by: INTERNAL MEDICINE

## 2022-09-28 PROCEDURE — 6370000000 HC RX 637 (ALT 250 FOR IP): Performed by: INTERNAL MEDICINE

## 2022-09-28 PROCEDURE — 6360000002 HC RX W HCPCS: Performed by: INTERNAL MEDICINE

## 2022-09-28 PROCEDURE — 37236 OPEN/PERQ PLACE STENT 1ST: CPT | Performed by: INTERNAL MEDICINE

## 2022-09-28 PROCEDURE — C1894 INTRO/SHEATH, NON-LASER: HCPCS

## 2022-09-28 PROCEDURE — 37221 PR REVSC OPN/PRQ ILIAC ART W/STNT PLMT & ANGIOPLSTY: CPT | Performed by: INTERNAL MEDICINE

## 2022-09-28 PROCEDURE — 6360000002 HC RX W HCPCS

## 2022-09-28 PROCEDURE — 2580000003 HC RX 258

## 2022-09-28 PROCEDURE — 6360000004 HC RX CONTRAST MEDICATION: Performed by: INTERNAL MEDICINE

## 2022-09-28 PROCEDURE — 6370000000 HC RX 637 (ALT 250 FOR IP)

## 2022-09-28 PROCEDURE — 85027 COMPLETE CBC AUTOMATED: CPT

## 2022-09-28 PROCEDURE — G0378 HOSPITAL OBSERVATION PER HR: HCPCS

## 2022-09-28 PROCEDURE — 2500000003 HC RX 250 WO HCPCS

## 2022-09-28 PROCEDURE — C1874 STENT, COATED/COV W/DEL SYS: HCPCS

## 2022-09-28 PROCEDURE — 37252 INTRVASC US NONCORONARY 1ST: CPT

## 2022-09-28 PROCEDURE — 99153 MOD SED SAME PHYS/QHP EA: CPT

## 2022-09-28 PROCEDURE — 99152 MOD SED SAME PHYS/QHP 5/>YRS: CPT | Performed by: INTERNAL MEDICINE

## 2022-09-28 PROCEDURE — 37236 OPEN/PERQ PLACE STENT 1ST: CPT

## 2022-09-28 PROCEDURE — 37253 INTRVASC US NONCORONARY ADDL: CPT

## 2022-09-28 PROCEDURE — 80048 BASIC METABOLIC PNL TOTAL CA: CPT

## 2022-09-28 PROCEDURE — 93005 ELECTROCARDIOGRAM TRACING: CPT | Performed by: INTERNAL MEDICINE

## 2022-09-28 PROCEDURE — 99152 MOD SED SAME PHYS/QHP 5/>YRS: CPT

## 2022-09-28 PROCEDURE — C1887 CATHETER, GUIDING: HCPCS

## 2022-09-28 PROCEDURE — 75716 ARTERY X-RAYS ARMS/LEGS: CPT

## 2022-09-28 PROCEDURE — 2580000003 HC RX 258: Performed by: INTERNAL MEDICINE

## 2022-09-28 PROCEDURE — 37253 INTRVASC US NONCORONARY ADDL: CPT | Performed by: INTERNAL MEDICINE

## 2022-09-28 PROCEDURE — 2709999900 HC NON-CHARGEABLE SUPPLY

## 2022-09-28 RX ORDER — FENTANYL CITRATE 50 UG/ML
25 INJECTION, SOLUTION INTRAMUSCULAR; INTRAVENOUS
Status: COMPLETED | OUTPATIENT
Start: 2022-09-28 | End: 2022-09-28

## 2022-09-28 RX ORDER — ASPIRIN 81 MG/1
324 TABLET, CHEWABLE ORAL ONCE
Status: COMPLETED | OUTPATIENT
Start: 2022-09-28 | End: 2022-09-28

## 2022-09-28 RX ORDER — LORAZEPAM 0.5 MG/1
0.5 TABLET ORAL
Status: DISCONTINUED | OUTPATIENT
Start: 2022-09-28 | End: 2022-09-29 | Stop reason: HOSPADM

## 2022-09-28 RX ORDER — LABETALOL HYDROCHLORIDE 5 MG/ML
10 INJECTION, SOLUTION INTRAVENOUS EVERY 30 MIN PRN
Status: DISCONTINUED | OUTPATIENT
Start: 2022-09-28 | End: 2022-09-29 | Stop reason: HOSPADM

## 2022-09-28 RX ORDER — CLOPIDOGREL BISULFATE 75 MG/1
75 TABLET ORAL DAILY
Status: DISCONTINUED | OUTPATIENT
Start: 2022-09-29 | End: 2022-09-29 | Stop reason: HOSPADM

## 2022-09-28 RX ORDER — MIDAZOLAM HYDROCHLORIDE 2 MG/2ML
2 INJECTION, SOLUTION INTRAMUSCULAR; INTRAVENOUS
Status: DISCONTINUED | OUTPATIENT
Start: 2022-09-28 | End: 2022-09-29 | Stop reason: HOSPADM

## 2022-09-28 RX ORDER — ACETAMINOPHEN 325 MG/1
650 TABLET ORAL EVERY 4 HOURS PRN
Status: DISCONTINUED | OUTPATIENT
Start: 2022-09-28 | End: 2022-09-29 | Stop reason: HOSPADM

## 2022-09-28 RX ORDER — CLOPIDOGREL 300 MG/1
300 TABLET, FILM COATED ORAL ONCE
Status: COMPLETED | OUTPATIENT
Start: 2022-09-28 | End: 2022-09-28

## 2022-09-28 RX ORDER — ATORVASTATIN CALCIUM 40 MG/1
40 TABLET, FILM COATED ORAL NIGHTLY
Status: DISCONTINUED | OUTPATIENT
Start: 2022-09-28 | End: 2022-09-29 | Stop reason: HOSPADM

## 2022-09-28 RX ORDER — SODIUM CHLORIDE 9 MG/ML
INJECTION, SOLUTION INTRAVENOUS CONTINUOUS
Status: DISCONTINUED | OUTPATIENT
Start: 2022-09-28 | End: 2022-09-29 | Stop reason: HOSPADM

## 2022-09-28 RX ORDER — SODIUM CHLORIDE 0.9 % (FLUSH) 0.9 %
5-40 SYRINGE (ML) INJECTION EVERY 12 HOURS SCHEDULED
Status: DISCONTINUED | OUTPATIENT
Start: 2022-09-28 | End: 2022-09-29 | Stop reason: HOSPADM

## 2022-09-28 RX ORDER — ASPIRIN 81 MG/1
81 TABLET ORAL DAILY
Status: DISCONTINUED | OUTPATIENT
Start: 2022-09-29 | End: 2022-09-29 | Stop reason: HOSPADM

## 2022-09-28 RX ORDER — NITROGLYCERIN 0.4 MG/1
0.4 TABLET SUBLINGUAL EVERY 5 MIN PRN
Status: DISCONTINUED | OUTPATIENT
Start: 2022-09-28 | End: 2022-09-29 | Stop reason: HOSPADM

## 2022-09-28 RX ORDER — PREDNISONE 50 MG/1
50 TABLET ORAL ONCE
Status: DISCONTINUED | OUTPATIENT
Start: 2022-09-28 | End: 2022-09-29 | Stop reason: HOSPADM

## 2022-09-28 RX ORDER — SODIUM CHLORIDE 9 MG/ML
INJECTION, SOLUTION INTRAVENOUS PRN
Status: DISCONTINUED | OUTPATIENT
Start: 2022-09-28 | End: 2022-09-29 | Stop reason: HOSPADM

## 2022-09-28 RX ORDER — ASPIRIN 81 MG/1
81 TABLET ORAL ONCE
Status: DISCONTINUED | OUTPATIENT
Start: 2022-09-28 | End: 2022-09-29 | Stop reason: HOSPADM

## 2022-09-28 RX ORDER — HYDRALAZINE HYDROCHLORIDE 20 MG/ML
10 INJECTION INTRAMUSCULAR; INTRAVENOUS EVERY 10 MIN PRN
Status: DISCONTINUED | OUTPATIENT
Start: 2022-09-28 | End: 2022-09-29 | Stop reason: HOSPADM

## 2022-09-28 RX ORDER — ONDANSETRON 2 MG/ML
4 INJECTION INTRAMUSCULAR; INTRAVENOUS EVERY 6 HOURS PRN
Status: DISCONTINUED | OUTPATIENT
Start: 2022-09-28 | End: 2022-09-29 | Stop reason: HOSPADM

## 2022-09-28 RX ORDER — SODIUM CHLORIDE 0.9 % (FLUSH) 0.9 %
5-40 SYRINGE (ML) INJECTION PRN
Status: DISCONTINUED | OUTPATIENT
Start: 2022-09-28 | End: 2022-09-29 | Stop reason: HOSPADM

## 2022-09-28 RX ORDER — DIPHENHYDRAMINE HCL 25 MG
50 TABLET ORAL ONCE
Status: DISCONTINUED | OUTPATIENT
Start: 2022-09-28 | End: 2022-09-29 | Stop reason: HOSPADM

## 2022-09-28 RX ORDER — MORPHINE SULFATE 2 MG/ML
2 INJECTION, SOLUTION INTRAMUSCULAR; INTRAVENOUS EVERY 4 HOURS PRN
Status: DISCONTINUED | OUTPATIENT
Start: 2022-09-28 | End: 2022-09-29 | Stop reason: HOSPADM

## 2022-09-28 RX ADMIN — FENTANYL CITRATE 25 MCG: 50 INJECTION, SOLUTION INTRAMUSCULAR; INTRAVENOUS at 16:25

## 2022-09-28 RX ADMIN — SODIUM CHLORIDE, PRESERVATIVE FREE 10 ML: 5 INJECTION INTRAVENOUS at 18:48

## 2022-09-28 RX ADMIN — ONDANSETRON 4 MG: 2 INJECTION INTRAMUSCULAR; INTRAVENOUS at 16:47

## 2022-09-28 RX ADMIN — IOPAMIDOL 250 ML: 612 INJECTION, SOLUTION INTRAVENOUS at 14:19

## 2022-09-28 RX ADMIN — ATORVASTATIN CALCIUM 40 MG: 40 TABLET, FILM COATED ORAL at 21:19

## 2022-09-28 RX ADMIN — SODIUM CHLORIDE: 9 INJECTION, SOLUTION INTRAVENOUS at 18:48

## 2022-09-28 RX ADMIN — SODIUM CHLORIDE, PRESERVATIVE FREE 10 ML: 5 INJECTION INTRAVENOUS at 21:19

## 2022-09-28 RX ADMIN — CLOPIDOGREL BISULFATE 300 MG: 300 TABLET, FILM COATED ORAL at 14:33

## 2022-09-28 RX ADMIN — MORPHINE SULFATE 2 MG: 2 INJECTION, SOLUTION INTRAMUSCULAR; INTRAVENOUS at 21:18

## 2022-09-28 RX ADMIN — ASPIRIN 81 MG 324 MG: 81 TABLET ORAL at 14:33

## 2022-09-28 RX ADMIN — MORPHINE SULFATE 2 MG: 2 INJECTION, SOLUTION INTRAMUSCULAR; INTRAVENOUS at 15:45

## 2022-09-28 ASSESSMENT — PAIN SCALES - GENERAL
PAINLEVEL_OUTOF10: 5
PAINLEVEL_OUTOF10: 4
PAINLEVEL_OUTOF10: 5

## 2022-09-28 ASSESSMENT — PAIN DESCRIPTION - LOCATION
LOCATION: BACK
LOCATION: BACK

## 2022-09-28 ASSESSMENT — PAIN DESCRIPTION - DESCRIPTORS: DESCRIPTORS: ACHING

## 2022-09-28 NOTE — FLOWSHEET NOTE
Patient arrived to the floor from cath lab. Alert and oriented X 4. Vital signs stable. Bilateral groin puncture sites soft, dressings clean dry and intact. Bilateral Pedal pulses palpable 1+. Patient oriented to room and call light. Wife at bedside. Patient aware to not bend bilateral legs and bed rest is not up until 1210 am.Electronically signed by Orlando Franz.  Rigo Jarvis on 9/28/2022 at 6:51 PM

## 2022-09-28 NOTE — PROGRESS NOTES
Arrived to pre/post cath from home, alert and oriented. Vital signs obtained. Consent for procedure signed. Prepping pt for procedure. 1445: Returned from cath lab, alert and oriented. Patient has c/o back pain, Dr. Louis Hernandez made aware. Bilateral FA sheaths intact with pressure bags attached, no bleeding or hematoma. VSS, resting comfortably. 1500: ACT drawn. ACT result is 208. Sheaths remain intact, no bleeding or hematoma. 1615: ACT drawn. ACT result is 161.     1630: L FA sheath removed by DIMITRI Narvaez, quik clot and manual pressure applied for 20 minutes. Hemostasis at 1650. Tegaderm dressing applied. L groin is soft, no bleeding or hematoma. BP dropped during sheath pull to 68/48. 250cc bolus given, zofran given. BP returned to normal quickly. Vital signs are stable now. 1635: R FA sheath removed by Lucio Jaquez RN, quik clot and manual pressure held for 35 min. Hemostasis at 1710. Tegaderm dressing applied. R groin is soft, no bleeding or hematoma. VSS. 1730: R and L groins remain stable, no bleeding or hematoma. 1800: Report given to Deny Borges RN.  Transport requested for pt to transfer to [address] [place] [address].

## 2022-09-29 VITALS
DIASTOLIC BLOOD PRESSURE: 64 MMHG | WEIGHT: 288 LBS | BODY MASS INDEX: 39.01 KG/M2 | SYSTOLIC BLOOD PRESSURE: 98 MMHG | TEMPERATURE: 98.1 F | RESPIRATION RATE: 18 BRPM | HEIGHT: 72 IN | OXYGEN SATURATION: 92 % | HEART RATE: 91 BPM

## 2022-09-29 LAB
ANION GAP SERPL CALCULATED.3IONS-SCNC: 8 MEQ/L (ref 9–15)
BUN BLDV-MCNC: 17 MG/DL (ref 6–20)
CALCIUM SERPL-MCNC: 8.8 MG/DL (ref 8.5–9.9)
CHLORIDE BLD-SCNC: 105 MEQ/L (ref 95–107)
CO2: 29 MEQ/L (ref 20–31)
CREAT SERPL-MCNC: 1.22 MG/DL (ref 0.7–1.2)
EKG ATRIAL RATE: 62 BPM
EKG P AXIS: 67 DEGREES
EKG P-R INTERVAL: 190 MS
EKG Q-T INTERVAL: 394 MS
EKG QRS DURATION: 88 MS
EKG QTC CALCULATION (BAZETT): 399 MS
EKG R AXIS: -53 DEGREES
EKG T AXIS: 52 DEGREES
EKG VENTRICULAR RATE: 62 BPM
GFR AFRICAN AMERICAN: >60
GFR NON-AFRICAN AMERICAN: >60
GLUCOSE BLD-MCNC: 154 MG/DL (ref 70–99)
HCT VFR BLD CALC: 43.4 % (ref 42–52)
HEMOGLOBIN: 14.4 G/DL (ref 14–18)
MCH RBC QN AUTO: 33.1 PG (ref 27–31.3)
MCHC RBC AUTO-ENTMCNC: 33.1 % (ref 33–37)
MCV RBC AUTO: 100.1 FL (ref 80–100)
PDW BLD-RTO: 13.5 % (ref 11.5–14.5)
PLATELET # BLD: 136 K/UL (ref 130–400)
POTASSIUM SERPL-SCNC: 4.6 MEQ/L (ref 3.4–4.9)
RBC # BLD: 4.34 M/UL (ref 4.7–6.1)
SODIUM BLD-SCNC: 142 MEQ/L (ref 135–144)
WBC # BLD: 13.1 K/UL (ref 4.8–10.8)

## 2022-09-29 PROCEDURE — 6370000000 HC RX 637 (ALT 250 FOR IP): Performed by: INTERNAL MEDICINE

## 2022-09-29 PROCEDURE — G0378 HOSPITAL OBSERVATION PER HR: HCPCS

## 2022-09-29 PROCEDURE — 85027 COMPLETE CBC AUTOMATED: CPT

## 2022-09-29 PROCEDURE — 80048 BASIC METABOLIC PNL TOTAL CA: CPT

## 2022-09-29 PROCEDURE — 93010 ELECTROCARDIOGRAM REPORT: CPT | Performed by: INTERNAL MEDICINE

## 2022-09-29 PROCEDURE — 99217 PR OBSERVATION CARE DISCHARGE MANAGEMENT: CPT | Performed by: INTERNAL MEDICINE

## 2022-09-29 PROCEDURE — 36415 COLL VENOUS BLD VENIPUNCTURE: CPT

## 2022-09-29 RX ORDER — CLOPIDOGREL BISULFATE 75 MG/1
75 TABLET ORAL DAILY
Qty: 90 TABLET | Refills: 3 | Status: SHIPPED | OUTPATIENT
Start: 2022-09-29

## 2022-09-29 RX ADMIN — CLOPIDOGREL BISULFATE 75 MG: 75 TABLET ORAL at 07:53

## 2022-09-29 RX ADMIN — ASPIRIN 81 MG: 81 TABLET, COATED ORAL at 07:53

## 2022-09-29 ASSESSMENT — PAIN DESCRIPTION - LOCATION: LOCATION: BACK

## 2022-09-29 ASSESSMENT — PAIN SCALES - GENERAL: PAINLEVEL_OUTOF10: 1

## 2022-09-29 NOTE — DISCHARGE INSTR - ACTIVITY
Follow post peripheral angioplasty instructions. Avoid frequent bending at the waist. No strenuous activity until cleared by  the doctor. No lifting anything heavier than five pounds for 3 days.

## 2022-09-29 NOTE — FLOWSHEET NOTE
2100- patient alert and oriented times 3. VSS. Room air. Bilateral groin sites are wnl/ pulses good 2+. Bed low, locked, call light in reach.

## 2022-09-29 NOTE — FLOWSHEET NOTE
5289- Discharge instructions given to patient who verbalized understanding. Bilateral groin sites post peripheral catheterization soft, no hematoma noted but some bruising on the right groin seen. Patient states his wife is ion her way to pick him up. Electronically signed by Handy Plaza on 9/29/2022 at 8:17 AM

## 2022-09-29 NOTE — DISCHARGE SUMMARY
Discharge Summary    Date: 9/29/2022  Patient Name: Julio Cesar Singleton II    YOB: 1970     Age: 45 y.o. Admit Date: 9/28/2022  Discharge Date: 9/29/2022  Discharge Condition: Good    Admission Diagnosis  Claudication [facility]) [I73.9]      Discharge Diagnosis  Active Problems:    Claudication [facility])  Resolved Problems:    * No resolved hospital problems. [facility] Stay  Narrative of Hospital Course:  Patient presented for elective lower extremity angiogram.  He was noted to have severe distal abdominal aorta stenosis as well as bilateral common and external iliac artery disease/stenosis. Underwent covered stent placement of the infrarenal abdominal aorta as well as bilateral iliac bifurcations with 5 total covered stents. Patient's symptoms improved and had good distal pulses. He was maintained on Xarelto. Plavix was added as well. No aspirin to avoid triple therapy. He remained on statin. He was discharged home in stable and satisfactory condition. Consultants:  None    Surgeries/procedures Performed:  Lower extremity angiogram     Treatments:    Cardiac Medications        Discharge Plan/Disposition:  Home    Hospital/Incidental Findings Requiring Follow Up:    Patient Instructions:    Diet: Cardiac Diet    Activity:Activity as Tolerated  For number of days (if applicable): Other Instructions: No heavy lifting for the next 5 days more than 10 pounds    Provider Follow-Up:   No follow-ups on file.      Significant Diagnostic Studies:    Recent Labs:  Admission on 09/28/2022  WBC                                           Date: 09/28/2022  Value: 9.1         Ref range: 4.8 - 10.8 K/uL    Status: Final  RBC                                           Date: 09/28/2022  Value: 4.86        Ref range: 4.70 - 6.10 M/uL   Status: Final  Hemoglobin                                    Date: 09/28/2022  Value: 16.4        Ref range: 14.0 - 18.0 g/dL   Status: Final  Hematocrit Date: 09/28/2022  Value: 48.0        Ref range: 42.0 - 52.0 %      Status: Final  MCV                                           Date: 09/28/2022  Value: 98.7        Ref range: 80.0 - 100.0 fL    Status: Final  MCH                                           Date: 09/28/2022  Value: 33.8 (A)    Ref range: 27.0 - 31.3 pg     Status: Final  MCHC                                          Date: 09/28/2022  Value: 34.2        Ref range: 33.0 - 37.0 %      Status: Final  RDW                                           Date: 09/28/2022  Value: 13.9        Ref range: 11.5 - 14.5 %      Status: Final  Platelets                                     Date: 09/28/2022  Value: 168         Ref range: 130 - 400 K/uL     Status: Final  Sodium                                        Date: 09/28/2022  Value: 140         Ref range: 135 - 144 mEq/L    Status: Final  Potassium                                     Date: 09/28/2022  Value: 4.2         Ref range: 3.4 - 4.9 mEq/L    Status: Final  Chloride                                      Date: 09/28/2022  Value: 102         Ref range: 95 - 107 mEq/L     Status: Final  CO2                                           Date: 09/28/2022  Value: 29          Ref range: 20 - 31 mEq/L      Status: Final  Anion Gap                                     Date: 09/28/2022  Value: 9           Ref range: 9 - 15 mEq/L       Status: Final  Glucose                                       Date: 09/28/2022  Value: 157 (A)     Ref range: 70 - 99 mg/dL      Status: Final  BUN                                           Date: 09/28/2022  Value: 14          Ref range: 6 - 20 mg/dL       Status: Final  Creatinine                                    Date: 09/28/2022  Value: 0.93        Ref range: 0.70 - 1.20 mg/dL  Status: Final  GFR Non-                      Date: 09/28/2022  Value: >60.0       Ref range: >60                Status: Final                Comment: >60 mL/min/1.73m2 EGFR, calc. for ages 25 and older using the  MDRD formula (not corrected for weight), is valid for stable  renal function. GFR                           Date: 09/28/2022  Value: >60.0       Ref range: >60                Status: Final                Comment: >60 mL/min/1.73m2 EGFR, calc. for ages 25 and older using the  MDRD formula (not corrected for weight), is valid for stable  renal function.     Calcium                                       Date: 09/28/2022  Value: 9.1         Ref range: 8.5 - 9.9 mg/dL    Status: Final  Ventricular Rate                              Date: 09/28/2022  Value: 62          Ref range: BPM                Status: Preliminary  Atrial Rate                                   Date: 09/28/2022  Value: 62          Ref range: BPM                Status: Preliminary  P-R Interval                                  Date: 09/28/2022  Value: 190         Ref range: ms                 Status: Preliminary  QRS Duration                                  Date: 09/28/2022  Value: 88          Ref range: ms                 Status: Preliminary  Q-T Interval                                  Date: 09/28/2022  Value: 394         Ref range: ms                 Status: Preliminary  QTc Calculation (Bazett)                      Date: 09/28/2022  Value: 399         Ref range: ms                 Status: Preliminary  P Axis                                        Date: 09/28/2022  Value: 67          Ref range: degrees            Status: Preliminary  R Axis                                        Date: 09/28/2022  Value: -53         Ref range: degrees            Status: Preliminary  T Axis                                        Date: 09/28/2022  Value: 52          Ref range: degrees            Status: Preliminary  ------------    Radiology last 7 days:  NM MYOCARDIAL SPECT REST EXERCISE OR RX    Result Date: 9/23/2022  LARGE DIAPHRAGMATIC ATTENUATION ARTIFACT INVOLVING THE ENTIRE INFEROLATERAL APICAL SEGMENT WHICH MAKES IT APPEAR AS THOUGH THERE IS A PRIOR INFARCTION. CLINICAL CORRELATION IS RECOMMENDED. LEFT VENTRICULAR EJECTION FRACTION IS PRESERVED. THERE IS NO EVIDENCE OF ANY REVERSIBLE ISCHEMIA. Pending Labs     Order Current Status    Basic Metabolic Panel In process        Discharge Medications    Current Discharge Medication List    START taking these medications    clopidogrel (PLAVIX) 75 MG tablet  Take 1 tablet by mouth daily  Qty: 90 tablet Refills: 3          Current Discharge Medication List    CONTINUE these medications which have CHANGED    rivaroxaban (XARELTO) 20 MG TABS tablet  take 1 tablet by mouth once daily WITH BREAKFAST  Qty: 90 tablet Refills: 3  Associated Diagnoses:Hypercoagulable state ([address]          Current Discharge Medication List    CONTINUE these medications which have NOT CHANGED    atorvastatin (LIPITOR) 40 MG tablet  Take 1 tablet by mouth daily  Qty: 30 tablet Refills: 3    acetaminophen (TYLENOL) 500 MG tablet  Take 1,000 mg by mouth every 8 hours as needed    CPAP Machine MISC  by Does not apply route  Qty: 1 each Refills: 0  Associated Diagnoses:Obstructive sleep apnea syndrome          Current Discharge Medication List        Time Spent on Discharge:  minutes were spent in patient examination, evaluation, counseling as well as medication reconciliation, prescriptions for required medications, discharge plan, and follow up.     Electronically signed by Anshu Sandhu DO on 9/29/22 at 6:00 AM EDT

## 2022-09-29 NOTE — DISCHARGE INSTR - DIET
Good nutrition is important when healing from an illness, injury, or surgery. Follow any nutrition recommendations given to you during your hospital stay. If you were given an oral nutrition supplement while in the hospital, continue to take this supplement at home. You can take it with meals, in-between meals, and/or before bedtime. These supplements can be purchased at most local grocery stores, pharmacies, and chain [employer]-stores. If you have any questions about your diet or nutrition, call the hospital and ask for the dietitian.     ** Heart healthy, low sodium**

## 2022-09-29 NOTE — PLAN OF CARE
Problem: Chronic Conditions and Co-morbidities  Goal: Patient's chronic conditions and co-morbidity symptoms are monitored and maintained or improved  Outcome: Progressing  Flowsheets (Taken 9/28/2022 2100)  Care Plan - Patient's Chronic Conditions and Co-Morbidity Symptoms are Monitored and Maintained or Improved: Monitor and assess patient's chronic conditions and comorbid symptoms for stability, deterioration, or improvement     Problem: Discharge Planning  Goal: Discharge to home or other facility with appropriate resources  Outcome: Progressing  Flowsheets (Taken 9/28/2022 2100)  Discharge to home or other facility with appropriate resources:   Identify barriers to discharge with patient and caregiver   Identify discharge learning needs (meds, wound care, etc)     Problem: Pain  Goal: Verbalizes/displays adequate comfort level or baseline comfort level  Outcome: Progressing

## 2022-09-29 NOTE — PLAN OF CARE
[facility] Telemetry  [address] [address]  Phone: Crystal Mckoy [facility] LAB  1        September 29, 2022     Patient: Justice Valencia II   YOB: 1970   Date of Visit: 9/28/2022       To Whom It May Concern: It is my medical opinion that Destin Sawyer may return to work on October 3, 2022 without any restrictions for. If you have any questions or concerns, please don't hesitate to call.     Sincerely,        Electronically signed by Leonidas Swan DO on 9/29/2022 at 6:06 AM

## 2022-10-02 NOTE — PROCEDURES
[address]                      [address], [address] [facility]                                 PROCEDURE NOTE    PATIENT NAME: Kaur Magallanes                   :        1970  MED REC NO:   [id number]                            ROOM:       [id number]  ACCOUNT NO:   [de-identified]                           ADMIT DATE: 2022  PROVIDER:     Donavan Acuña DO    DATE OF PROCEDURE:  2022    PROCEDURES PERFORMED:  Suprarenal abdominal aortogram, mid to distal  abdominal aorta stenting, bilateral common iliac artery and external  iliac artery stenting, intravascular ultrasound of aorta and bilateral  iliac arteries. PROCEDURE PERFORMED BY:  Dave Acuña DO    INDICATIONS:  Claudication and abnormal noninvasive testing. COMPLICATIONS:  None. ACCESS SITE:  Bilateral common femoral arteries. DESCRIPTION OF PROCEDURE:  The patient was brought to the cardiac cath  lab suite, where he was sterilely prepped and draped in usual fashion. 1% lidocaine was used to anesthetize the bilateral inguinal areas and a  4-[place] arterial sheath was placed without any difficulty. Moderate IV conscious sedation was given with Versed and fentanyl from  1129 to 1425 under my direct supervision as well as circulating  RN/independent trained observer. From the left common femoral artery sheath, a catheter was placed over  the wire into the suprarenal abdominal aorta and abdominal aortogram was  performed with bilateral iliac angiography under DSA. Next, utilizing a  0.035 support catheter and 0.035 support wires, the right common iliac  artery.  was able to be traversed retrograde from the right common  femoral artery and therefore another 0.035 wire was placed in the  ascending thoracic aorta. Next, PTCA was performed of the right common  iliac artery and right external iliac artery per usual technique without  any difficulty.   Please see nursing notes for details of balloon  utilized and atmospheres achieved. Next, intravascular ultrasound was performed of the abdominal aorta as  well as bilateral iliac arteries. Next, a VIABAHN 11 x 80 mm covered  stent was deployed in the infrarenal abdominal aorta. Next, two 8 x 59  were deployed from the abdominal aorta through the stent into the  bilateral common iliac artery and another stent was also covered from  the common iliac artery down into the mid external iliac arteries and  all stents were post-dilated. Please see nursing notes for details of  balloon utilized and atmospheres achieved. Repeat intravascular  ultrasound was performed showed excellent angiographic results with good  stent expansion and opposition. Next, diagnostic catheter was _____ in the suprarenal abdominal aorta  and again abdominal aortogram with bilateral iliac angiography was  performed. Next, a right lower extremity angiogram as well as left  lower extremity angiogram with runoff was performed through the common  femoral artery sheath. Next, the sheath was left in place and the  patient was transferred to the postcath holding area in stable and  satisfactory condition. FINDINGS:  1. Abdominal aortogram shows a 50-60% infrarenal stenosis as well as  distal aorta 70% stenosis. 2.  Right lower extremity:  The right common iliac artery has a 100%  occlusion in the ostial segment and reconstitutes in the external iliac  artery. External iliac artery has an 80% stenosis. Right common  femoral artery, profunda, SFA and popliteal artery are patent with mild  diffuse disease. 3.  Left lower extremity:  The left common iliac artery has a 90-95%  stenosis and left external iliac artery has 70%. The left common  femoral artery, SFA. profunda, popliteal artery are patent with mild  diffuse disease. 4.  Intravascular shows a significant episodic disease in the abdominal  aorta as well as bilateral iliac artery showing severe stenosis. Post-stenting either shows excellent stent expansion and opposition. ASSESSMENT:  1. Successful abdominal aorta stenting with VIABAHN stents with 0%  residual stenosis. 2.  Status post bilateral common as well as external iliac artery  stenting with VIABAHN covered stents with 0% residual stenosis. PLAN:  1. Dual antiplatelet therapy. 2.  Risk factor modification. 3.  Maximize medical therapy. 4.  Resume oral anticoagulation for patient's home dose.         Marisa Zaidi DO    D: 10/02/2022 15:12:01       T: 10/02/2022 16:31:55     [email or web address]  Job#: [id number]     Doc#: [id number]    CC:

## 2022-10-05 ENCOUNTER — OFFICE VISIT (OUTPATIENT)
Dept: CARDIOLOGY CLINIC | Age: 52
End: 2022-10-05
Payer: COMMERCIAL

## 2022-10-05 VITALS
HEIGHT: 72 IN | WEIGHT: 280.4 LBS | RESPIRATION RATE: 14 BRPM | OXYGEN SATURATION: 96 % | BODY MASS INDEX: 37.98 KG/M2 | DIASTOLIC BLOOD PRESSURE: 86 MMHG | HEART RATE: 88 BPM | SYSTOLIC BLOOD PRESSURE: 134 MMHG

## 2022-10-05 DIAGNOSIS — I82.411 ACUTE DEEP VEIN THROMBOSIS (DVT) OF RIGHT FEMORAL VEIN (HCC): Primary | ICD-10-CM

## 2022-10-05 DIAGNOSIS — R94.39 ABNORMAL STRESS TEST: ICD-10-CM

## 2022-10-05 PROCEDURE — 93000 ELECTROCARDIOGRAM COMPLETE: CPT | Performed by: INTERNAL MEDICINE

## 2022-10-05 PROCEDURE — 99214 OFFICE O/P EST MOD 30 MIN: CPT | Performed by: INTERNAL MEDICINE

## 2022-10-05 RX ORDER — ISOSORBIDE MONONITRATE 30 MG/1
30 TABLET, EXTENDED RELEASE ORAL DAILY
Qty: 30 TABLET | Refills: 3 | Status: ON HOLD | OUTPATIENT
Start: 2022-10-05 | End: 2022-10-20 | Stop reason: HOSPADM

## 2022-10-05 RX ORDER — ATENOLOL 25 MG/1
25 TABLET ORAL DAILY
Qty: 30 TABLET | Refills: 6 | Status: SHIPPED | OUTPATIENT
Start: 2022-10-05

## 2022-10-05 NOTE — PROGRESS NOTES
10/5/2022    MD Monique Velasco [address]  [facility] [address]    RE: Rip Lauren II   : 1970     Dear Dr. Warner Bauer :    I had the pleasure of seeing your patient, Zoila Weiss in the office today on 10/5/2022. As you are well aware, Mr. Zulema Nelson is a pleasant 47 y.o.  male who returns today fo follow up of Severe PAD. His recent abdominal aortic angiogram revealed severe distal abdominal aorta stenosis as well as bilateral common and external iliac artery disease/stenosis. He underwent covered stent placement of the infrarenal abdominal aorta as well as bilateral iliac bifurcations with 5 total covered stentst.,  Currently, reports his claudication has significantly improved. He still also reports dyspnea with mild degrees of activity. No chest pain. No plpiations, near syncope or syncope. Recent stress test showed large fixed defect involving the entire inferolateral and apical wall most suggestive of prior infarction. HX tobacco abuse, smoked 2 PPD for > 20 years quit 10 years ago. Still smokes cigars. Current medications:   Current Outpatient Medications   Medication Sig Dispense Refill    clopidogrel (PLAVIX) 75 MG tablet Take 1 tablet by mouth daily 90 tablet 3    rivaroxaban (XARELTO) 20 MG TABS tablet take 1 tablet by mouth once daily WITH BREAKFAST 90 tablet 3    atorvastatin (LIPITOR) 40 MG tablet Take 1 tablet by mouth daily 30 tablet 3    acetaminophen (TYLENOL) 500 MG tablet Take 1,000 mg by mouth every 8 hours as needed      CPAP Machine MISC by Does not apply route 1 each 0     No current facility-administered medications for this visit. Allergies: Patient has no known allergies. Review of Systems   General: Fatigues easily. Cardiovascular: See HPI. No orthopnea or PND. Respiratory: Dyspnea is present with mild degrees of activity. + OLVIN, wears CPAP  Gastrointestinal: No melena or hematochezia. Genitourinary: No hematuria. Hematological: No easy bruising or bleeding on Xarelto. Vascular: No lower extremity edema . No further claudication. Neurological: No TIA or CVA symptoms. + paresthesias. Musculoskeletal: No chest wall pain. Psychiatric: No anxiety. *All other systems were reviewed and found to be negative unless otherwise noted in the HPI*    Physical Examination: His  height is 6' (1.829 m) and weight is 280 lb 6.4 oz (127.2 kg). His blood pressure is 134/86 and his pulse is 88. His respiration is 14 and oxygen saturation is 96%. He is alert and oriented to person, place, and time. No distress. Head is atraumatic. Moist mucous membranes. Neck is supple without JVD or carotid bruits. No thyroidmegaly. Lungs are clear to auscultation both anteriorly and posteriorly. No accessory muscle usage. Heart is a regular rate and rhythm. No murmurs. No S3 or S4. PMI is nondisplaced. Abdomen is soft and non tender. No hepatosplenomegaly. No abdominal aortic bruits or masses. Lower extremities with mild pitting edema and chronic venous changes. No clubbing or cyanosis. Neurologically, cranial nerves are grossly intact. No focal sensory and/or motor deficits. Skin is intact without rash or lesions. ECG (10/5/2022): Sinus rhythm HR 83 bpm. LAFB. Poor R wave progression. Lexiscan Myoview stress test (9/23/2022): LARGE DIAPHRAGMATIC ATTENUATION ARTIFACT INVOLVING THE ENTIRE INFEROLATERAL APICAL SEGMENT WHICH MAKES IT APPEAR AS THOUGH THERE IS A PRIOR INFARCTION. CLINICAL CORRELATION IS RECOMMENDED. IMPRESSION:  Functional class III (mild activity) dyspnea on exertion, possible anginal equivalent. Abnormal stress test suggesting inferior wall MI. Severe PAD, status post PCI/stents to the distal abdominal aorta and bilateral iliac arteries.    Hypertension  DM  OLVIN on CPAP  Hx recurrent venous thromboembolism, on Xarelto      RECOMMENDATIONS:  Given his history, risk factors, symptoms and abnormal stress test, he is recommended cardiac catheterization to definitively evaluate the coronary anatomy and guide possible vascularization options. I am concerned given his large vessel CAD, he has significant CAD as well. Risk and benefits of cardiac catheterization were discussed in detail including the risk of potentially serious Endor (bleeding, infection, MI, CVA, and renal failure. He is agreeable to proceeding. In the meantime, start Imdur 30 mg daily and atenolol 25 mg daily for antianginal effects. He is agreeable to the plan. Follow-up cardiac evaluation in 3 months, possibly sooner pending results of cardiac catheterization. Thank you very much for allowing me to participate in Mr. Rika Villanueva cardiac care. Should you have any questions, please do not hesitate to contact me.      Sincerely,    Israel Rodríguez DO, [facility] [place], [address] [facility]

## 2022-10-17 NOTE — TELEPHONE ENCOUNTER
Pt now has to use [facility] pharmacy instead of [facility]. Requesting refills of Atorvastatin to [facility] in [facility].      Rx pending. Pt last seen 10/5/22 with Dr. Justin Muhammad.

## 2022-10-19 RX ORDER — ATORVASTATIN CALCIUM 40 MG/1
40 TABLET, FILM COATED ORAL DAILY
Qty: 30 TABLET | Refills: 3 | Status: SHIPPED | OUTPATIENT
Start: 2022-10-19

## 2022-10-20 ENCOUNTER — HOSPITAL ENCOUNTER (OUTPATIENT)
Dept: CARDIAC CATH/INVASIVE PROCEDURES | Age: 52
Discharge: HOME | End: 2022-10-20
Attending: INTERNAL MEDICINE | Admitting: INTERNAL MEDICINE
Payer: COMMERCIAL

## 2022-10-20 VITALS
RESPIRATION RATE: 16 BRPM | SYSTOLIC BLOOD PRESSURE: 118 MMHG | HEART RATE: 63 BPM | TEMPERATURE: 97.1 F | WEIGHT: 270 LBS | DIASTOLIC BLOOD PRESSURE: 64 MMHG | OXYGEN SATURATION: 98 % | BODY MASS INDEX: 36.62 KG/M2

## 2022-10-20 DIAGNOSIS — R06.09 DOE (DYSPNEA ON EXERTION): ICD-10-CM

## 2022-10-20 DIAGNOSIS — I25.118 CORONARY ARTERY DISEASE INVOLVING NATIVE CORONARY ARTERY OF NATIVE HEART WITH OTHER FORM OF ANGINA PECTORIS (HCC): Primary | ICD-10-CM

## 2022-10-20 PROBLEM — I25.10 CORONARY ARTERY DISEASE: Status: ACTIVE | Noted: 2022-10-20

## 2022-10-20 LAB
ANION GAP SERPL CALCULATED.3IONS-SCNC: 7 MEQ/L (ref 9–15)
BUN BLDV-MCNC: 15 MG/DL (ref 6–20)
CALCIUM SERPL-MCNC: 9 MG/DL (ref 8.5–9.9)
CHLORIDE BLD-SCNC: 103 MEQ/L (ref 95–107)
CO2: 30 MEQ/L (ref 20–31)
CREAT SERPL-MCNC: 1 MG/DL (ref 0.7–1.2)
GFR SERPL CREATININE-BSD FRML MDRD: >60 ML/MIN/{1.73_M2}
GLUCOSE BLD-MCNC: 166 MG/DL (ref 70–99)
LV EF: 60 %
LVEF MODALITY: NORMAL
POTASSIUM SERPL-SCNC: 4.2 MEQ/L (ref 3.4–4.9)
SODIUM BLD-SCNC: 140 MEQ/L (ref 135–144)

## 2022-10-20 PROCEDURE — 93572 IV DOP VEL&/PRESS C FLO EA: CPT | Performed by: INTERNAL MEDICINE

## 2022-10-20 PROCEDURE — 6370000000 HC RX 637 (ALT 250 FOR IP): Performed by: INTERNAL MEDICINE

## 2022-10-20 PROCEDURE — 99152 MOD SED SAME PHYS/QHP 5/>YRS: CPT

## 2022-10-20 PROCEDURE — 80048 BASIC METABOLIC PNL TOTAL CA: CPT

## 2022-10-20 PROCEDURE — 2500000003 HC RX 250 WO HCPCS

## 2022-10-20 PROCEDURE — C1725 CATH, TRANSLUMIN NON-LASER: HCPCS

## 2022-10-20 PROCEDURE — C1874 STENT, COATED/COV W/DEL SYS: HCPCS

## 2022-10-20 PROCEDURE — 93571 IV DOP VEL&/PRESS C FLO 1ST: CPT

## 2022-10-20 PROCEDURE — 93458 L HRT ARTERY/VENTRICLE ANGIO: CPT | Performed by: INTERNAL MEDICINE

## 2022-10-20 PROCEDURE — 6360000004 HC RX CONTRAST MEDICATION: Performed by: INTERNAL MEDICINE

## 2022-10-20 PROCEDURE — C1894 INTRO/SHEATH, NON-LASER: HCPCS

## 2022-10-20 PROCEDURE — C1769 GUIDE WIRE: HCPCS

## 2022-10-20 PROCEDURE — 99153 MOD SED SAME PHYS/QHP EA: CPT

## 2022-10-20 PROCEDURE — 93572 IV DOP VEL&/PRESS C FLO EA: CPT

## 2022-10-20 PROCEDURE — 92928 PRQ TCAT PLMT NTRAC ST 1 LES: CPT | Performed by: INTERNAL MEDICINE

## 2022-10-20 PROCEDURE — C1887 CATHETER, GUIDING: HCPCS

## 2022-10-20 PROCEDURE — 93306 TTE W/DOPPLER COMPLETE: CPT

## 2022-10-20 PROCEDURE — 2709999900 HC NON-CHARGEABLE SUPPLY

## 2022-10-20 PROCEDURE — 93571 IV DOP VEL&/PRESS C FLO 1ST: CPT | Performed by: INTERNAL MEDICINE

## 2022-10-20 PROCEDURE — 93458 L HRT ARTERY/VENTRICLE ANGIO: CPT

## 2022-10-20 PROCEDURE — 6370000000 HC RX 637 (ALT 250 FOR IP)

## 2022-10-20 PROCEDURE — 6360000002 HC RX W HCPCS

## 2022-10-20 PROCEDURE — 99152 MOD SED SAME PHYS/QHP 5/>YRS: CPT | Performed by: INTERNAL MEDICINE

## 2022-10-20 PROCEDURE — 92928 PRQ TCAT PLMT NTRAC ST 1 LES: CPT

## 2022-10-20 RX ORDER — ASPIRIN 81 MG/1
81 TABLET, CHEWABLE ORAL DAILY
Status: DISCONTINUED | OUTPATIENT
Start: 2022-10-21 | End: 2022-10-20 | Stop reason: HOSPADM

## 2022-10-20 RX ORDER — SODIUM CHLORIDE 9 MG/ML
INJECTION, SOLUTION INTRAVENOUS PRN
Status: DISCONTINUED | OUTPATIENT
Start: 2022-10-20 | End: 2022-10-20 | Stop reason: HOSPADM

## 2022-10-20 RX ORDER — SODIUM CHLORIDE 9 MG/ML
INJECTION, SOLUTION INTRAVENOUS CONTINUOUS
Status: DISCONTINUED | OUTPATIENT
Start: 2022-10-20 | End: 2022-10-20 | Stop reason: HOSPADM

## 2022-10-20 RX ORDER — HYDRALAZINE HYDROCHLORIDE 20 MG/ML
10 INJECTION INTRAMUSCULAR; INTRAVENOUS EVERY 10 MIN PRN
Status: DISCONTINUED | OUTPATIENT
Start: 2022-10-20 | End: 2022-10-20 | Stop reason: HOSPADM

## 2022-10-20 RX ORDER — MORPHINE SULFATE 2 MG/ML
2 INJECTION, SOLUTION INTRAMUSCULAR; INTRAVENOUS
Status: DISCONTINUED | OUTPATIENT
Start: 2022-10-20 | End: 2022-10-20 | Stop reason: HOSPADM

## 2022-10-20 RX ORDER — ASPIRIN 81 MG/1
81 TABLET ORAL DAILY
Qty: 90 TABLET | Refills: 1 | Status: SHIPPED | OUTPATIENT
Start: 2022-10-20 | End: 2022-11-09

## 2022-10-20 RX ORDER — SODIUM CHLORIDE 0.9 % (FLUSH) 0.9 %
5-40 SYRINGE (ML) INJECTION PRN
Status: DISCONTINUED | OUTPATIENT
Start: 2022-10-20 | End: 2022-10-20 | Stop reason: HOSPADM

## 2022-10-20 RX ORDER — ONDANSETRON 2 MG/ML
4 INJECTION INTRAMUSCULAR; INTRAVENOUS EVERY 6 HOURS PRN
Status: DISCONTINUED | OUTPATIENT
Start: 2022-10-20 | End: 2022-10-20 | Stop reason: HOSPADM

## 2022-10-20 RX ORDER — LABETALOL HYDROCHLORIDE 5 MG/ML
10 INJECTION, SOLUTION INTRAVENOUS EVERY 30 MIN PRN
Status: DISCONTINUED | OUTPATIENT
Start: 2022-10-20 | End: 2022-10-20 | Stop reason: HOSPADM

## 2022-10-20 RX ORDER — FENTANYL CITRATE 50 UG/ML
25 INJECTION, SOLUTION INTRAMUSCULAR; INTRAVENOUS
Status: DISCONTINUED | OUTPATIENT
Start: 2022-10-20 | End: 2022-10-20 | Stop reason: HOSPADM

## 2022-10-20 RX ORDER — SODIUM CHLORIDE 0.9 % (FLUSH) 0.9 %
5-40 SYRINGE (ML) INJECTION EVERY 12 HOURS SCHEDULED
Status: DISCONTINUED | OUTPATIENT
Start: 2022-10-20 | End: 2022-10-20 | Stop reason: HOSPADM

## 2022-10-20 RX ORDER — ASPIRIN 81 MG/1
324 TABLET, CHEWABLE ORAL ONCE
Status: COMPLETED | OUTPATIENT
Start: 2022-10-20 | End: 2022-10-20

## 2022-10-20 RX ORDER — ACETAMINOPHEN 325 MG/1
650 TABLET ORAL EVERY 4 HOURS PRN
Status: DISCONTINUED | OUTPATIENT
Start: 2022-10-20 | End: 2022-10-20 | Stop reason: HOSPADM

## 2022-10-20 RX ORDER — MIDAZOLAM HYDROCHLORIDE 2 MG/2ML
2 INJECTION, SOLUTION INTRAMUSCULAR; INTRAVENOUS
Status: DISCONTINUED | OUTPATIENT
Start: 2022-10-20 | End: 2022-10-20 | Stop reason: HOSPADM

## 2022-10-20 RX ADMIN — ASPIRIN 81 MG CHEWABLE TABLET 324 MG: 81 TABLET CHEWABLE at 09:19

## 2022-10-20 RX ADMIN — IOPAMIDOL 170 ML: 612 INJECTION, SOLUTION INTRAVENOUS at 09:17

## 2022-10-20 ASSESSMENT — PAIN SCALES - GENERAL: PAINLEVEL_OUTOF10: 0

## 2022-10-20 NOTE — BRIEF OP NOTE
BRIEF POST CATHETERIZATION REPORT  Full Report to Follow      Findings:      Hemodynamics  LVEDP 9   Left Main  Normal    RCA  Moderate size; 80-90% mid long lesion; 70% distal dominant    LAD  50-60% stenosis of mid LAD   Circ  60-70% mid at take off of OM1   LV  Not done    Sedation  2 mg Versed; 50 mcg Fentanyl   Contrast  80 cc for diagnostic portion    EBL 10 cc   Closure Device  TR band   Complications  None         IMPRESSIONS:   Moderate to severe 3 vessel CAD   - 85-90% stenosis of mid RCA and 70% distal RCA  - 60-70% stenosis of mid Circumflex   - 50-60% stenosis of mid LAD  Normal LV filling pressures       RECOMMENDATIONS:   Films reviewed with interventional cardiology. Decision to proceed with PCI/DIOGENES to the RCA and FFR analysis of the mid Cx. Please see Dr. Jones Buys separate interventional report for details. May also need FFR of LAD lesion. Continue antiplatelet therapy for 12 months uninterrupted. IVF rehydration post procedure. Home later today or tomorrow pending results of PCI. He will need follow up with me in 4 weeks after discharge with outpatient echocardiogram arranged. Thank you for allowing me to participate in your patient's cardiac care. Should you have questions, please do not hesitate to contact me.      Karen Marcum DO, [facility] [place], [facility]

## 2022-10-20 NOTE — PROCEDURES
CARDIAC CATHETERIZATION REPORT    Date:  10/20/2022    Referring physician:  Katelynn Ha MD    :  Brooks Pacheco DO    Procedures:  1. Left heart catheterization via the right radial artery approach. 2. Selective coronary angiography. 3. Moderate IV conscious sedation with 2 mg IV Versed and 50 mcg IV Fentanyl over total intra procedural sedation time of 24 minutes under the direct supervision of myself and sedating RN. Indications:   Stefany Barrett is a 47year old  with multiple cardiac risk factors and known history of extensive PAD. He complaints progressive exertional dyspnea consistent with [place] cardiovascular class II angina pectoralis equivalent. He underwent stress testing which was abnormal.  Left heart catheterization with coronary angiography was therefore offered to definitively delineate coronary angiography and guide possible revascularization options. Description of Procedure:   After risks of the procedure were explained in detail to the patient, informed consent was obtained and placed on the chart. He is brought down to the heart catheterization laboratory. The skin overlying the right radial artery was  draped and prepped in the usual sterile fashion. The skin is infiltrated with approximately 3 mL of 1% lidocaine plain. Utilizing a Seldinger technique, the right radial artery is accessed. A 5-Citizen of [place] Terumo sheath is passed. The sheath flushed with a cocktail mixture of IV nicardipine and IV nitroglycerin. This was followed with heparinized saline. He was given 3000 units of IV Heparin. Through the sheath, a 5-Citizen of [place] Tiger catheter was guided via fluoroscopic guidance over a J wire to the central aorta and flushed. The catheter was used to selectively engage the left coronary artery. Views of the left coronary system were then obtained in multiple projections via hand injection.  The catheter was used to engage the right coronary artery and views of the right coronary system then followed in multiple projections  The catheter was used to cross the aortic valve into left ventricle. A left ventriculogram was not obtained. Hemodynamic pressures recorded. Films reviewed with interventional cardiology. Decision to proceed with PCI/DIOGENES to the RCA and FFR analysis of the mid Cx. Please see Dr. Mik Martel separate interventional report for details. Continue antiplatelet therapy for 12 months uninterrupted. IVF rehydration post procedure. Home later today or tomorrow pending results of PCI. The catheters were removed. The sheath removed. A TR band applied. Adequate hemostasis obtained. The patient tolerated the procedure well without apparent complications. Findings:         Hemodynamics  LVEDP 9   Left Main  Normal    RCA  Moderate size; 80-90% mid long lesion; 70% distal dominant    LAD  50-60% stenosis of mid LAD; mild bridging mid   Circ  60-70% mid at take off of OM1   LV  Not done    Sedation  2 mg Versed; 50 mcg Fentanyl   Contrast  80 cc for diagnostic portion    EBL 10 cc   Closure Device  TR band   Complications  None         IMPRESSIONS:   Moderate to severe 3 vessel CAD   - 85-90% stenosis of mid RCA and 70% distal RCA  - 60-70% stenosis of mid Circumflex   - 50-60% stenosis of mid LAD  Normal LV filling pressures     RECOMMENDATIONS:   Films reviewed with interventional cardiology. Decision to proceed with PCI/DIOGENES to the RCA and FFR analysis of the mid Cx. Please see Dr. Mik Martel separate interventional report for details. May also need FFR of LAD lesion. Continue antiplatelet therapy for 12 months uninterrupted. IVF rehydration post procedure. Home later today or tomorrow pending results of PCI. He will need follow up with me in 4 weeks after discharge with outpatient echocardiogram arranged. ADDENDUM:   PCI/DIOGENES x 3 to the RCA; negative iFR LAD and Lcx. Add aspirin 81 mg daily for 30 days to Plavix, then stop.  Continue Xarelto     Thank you for allowing me to participate in your patient's cardiac care. Should you have questions, please do not hesitate to contact me.      Halie Esquivel DO, Charna Medici

## 2022-10-20 NOTE — BRIEF OP NOTE
Section of Cardiology  Adult Brief Cardiac Cath Procedure Note        Procedure(s):  PCI of mid and distal RCA, IFR of LAD=0.97, cx 0.99    Pre-operative Diagnosis:  SOB    H&P Status: Completed and reviewed.      Post-operative Diagnosis:      90% mid RCA    50-60% prox LAD    60% mid CX/OM1    Findings:  See full report    Complications:  none    Primary Proceduralist:   Dr.Wes Acuña DO      Full procedure note to follow

## 2022-10-20 NOTE — LETTER
[place] [address]  Dept: [phone]  Dept Fax: [phone]  Loc: [address] [facility]  La[address]           RETURN TO WORK STATUS  10/20/2022      Diagnosis (optional) ***    These are your Hhbjtj-vq-Hpit Instructions. It may contain personal and confidential  information about your health. It is up to you to share  these instructions as necessary with your employer(s) as required by their policies for you to return to work. WORK STATUS: {[facility] CM WORK SGVWFJ:[id number]}    May return to work on Monday October 24th, 2022 with no restrictions.   If any questions please call Dr. Margarita Contreras office at [phone]

## 2022-10-20 NOTE — PROGRESS NOTES
Arrived to pre/post from the cath lab and report from [facility]. Vasc band to right radial with no bleeding or hematoma. Attached to monitor and vitals are stable.   Eating and taking fluids at this time

## 2022-10-20 NOTE — CONSULTS
Cardiac Rehab Consult Note  Name: Alana Rojas II  Age: 47 y.o. Gender: male    Chief Complaint:No chief complaint on file. Primary Care Provider: Trever Zaidi MD  InpatientTreatment Team: Treatment Team: Attending Provider: Analia Reeves DO  Admission Date: 10/20/2022    Consult Received from Dr. Muriel Han. Reason for consult PCI. Patient visited at bedside. Program and benefits introduced. Brochures given. Patient's response interested. Active Problems:    Coronary artery disease  Resolved Problems:    * No resolved hospital problems. *       Plan: will follow up in Phase II CR    All of pt questions were answered. Case will be discussed with physician when appropriate.      Electronically signed by Teena Lyon on 10/20/2022 at 11:28 AM

## 2022-10-20 NOTE — H&P
H & P PRE MODERATE SEDATION     Date: 10/20/2022     Physician: Mitzi Graham DO    Referring Physician: Mayuri Navarro MD      Pre-Procedure Diagnosis: Abnormal stress test; CRAR    Current Medication/Doses were reviewed and Lab tests were reviewed. No Known Allergies       HISTORY AND REVIEW OF SYSTEMS:  Past Medical History:   Diagnosis Date    Diabetes mellitus ([address]     Kidney stone     Pneumonia     Sleep apnea     Syncope and collapse 2018    Vasovagal syncope 2018      Past Surgical History:   Procedure Laterality Date    CATARACT REMOVAL WITH IMPLANT Bilateral     IR THROMB [facility]ERIVE [facility]OSPITAL VEIN  9/10/2021    IR THROMB DAUTERIVE HOSPITAL VEIN 9/10/2021 [facility] SPECIAL PROCEDURE      Social History     Tobacco Use    Smoking status: Former     Packs/day: 0.25     Years: 30.00     Pack years: 7.50     Types: Cigars, Cigarettes     Quit date: 2021     Years since quittin.1    Smokeless tobacco: Never   Substance Use Topics    Alcohol use: No     Comment: social      Family History   Problem Relation Age of Onset    Other Mother         COPD    Diabetes Father           Details of Positive Pertinent Findings: No additional findings. PHYSICIAN ASSESSMENT FOR MODERATE SEDATION:  Baseline LOC: Alert  Lungs: Clear  Heart: Regular  Oropharynx/Neck: Open Wide >/= 3 cm  ASA Class: II - Moderate Disease     Previous Patient/Family Anesthesia/Sedation/Airway Problems: None     Based on the pre-procedure assessment/H&P and lack of allergy to sedation, patient is suitable candidate for sedation/analgesia during planned procedure. Options, risks, and benefits of sedation and procedure have been discussed with patient and/or patient's representative.      PATIENT REASSESSED IMMEDIATELY PRIOR TO PROCEDURE AND FOUND TO BE APPROPRIATE CANDIDATE FOR PLANNED ANESTHETIC:  Yes     Mitzi Graham DO, Zachery Madison
- - -

## 2022-10-20 NOTE — DISCHARGE INSTRUCTIONS
Start aspirin 81 mg daily for 30 days THEN STOP. CONTINUE PLAVIX 75 mg WITHOUT ANY INTERRUPTION FOR THE NEXT 12 MONTHS.   [facility]    Call [phone] to schedule echocardiogram before your next office visit with Dr. Luis Meredith shower and remove right wrist dressing in the morning  No driving for 24 hours  No heavy lifting anything over 5 pounds for 2 days  Any redness, drainage, increased swelling or fever please call Dr. Conroy No office  Any bleeding apply pressure and if needed call [phone]  Follow up in 4 weeks, please call for appointment

## 2022-10-31 NOTE — PROCEDURES
[address]                      [address], [address] [facility]                                 PROCEDURE NOTE    PATIENT NAME: Ramy Santos                   :        1970  MED REC NO:   [id number]                            ROOM:  ACCOUNT NO:   [de-identified]                           ADMIT DATE: 10/20/2022  PROVIDER:     Keon Acuña DO    DATE OF PROCEDURE:  10/20/2022    PROCEDURES PERFORMED:  PCI of the mid and distal RCA, IFR of the LAD  equals to 0.97, IFR of the circumflex equals to 0.99. PROCEDURE PERFORMED BY:  Dave Acuña DO    INDICATIONS:  Shortness of breath. DESCRIPTION OF PROCEDURE:  The patient had diagnostic cardiac  catheterization performed by general cardiologist.  I was asked to  intervene. Next, a 6-English guiding catheter was engaged in the right  coronary ostium. Angiogram of the right coronary artery was performed  an 0.014 wire was engaged _____ vessel and PCI of the mid and distal  segments of the right coronary artery were performed per usual technique  without any difficulty. Please see nursing notes for details of balloon  and stents utilized and atmospheres achieved. Next, guiding catheter  was engaged in the left main coronary artery and IFR of the LAD as well  as circumflex were performed per usual technique without any difficulty. The LAD IFR was equal to 0.97 and the circumflex 0.99 and they were  medically treated. No intervention was performed. All catheters and  wires were removed from the patient and the patient was transferred to  the postcath holding area in stable and satisfactory conditions. FINDINGS:  RCA with 90% mid RCA stenosis, proximal LAD 50-60%,  circumflex mid 60% at OM1. ASSESSMENT:  1.  Status post successful PCI of the mid and distal RCA with  drug-eluting stent with 0% residual stenosis and ELIAS 3 flow pre and  post-PCI. 2.  IFR of the LAD is equal to 0.97%.   3.  IFR of the circumflex is equal to 0.99%. PLAN:  1. Postprocedure care as usual.  2.  As always, aggressive risk factor modification. 3.  Maximize medical therapy.         Gayla Hernandez DO    D: 10/30/2022 20:27:46       T: 10/30/2022 21:49:09     [email or web address]  Job#: [id number]     Doc#: [id number]    CC:

## 2022-11-09 ENCOUNTER — OFFICE VISIT (OUTPATIENT)
Dept: CARDIOLOGY CLINIC | Age: 52
End: 2022-11-09
Payer: COMMERCIAL

## 2022-11-09 VITALS
WEIGHT: 273.4 LBS | OXYGEN SATURATION: 96 % | BODY MASS INDEX: 37.03 KG/M2 | HEART RATE: 61 BPM | SYSTOLIC BLOOD PRESSURE: 114 MMHG | HEIGHT: 72 IN | DIASTOLIC BLOOD PRESSURE: 74 MMHG

## 2022-11-09 DIAGNOSIS — Z13.6 SCREENING FOR HEART DISEASE: Primary | ICD-10-CM

## 2022-11-09 PROCEDURE — 99214 OFFICE O/P EST MOD 30 MIN: CPT | Performed by: INTERNAL MEDICINE

## 2022-11-09 PROCEDURE — 93000 ELECTROCARDIOGRAM COMPLETE: CPT | Performed by: INTERNAL MEDICINE

## 2022-11-09 RX ORDER — ATORVASTATIN CALCIUM 80 MG/1
80 TABLET, FILM COATED ORAL DAILY
Qty: 90 TABLET | Refills: 1 | Status: SHIPPED | OUTPATIENT
Start: 2022-11-09

## 2022-11-09 NOTE — PATIENT INSTRUCTIONS
Increase Lipitor (atorvastatin) to 80 mg nightly  Stop aspirin. Continue Plavix (clopidogrel) for next 12 months without interruption. Continue Xarelto. Refer to cardiac rehab. Follow up in 1 year, sooner if symptoms come back.

## 2022-11-09 NOTE — PROGRESS NOTES
2022    MD Monique [address]eron[address]  [facility] [address]    RE: Makayla Bloodgood II   : 1970     Dear Dr. Sydnie Daniel :    I had the pleasure of seeing your patient, Sarahy Zaldivar in the office today on 2022. As you are well aware, Mr. Laila Bustillos is a pleasant 47 y.o.  male who returns today fo follow up of severe PAD status post  with 5 total covered stents to the infrarenal abdominal aorta as well as bilateral iliac bifurcations and severe single vessel CAD status post stents x 3 to the RCA. There is residual 60-70% mid Circumflex stenosis and 50-60% mid LAD stenosis, both lesions non obstructive by iFR. Currently he reports feeling \"better\". He denies any chest pain or limiting exertional dyspnea day-to-day activities. He is looking to start exercising with his wife in the near future. He reports rare palpitations but no near-syncope or syncope. He has been compliant with medications including aspirin, Plavix, and Xarelto without any interruption. Current medications:   Current Outpatient Medications   Medication Sig Dispense Refill    aspirin EC 81 MG EC tablet Take 1 tablet by mouth daily 90 tablet 1    atorvastatin (LIPITOR) 40 MG tablet Take 1 tablet by mouth daily 30 tablet 3    atenolol (TENORMIN) 25 MG tablet Take 1 tablet by mouth daily 30 tablet 6    clopidogrel (PLAVIX) 75 MG tablet Take 1 tablet by mouth daily 90 tablet 3    rivaroxaban (XARELTO) 20 MG TABS tablet take 1 tablet by mouth once daily WITH BREAKFAST 90 tablet 3    acetaminophen (TYLENOL) 500 MG tablet Take 1,000 mg by mouth every 8 hours as needed      CPAP Machine MISC by Does not apply route 1 each 0     No current facility-administered medications for this visit. Allergies: Patient has no known allergies. Review of Systems   General: Feels better. Still with some residual fatigue. Cardiovascular: See HPI. No orthopnea or PND.    Respiratory: Dyspnea is present with mild degrees of activity. + OLVIN, wears CPAP  Gastrointestinal: No melena or hematochezia. Genitourinary: No hematuria. Hematological: No easy bruising or bleeding on aspirin, Plavix, and Xarelto. Vascular: No lower extremity edema . No further claudication. Neurological: No TIA or CVA symptoms. + paresthesias. Musculoskeletal: No chest wall pain. Psychiatric: No anxiety. *All other systems were reviewed and found to be negative unless otherwise noted in the HPI*    Physical Examination: His  height is 6' (1.829 m) and weight is 273 lb 6.4 oz (124 kg). His blood pressure is 114/74 and his pulse is 61. His oxygen saturation is 96%. He is alert and oriented to person, place, and time. No distress. Head is atraumatic. Moist mucous membranes. Neck is supple without JVD or carotid bruits. No thyroidmegaly. Lungs are clear to auscultation both anteriorly and posteriorly. No accessory muscle usage. Heart is a regular rate and rhythm. No murmurs. No S3 or S4. PMI is nondisplaced. Abdomen is soft and non tender. No hepatosplenomegaly. No abdominal aortic bruits or masses. Lower extremities with mild pitting edema and chronic venous changes. No clubbing or cyanosis. Neurologically, cranial nerves are grossly intact. No focal sensory and/or motor deficits. Skin is intact without rash or lesions. ECG (11/9/2022): Sinus rhythm HR 83 bpm. LAFB. Poor R wave progression. Echocardiogram (10/20/2022): EF 60%. Mild LVH. Grade 1 diastolic dysfunction. Mildly dilated left atrium. Cardiac catheterization (10/20/2022): Moderate to severe 3 vessel CAD   - 85-90% stenosis of mid RCA and 70% distal RCA  - 60-70% stenosis of mid Circumflex   - 50-60% stenosis of mid LAD  Normal LV filling pressures  PCI/DIOGENES x 3 to the RCA; negative iFR LAD and Lcx. IMPRESSION:  Stable CAD, status post PCI/drug eluding stents x 3 with residual 60-70% mid Circumflex stenosis and 50-60% mid LAD stenosis, both lesions non obstructive by iFR. Severe PAD, status post PCI/stents to the distal abdominal aorta and bilateral iliac arteries. Hypertension  DM  OLVIN on CPAP  Hx recurrent venous thromboembolism, on Xarelto      RECOMMENDATIONS:  Mr. Kosta Devlin appears to be stable from a cardiac standpoint. The best course of action is continue medical therapy and aggressive risk factor modification. Now that it has been greater than 30 days since the stents were placed, he is advised to stop aspirin but continue Plavix without any interruption for the next year. He is also advised to continue Xarelto. Given his history of aggressive atherosclerosis, I have recommended we increase Lipitor to 80 mg nightly. He will be referred to cardiac rehab. He is agreeable to the plan. Follow-up cardiac evaluation in 1 year, sooner if needed. Thank you very much for allowing me to participate in Mr. Miranda Arechiga cardiac care. Should you have any questions, please do not hesitate to contact me.      Sincerely,    Solomon Shane DO, [facility] [place], [address] [facility]

## 2022-11-29 ENCOUNTER — OFFICE VISIT (OUTPATIENT)
Dept: FAMILY MEDICINE CLINIC | Age: 52
End: 2022-11-29
Payer: COMMERCIAL

## 2022-11-29 VITALS
WEIGHT: 275 LBS | DIASTOLIC BLOOD PRESSURE: 84 MMHG | BODY MASS INDEX: 37.3 KG/M2 | TEMPERATURE: 97.7 F | HEART RATE: 62 BPM | OXYGEN SATURATION: 96 % | SYSTOLIC BLOOD PRESSURE: 130 MMHG

## 2022-11-29 DIAGNOSIS — E11.9 TYPE 2 DIABETES MELLITUS WITHOUT COMPLICATION, WITHOUT LONG-TERM CURRENT USE OF INSULIN (HCC): ICD-10-CM

## 2022-11-29 DIAGNOSIS — Z86.718 HISTORY OF DEEP VEIN THROMBOSIS (DVT) OF LOWER EXTREMITY: ICD-10-CM

## 2022-11-29 DIAGNOSIS — I73.9 PAD (PERIPHERAL ARTERY DISEASE) (HCC): ICD-10-CM

## 2022-11-29 DIAGNOSIS — I25.10 CORONARY ARTERY DISEASE INVOLVING NATIVE HEART WITHOUT ANGINA PECTORIS, UNSPECIFIED VESSEL OR LESION TYPE: ICD-10-CM

## 2022-11-29 DIAGNOSIS — D68.59 HYPERCOAGULABLE STATE (HCC): Primary | ICD-10-CM

## 2022-11-29 PROCEDURE — 3044F HG A1C LEVEL LT 7.0%: CPT | Performed by: FAMILY MEDICINE

## 2022-11-29 PROCEDURE — 99214 OFFICE O/P EST MOD 30 MIN: CPT | Performed by: FAMILY MEDICINE

## 2022-11-29 NOTE — PROGRESS NOTES
Chief Complaint   Patient presents with    Diabetes     Here for follow up on diabetes. HPI:  Rc Muir II is a 47 y.o. male     F/u on DM    No meds for this    He had recent interventions for CAD and PAD     Notes reviewed    Starts Cardiac Rehab 12/1    Lab Results   Component Value Date    LABA1C 6.9 (H) 03/16/2022     No results found for: EAG          Wt Readings from Last 3 Encounters:   11/29/22 275 lb (124.7 kg)   11/09/22 273 lb 6.4 oz (124 kg)   10/20/22 270 lb (122.5 kg)         Patient Active Problem List   Diagnosis    Vasovagal syncope    Diabetes mellitus ([facility]    Sleep apnea    Acute deep vein thrombosis (DVT) of right femoral vein (HCC)    Acute deep vein thrombosis (DVT) of right popliteal vein (HCC)    Superficial thrombophlebitis of right leg    Post-thrombotic syndrome of right lower extremity    Aching leg syndrome of left lower extremity    Iliac vein stenosis, right    Claudication (HCC)    Coronary artery disease       Current Outpatient Medications   Medication Sig Dispense Refill    atorvastatin (LIPITOR) 80 MG tablet Take 1 tablet by mouth daily 90 tablet 1    atenolol (TENORMIN) 25 MG tablet Take 1 tablet by mouth daily 30 tablet 6    clopidogrel (PLAVIX) 75 MG tablet Take 1 tablet by mouth daily 90 tablet 3    rivaroxaban (XARELTO) 20 MG TABS tablet take 1 tablet by mouth once daily WITH BREAKFAST 90 tablet 3    acetaminophen (TYLENOL) 500 MG tablet Take 1,000 mg by mouth every 8 hours as needed      CPAP Machine MISC by Does not apply route 1 each 0     No current facility-administered medications for this visit.          Past Medical History:   Diagnosis Date    Diabetes mellitus ([facility]     Kidney stone     Pneumonia     Sleep apnea     Syncope and collapse 5/1/2018    Vasovagal syncope 5/1/2018     Past Surgical History:   Procedure Laterality Date    CATARACT REMOVAL WITH IMPLANT Bilateral 2015    IR THROMB [facility]ERIVE HOSPITAL VEIN  9/10/2021    IR THROMB DAUTERIVE HOSPITAL VEIN 9/10/2021 [facility] SPECIAL PROCEDURE     Family History   Problem Relation Age of Onset    Other Mother         COPD    Diabetes Father      Social History     Socioeconomic History    Marital status:      Spouse name: None    Number of children: None    Years of education: None    Highest education level: None   Tobacco Use    Smoking status: Former     Packs/day: 0.25     Years: 30.00     Pack years: 7.50     Types: Cigars, Cigarettes     Quit date: 2021     Years since quittin.2    Smokeless tobacco: Never   Vaping Use    Vaping Use: Never used   Substance and Sexual Activity    Alcohol use: No     Comment: social    Drug use: No     Social Determinants of Health     Financial Resource Strain: Low Risk     Difficulty of Paying Living Expenses: Not hard at all   Food Insecurity: No Food Insecurity    Worried About Running Out of Food in the Last Year: Never true    Ran Out of Food in the Last Year: Never true   Transportation Needs: No Transportation Needs    Lack of Transportation (Medical): No    Lack of Transportation (Non-Medical): No     No Known Allergies    Review of Systems:   General ROS: fatigue  Respiratory ROS: no cough, shortness of breath, or wheezing  Cardiovascular ROS: no chest pain or dyspnea on exertion  Gastrointestinal ROS: no abdominal pain, change in bowel habits, or black or bloody stools  Genito-Urinary ROS: no dysuria, trouble voiding  Musculoskeletal ROS: back pain ongoing, hip pain  Neurological ROS: numbness in feet    In general patient otherwise reports feeling well.      Physical Exam:  /84 (Site: Left Upper Arm, Position: Sitting, Cuff Size: Medium Adult)   Pulse 62   Temp 97.7 °F (36.5 °C) (Infrared)   Wt 275 lb (124.7 kg)   SpO2 96%   BMI 37.30 kg/m²     Gen: Well, NAD, Alert, Oriented x 3   HEENT: EOMI, eyes clear, MMM  Skin: multiple skin tags on neck  Neck: no significant lymphadenopathy or thyromegaly  Lungs: CTA B w/out Rales/Wheezes/Rhonchi, Good respiratory effort Heart: RRR, S1S2, w/out M/R/G, non-displaced PMI    Ext: No C/C/E Bilaterally. Neuro: Neurovascularly intact w/ Sensory/Motor intact UE/LE Bilaterally. Foot exam done elswhere    Lab Results   Component Value Date    WBC 13.1 (H) 09/29/2022    HGB 14.4 09/29/2022    HCT 43.4 09/29/2022     09/29/2022    CHOL 215 (H) 03/16/2022    TRIG 349 (H) 03/16/2022    HDL 28 (L) 03/16/2022    ALT 19 03/16/2022    AST 14 03/16/2022     10/20/2022    K 4.2 10/20/2022     10/20/2022    CREATININE 1.00 10/20/2022    BUN 15 10/20/2022    CO2 30 10/20/2022    TSH 1.500 02/14/2019    PSA 0.50 03/16/2022    INR 1.0 08/26/2021    LABA1C 6.9 (H) 03/16/2022    LABMICR <1.20 03/16/2022         A&P   Diagnosis Orders   1. Hypercoagulable state ([facility]        2. History of deep vein thrombosis (DVT) of lower extremity        3. Type 2 diabetes mellitus without complication, without long-term current use of insulin (HCC)  Hemoglobin A1C      4. Coronary artery disease involving native heart without angina pectoris, unspecified vessel or lesion type        5.  PAD (peripheral artery disease) ([facility]              Compliant with CPAP, benefits from use    Check a1c    Overall doing well     Wt Readings from Last 3 Encounters:   11/29/22 275 lb (124.7 kg)   11/09/22 273 lb 6.4 oz (124 kg)   10/20/22 270 lb (122.5 kg)         Bertha Hector MD

## 2022-11-30 LAB — HBA1C MFR BLD: 8 % (ref 4.8–5.9)

## 2022-12-01 ENCOUNTER — HOSPITAL ENCOUNTER (OUTPATIENT)
Dept: CARDIAC REHAB | Age: 52
Setting detail: THERAPIES SERIES
Discharge: HOME | End: 2022-12-01
Payer: COMMERCIAL

## 2022-12-01 VITALS — HEIGHT: 72 IN | WEIGHT: 274 LBS | RESPIRATION RATE: 16 BRPM | OXYGEN SATURATION: 94 % | BODY MASS INDEX: 37.11 KG/M2

## 2022-12-01 PROCEDURE — G0422 INTENS CARDIAC REHAB W/EXERC: HCPCS

## 2022-12-01 ASSESSMENT — PATIENT HEALTH QUESTIONNAIRE - PHQ9
6. FEELING BAD ABOUT YOURSELF - OR THAT YOU ARE A FAILURE OR HAVE LET YOURSELF OR YOUR FAMILY DOWN: 0
SUM OF ALL RESPONSES TO PHQ QUESTIONS 1-9: 7
2. FEELING DOWN, DEPRESSED OR HOPELESS: 0
1. LITTLE INTEREST OR PLEASURE IN DOING THINGS: 1
SUM OF ALL RESPONSES TO PHQ9 QUESTIONS 1 & 2: 1
4. FEELING TIRED OR HAVING LITTLE ENERGY: 3
SUM OF ALL RESPONSES TO PHQ QUESTIONS 1-9: 7
8. MOVING OR SPEAKING SO SLOWLY THAT OTHER PEOPLE COULD HAVE NOTICED. OR THE OPPOSITE, BEING SO FIGETY OR RESTLESS THAT YOU HAVE BEEN MOVING AROUND A LOT MORE THAN USUAL: 0
SUM OF ALL RESPONSES TO PHQ QUESTIONS 1-9: 7
SUM OF ALL RESPONSES TO PHQ QUESTIONS 1-9: 7
3. TROUBLE FALLING OR STAYING ASLEEP: 3
10. IF YOU CHECKED OFF ANY PROBLEMS, HOW DIFFICULT HAVE THESE PROBLEMS MADE IT FOR YOU TO DO YOUR WORK, TAKE CARE OF THINGS AT HOME, OR GET ALONG WITH OTHER PEOPLE: 0
9. THOUGHTS THAT YOU WOULD BE BETTER OFF DEAD, OR OF HURTING YOURSELF: 0
7. TROUBLE CONCENTRATING ON THINGS, SUCH AS READING THE NEWSPAPER OR WATCHING TELEVISION: 0
5. POOR APPETITE OR OVEREATING: 0

## 2022-12-01 ASSESSMENT — EXERCISE STRESS TEST
PEAK_BP: 146/92
PEAK_HR: 94
PEAK_RPE: 11
PEAK_RPD: 0
PEAK_BP: 146/92

## 2022-12-01 ASSESSMENT — EJECTION FRACTION
EF_VALUE: 60
EF_VALUE: 60

## 2022-12-01 ASSESSMENT — NEW YORK HEART ASSOCIATION (NYHA) CLASSIFICATION: NYHA FUNCTIONAL CLASS: NO NYHA CLASS OR UNABLE TO DETERMINE

## 2022-12-01 ASSESSMENT — LIFESTYLE VARIABLES
CIGARETTES_PER_DAY: 1PPD
SMOKELESS_TOBACCO: NO

## 2022-12-01 NOTE — CARDIO/PULMONARY
[address] [facility] Cardiac Rehab Intake Note    Patient arrived to OP Phase II with admitting DX: PCI of LAD & PCA     Patient has PMH of: PAD, DM, Kidney Stone, Pneumonia, sleep apena, syncope & collapse, vasovagal    Physical examination: Strong pulse, lungs clear bilaterally, heart sounds normal, level 2 pitting edema RLE, no swelling to LLE      Exercise: Patient tolerated warm up, six-minute walk test, 10 minutes of sustained CV exercise on Nu-Step seated stepper, and cool-down. Patient complaints/signs/symptoms: slight chest pain 2/10, pinching feeling. Note to be sent to cardiology. POC: Patient will attend OP Phase II program: ICR 3x weekly for 12 weeks or until 36 sessions are completed.      Duc Adams M.S. [facility]  Cardiac Rehab Coordinator

## 2022-12-01 NOTE — LETTER
Dr. Marley Blue,   Please review and sign Cardiac Rehab ITP/Care plan by dping the following  1. Click on the note itself   2. Hover and click on \". ..\"  3. Go to encounter  4. Go to flowsheets  5. Select cardiac rehab flowsheets  6. Review and sign at the bottom  7. File    I hope this helps!     Thanks!  -Foodem

## 2022-12-02 ENCOUNTER — APPOINTMENT (OUTPATIENT)
Dept: CARDIAC REHAB | Age: 52
End: 2022-12-02
Payer: COMMERCIAL

## 2022-12-05 ENCOUNTER — APPOINTMENT (OUTPATIENT)
Dept: CARDIAC REHAB | Age: 52
End: 2022-12-05
Payer: COMMERCIAL

## 2022-12-05 ENCOUNTER — HOSPITAL ENCOUNTER (OUTPATIENT)
Dept: CARDIAC REHAB | Age: 52
Setting detail: THERAPIES SERIES
Discharge: HOME | End: 2022-12-05
Payer: COMMERCIAL

## 2022-12-05 PROCEDURE — G0422 INTENS CARDIAC REHAB W/EXERC: HCPCS

## 2022-12-05 NOTE — CARDIO/PULMONARY
COVID Screening completed. Patient denies complaints, no changes to PMH or medications. Patient tolerates exercise well.   Electronically signed by Manuel Troy RN on 12/5/2022 at 4:39 PM 17-Jul-2022

## 2022-12-07 ENCOUNTER — HOSPITAL ENCOUNTER (OUTPATIENT)
Dept: CARDIAC REHAB | Age: 52
Setting detail: THERAPIES SERIES
Discharge: HOME | End: 2022-12-07
Payer: COMMERCIAL

## 2022-12-07 ENCOUNTER — APPOINTMENT (OUTPATIENT)
Dept: CARDIAC REHAB | Age: 52
End: 2022-12-07
Payer: COMMERCIAL

## 2022-12-07 PROCEDURE — G0422 INTENS CARDIAC REHAB W/EXERC: HCPCS

## 2022-12-07 NOTE — CARDIO/PULMONARY
COVID Screening completed. Patient denies complaints, no changes to PMH or medications. Patient tolerates exercise well, with time on the treadmill and nustep. All equipment used in the care for this patient has been cleaned.   Electronically signed by Clemente Enriquez RN on 12/7/2022 at 4:32 PM

## 2022-12-09 ENCOUNTER — APPOINTMENT (OUTPATIENT)
Dept: CARDIAC REHAB | Age: 52
End: 2022-12-09
Payer: COMMERCIAL

## 2022-12-12 ENCOUNTER — APPOINTMENT (OUTPATIENT)
Dept: CARDIAC REHAB | Age: 52
End: 2022-12-12
Payer: COMMERCIAL

## 2022-12-12 PROCEDURE — G0422 INTENS CARDIAC REHAB W/EXERC: HCPCS

## 2022-12-12 NOTE — CARDIO/PULMONARY
COVID Screening completed. Patient denies complaints, no changes to PMH or medications. Patient tolerates exercise well.   Electronically signed by Santa Barlow RN on 12/12/2022 at 3:35 PM

## 2022-12-14 ENCOUNTER — APPOINTMENT (OUTPATIENT)
Dept: CARDIAC REHAB | Age: 52
End: 2022-12-14
Payer: COMMERCIAL

## 2022-12-14 PROCEDURE — G0422 INTENS CARDIAC REHAB W/EXERC: HCPCS

## 2022-12-14 NOTE — CARDIO/PULMONARY
COVID Screening completed. Patient denies complaints, no changes to PMH or medications. Discussed stress and how to manage, handout offered. Patient tolerates exercise well.   Electronically signed by Maximus Morelos RN on 12/14/2022 at 3:09 PM

## 2022-12-16 ENCOUNTER — APPOINTMENT (OUTPATIENT)
Dept: CARDIAC REHAB | Age: 52
End: 2022-12-16
Payer: COMMERCIAL

## 2022-12-16 PROCEDURE — G0422 INTENS CARDIAC REHAB W/EXERC: HCPCS

## 2022-12-16 NOTE — CARDIO/PULMONARY
COVID Screening completed. Patient denies complaints, no changes to PMH or medications. Patient tolerates exercise well. All equipment used in the care for this patient has been cleaned.   Electronically signed by Norma Rolle RN on 12/16/2022 at 2:51 PM

## 2022-12-19 ENCOUNTER — APPOINTMENT (OUTPATIENT)
Dept: CARDIAC REHAB | Age: 52
End: 2022-12-19
Payer: COMMERCIAL

## 2022-12-19 PROCEDURE — G0422 INTENS CARDIAC REHAB W/EXERC: HCPCS

## 2022-12-19 NOTE — CARDIO/PULMONARY
COVID Screening completed. Patient denies complaints, no changes to PMH or medications. Patient tolerates exercise well.   Electronically signed by Erika Boykin RN on 12/19/2022 at 4:02 PM

## 2022-12-21 ENCOUNTER — APPOINTMENT (OUTPATIENT)
Dept: CARDIAC REHAB | Age: 52
End: 2022-12-21
Payer: COMMERCIAL

## 2022-12-23 ENCOUNTER — APPOINTMENT (OUTPATIENT)
Dept: CARDIAC REHAB | Age: 52
End: 2022-12-23
Payer: COMMERCIAL

## 2022-12-28 ENCOUNTER — APPOINTMENT (OUTPATIENT)
Dept: CARDIAC REHAB | Age: 52
End: 2022-12-28
Payer: COMMERCIAL

## 2022-12-30 ENCOUNTER — APPOINTMENT (OUTPATIENT)
Dept: CARDIAC REHAB | Age: 52
End: 2022-12-30
Payer: COMMERCIAL

## 2023-01-04 ENCOUNTER — HOSPITAL ENCOUNTER (OUTPATIENT)
Dept: CARDIAC REHAB | Age: 53
Setting detail: THERAPIES SERIES
Discharge: HOME | End: 2023-01-04

## 2023-01-04 ASSESSMENT — LIFESTYLE VARIABLES
CIGARETTES_PER_DAY: 1PPD
SMOKELESS_TOBACCO: NO

## 2023-01-04 ASSESSMENT — EJECTION FRACTION: EF_VALUE: 60

## 2023-01-04 ASSESSMENT — EXERCISE STRESS TEST
PEAK_BP: 138/76
PEAK_RPD: 0

## 2023-01-06 ENCOUNTER — HOSPITAL ENCOUNTER (OUTPATIENT)
Dept: CARDIAC REHAB | Age: 53
Setting detail: THERAPIES SERIES
Discharge: HOME | End: 2023-01-06

## 2023-01-20 ENCOUNTER — HOSPITAL ENCOUNTER (OUTPATIENT)
Dept: CARDIAC REHAB | Age: 53
Setting detail: THERAPIES SERIES
Discharge: HOME | End: 2023-01-20

## 2023-01-23 ENCOUNTER — HOSPITAL ENCOUNTER (OUTPATIENT)
Dept: CARDIAC REHAB | Age: 53
Setting detail: THERAPIES SERIES
Discharge: HOME | End: 2023-01-23

## 2023-01-23 ASSESSMENT — EJECTION FRACTION: EF_VALUE: 60

## 2023-01-23 ASSESSMENT — LIFESTYLE VARIABLES
SMOKELESS_TOBACCO: NO
CIGARETTES_PER_DAY: 1PPD

## 2023-01-23 ASSESSMENT — EXERCISE STRESS TEST: PEAK_BP: 138/76

## 2023-01-25 ASSESSMENT — LIFESTYLE VARIABLES
SMOKELESS_TOBACCO: NO
CIGARETTES_PER_DAY: 1PPD

## 2023-01-25 ASSESSMENT — EJECTION FRACTION: EF_VALUE: 60

## 2023-01-25 ASSESSMENT — EXERCISE STRESS TEST: PEAK_BP: 138/76

## 2023-01-26 ENCOUNTER — HOSPITAL ENCOUNTER (OUTPATIENT)
Dept: CARDIAC REHAB | Age: 53
Discharge: HOME | End: 2023-01-26

## 2023-02-15 ENCOUNTER — OFFICE VISIT (OUTPATIENT)
Dept: FAMILY MEDICINE CLINIC | Age: 53
End: 2023-02-15

## 2023-02-15 VITALS
WEIGHT: 276 LBS | HEIGHT: 72 IN | BODY MASS INDEX: 37.38 KG/M2 | SYSTOLIC BLOOD PRESSURE: 112 MMHG | HEART RATE: 61 BPM | OXYGEN SATURATION: 94 % | TEMPERATURE: 98 F | DIASTOLIC BLOOD PRESSURE: 72 MMHG

## 2023-02-15 DIAGNOSIS — U07.1 COVID-19: ICD-10-CM

## 2023-02-15 DIAGNOSIS — Z20.822 SUSPECTED COVID-19 VIRUS INFECTION: Primary | ICD-10-CM

## 2023-02-15 LAB
Lab: ABNORMAL
PERFORMING INSTRUMENT: ABNORMAL
QC PASS/FAIL: ABNORMAL
SARS-COV-2, POC: DETECTED

## 2023-02-15 RX ORDER — ATORVASTATIN CALCIUM 40 MG/1
TABLET, FILM COATED ORAL
COMMUNITY
Start: 2022-11-12 | End: 2023-02-15 | Stop reason: DRUGHIGH

## 2023-02-15 SDOH — ECONOMIC STABILITY: FOOD INSECURITY: WITHIN THE PAST 12 MONTHS, THE FOOD YOU BOUGHT JUST DIDN'T LAST AND YOU DIDN'T HAVE MONEY TO GET MORE.: NEVER TRUE

## 2023-02-15 SDOH — ECONOMIC STABILITY: INCOME INSECURITY: HOW HARD IS IT FOR YOU TO PAY FOR THE VERY BASICS LIKE FOOD, HOUSING, MEDICAL CARE, AND HEATING?: NOT HARD AT ALL

## 2023-02-15 SDOH — ECONOMIC STABILITY: FOOD INSECURITY: WITHIN THE PAST 12 MONTHS, YOU WORRIED THAT YOUR FOOD WOULD RUN OUT BEFORE YOU GOT MONEY TO BUY MORE.: NEVER TRUE

## 2023-02-15 SDOH — ECONOMIC STABILITY: HOUSING INSECURITY
IN THE LAST 12 MONTHS, WAS THERE A TIME WHEN YOU DID NOT HAVE A STEADY PLACE TO SLEEP OR SLEPT IN A SHELTER (INCLUDING NOW)?: NO

## 2023-02-15 ASSESSMENT — ENCOUNTER SYMPTOMS
SINUS PRESSURE: 0
SHORTNESS OF BREATH: 0
ABDOMINAL PAIN: 0
CHEST TIGHTNESS: 0
DIARRHEA: 0
VISUAL CHANGE: 0
COUGH: 1
SINUS PAIN: 0
SORE THROAT: 0
VOMITING: 0
BACK PAIN: 0
NAUSEA: 0
SWOLLEN GLANDS: 0
CHANGE IN BOWEL HABIT: 0

## 2023-02-15 ASSESSMENT — PATIENT HEALTH QUESTIONNAIRE - PHQ9
SUM OF ALL RESPONSES TO PHQ QUESTIONS 1-9: 0
SUM OF ALL RESPONSES TO PHQ9 QUESTIONS 1 & 2: 0
2. FEELING DOWN, DEPRESSED OR HOPELESS: 0
SUM OF ALL RESPONSES TO PHQ QUESTIONS 1-9: 0
1. LITTLE INTEREST OR PLEASURE IN DOING THINGS: 0

## 2023-02-15 ASSESSMENT — VISUAL ACUITY: OU: 1

## 2023-02-15 NOTE — LETTER
[facility]  NOTIFICATION RETURN TO WORK / SCHOOL  [address] [address]  Phone: [phone]  Fax: [phone]    February 15, 2023     Patient: Brittany Wilkins II   Date of Visit: 2/15/2023       To Whom it May Concern:    Brittany Wilkins II was tested for COVID-19 on 2/15/2023 and the result was positive. Patient's symptoms first started on February 12. Due to new CDC Guidelines, Patient will need to quarantine for 5 days from symptom onset. If the patient is asymptomatic or if symptoms are resolving and no fevers for 24 hours, without fever reducing medication such as Tylenol or Motrin, then patient is able to return to work on February 18 with strict mask policy for 5 additional days to minimize risk of infecting people they encounter. Otherwise, patient may return to work if fever free for 24 hours on on 2/23. Please refer to the [facility] website for further information or clarification. If there are questions or concerns, please have the patient contact our office.       Sincerely,    Electronically signed by CHRISTIANO Aquino on 2/15/2023 at 8:52 AM

## 2023-02-15 NOTE — PROGRESS NOTES
[address] Encounter  CHIEF COMPLAINT       Chief Complaint   Patient presents with    Positive For Covid-19    Cough    Nasal Congestion     X 3 days. Taking NightQuil    Generalized Body Aches       HISTORY OF PRESENT ILLNESS   Sarah Lim II is a 47 y.o. male who presents with:  Positive For Covid-19  This is a new problem. Episode onset: today. The problem has been unchanged. Associated symptoms include chills, congestion, coughing, a fever (monday only), headaches and myalgias. Pertinent negatives include no abdominal pain, anorexia, arthralgias, change in bowel habit, chest pain, diaphoresis, fatigue, joint swelling, nausea, neck pain, numbness, rash, sore throat, swollen glands, urinary symptoms, vertigo, visual change, vomiting or weakness. He has tried NSAIDs (nyquil) for the symptoms. The treatment provided mild relief. Tested positive at work. REVIEW OF SYSTEMS     Review of Systems   Constitutional:  Positive for chills and fever (monday only). Negative for activity change, appetite change, diaphoresis and fatigue. HENT:  Positive for congestion. Negative for drooling, sinus pressure, sinus pain and sore throat. Eyes:  Negative for visual disturbance. Respiratory:  Positive for cough. Negative for chest tightness and shortness of breath. Cardiovascular:  Negative for chest pain. Gastrointestinal:  Negative for abdominal pain, anorexia, change in bowel habit, diarrhea, nausea and vomiting. Endocrine: Negative for cold intolerance. Genitourinary:  Negative for dysuria, flank pain, frequency and hematuria. Musculoskeletal:  Positive for myalgias. Negative for arthralgias, back pain, joint swelling and neck pain. Skin:  Negative for rash. Allergic/Immunologic: Negative for food allergies. Neurological:  Positive for headaches. Negative for vertigo, weakness, light-headedness and numbness.    Hematological:  Does not bruise/bleed easily. PAST MEDICAL HISTORY         Diagnosis Date    Diabetes mellitus ([facility] [address]     Kidney stone     Pneumonia     Sleep apnea     Syncope and collapse 5/1/2018    Vasovagal syncope 5/1/2018     SURGICAL HISTORY     Patient  has a past surgical history that includes Cataract removal with implant (Bilateral, 2015) and IR GUIDED THROMB [address] (9/10/2021). CURRENT MEDICATIONS       Previous Medications    ACETAMINOPHEN (TYLENOL) 500 MG TABLET    Take 1,000 mg by mouth every 8 hours as needed    ATENOLOL (TENORMIN) 25 MG TABLET    Take 1 tablet by mouth daily    ATORVASTATIN (LIPITOR) 80 MG TABLET    Take 1 tablet by mouth daily    CLOPIDOGREL (PLAVIX) 75 MG TABLET    Take 1 tablet by mouth daily    CPAP MACHINE MISC    by Does not apply route    RIVAROXABAN (XARELTO) 20 MG TABS TABLET    take 1 tablet by mouth once daily WITH BREAKFAST     ALLERGIES     Patient is has No Known Allergies. FAMILY HISTORY     Patient'sfamily history includes Diabetes in his father; Other in his mother. SOCIAL HISTORY     Patient  reports that he quit smoking about 17 months ago. His smoking use included cigars and cigarettes. He has a 7.50 pack-year smoking history. He has never used smokeless tobacco. He reports that he does not drink alcohol and does not use drugs. PHYSICAL EXAM     VITALS  BP: 112/72, Temp: 98 °F (36.7 °C), Heart Rate: 61,  , SpO2: 94 %  Physical Exam  Vitals and nursing note reviewed. Constitutional:       General: He is awake. He is not in acute distress. Appearance: Normal appearance. He is well-developed. He is not ill-appearing, toxic-appearing or diaphoretic. HENT:      Head: Normocephalic and atraumatic. Right Ear: Hearing and external ear normal.      Left Ear: Hearing and external ear normal.      Nose: Nose normal.   Eyes:      General: Lids are normal. Vision grossly intact. Gaze aligned appropriately.       Conjunctiva/sclera: Conjunctivae normal.      Pupils: Pupils are equal, round, and reactive to light. Cardiovascular:      Rate and Rhythm: Normal rate and regular rhythm. Pulses: Normal pulses. Heart sounds: Normal heart sounds, S1 normal and S2 normal.   Pulmonary:      Effort: Pulmonary effort is normal.      Breath sounds: Normal breath sounds and air entry. Musculoskeletal:      Cervical back: Normal range of motion. Skin:     General: Skin is warm. Capillary Refill: Capillary refill takes less than 2 seconds. Neurological:      Mental Status: He is alert and oriented to person, place, and time. Gait: Gait is intact. Psychiatric:         Attention and Perception: Attention normal.         Mood and Affect: Mood normal.         Speech: Speech normal.         Behavior: Behavior normal. Behavior is cooperative. READY CARE COURSE   Labs:  No results found for this visit on 02/15/23. IMAGING:  No orders to display     Scheduled Meds:  Continuous Infusions:  PRN Meds:. PROCEDURES:  FINAL IMPRESSION      1. Suspected COVID-19 virus infection    2. COVID-19      DISPOSITION/PLAN     Patient is positive for COVID-19. No SOB or chest pain. Unable to start paxlovid due to multiple drug interactions. Currently on anticoagulants. Will start molnupiravir at this time. Went over possible s/e of the medications. Patient would like to proceed with therapy. Discussed signs and symptoms which require immediate follow-up in ED/call to 911. Patient verbalized understanding. Went over quarantine procedures with patient. Work note provided. On this date 2/15/2023 I have spent 20 minutes reviewing previous notes, test results and face to face with the patient discussing the diagnosis and importance of compliance with the treatment plan as well as documenting on the day of the visit. PATIENT REFERRED TO:  Return if symptoms worsen or fail to improve.     DISCHARGE MEDICATIONS:  New Prescriptions    MOLNUPIRAVIR 200 MG CAPSULE    Take 4 capsules by mouth in the morning and 4 capsules in the evening. Do all this for 5 days. Cannot display discharge medications since this is not an admission.        Patria [facility]

## 2023-03-21 RX ORDER — ATENOLOL 25 MG/1
TABLET ORAL
Qty: 90 TABLET | Refills: 1 | Status: SHIPPED | OUTPATIENT
Start: 2023-03-21

## 2023-03-21 NOTE — TELEPHONE ENCOUNTER
Requesting medication refill. Please approve or deny this request.    Rx requested:  Requested Prescriptions     Pending Prescriptions Disp Refills    atenolol (TENORMIN) 25 MG tablet [Pharmacy Med Name: ATENOLOL 25 MG TABLET] 90 tablet 1     Sig: TAKE 1 TABLET BY MOUTH EVERY DAY         Last Office Visit:   11/9/2022      Next Visit Date:  Future Appointments   Date Time Provider Cash Alcantar   11/13/2023  3:20 PM Levine Oas, [facility]               Please approve or deny.

## 2023-04-17 RX ORDER — ASPIRIN 81 MG/1
TABLET, COATED ORAL
Qty: 90 TABLET | Refills: 3 | Status: SHIPPED | OUTPATIENT
Start: 2023-04-17

## 2023-04-17 NOTE — TELEPHONE ENCOUNTER
Requesting medication refill. Please approve or deny this request.    Rx requested:  Requested Prescriptions     Pending Prescriptions Disp Refills    ASPIRIN LOW DOSE 81 MG EC tablet [Pharmacy Med Name: ASPIRIN EC 81 MG TABLET] 90 tablet 3     Sig: TAKE 1 TABLET BY MOUTH EVERY DAY         Last Office Visit:   11/9/2022      Next Visit Date:  Future Appointments   Date Time Provider Cash Alcantar   11/13/2023  3:20 PM Mar March,  [facility]               Please approve or deny.

## 2023-05-08 DIAGNOSIS — I73.9 PAD (PERIPHERAL ARTERY DISEASE) (HCC): Primary | ICD-10-CM

## 2023-05-09 RX ORDER — ATORVASTATIN CALCIUM 80 MG/1
TABLET, FILM COATED ORAL
Qty: 90 TABLET | Refills: 1 | Status: SHIPPED | OUTPATIENT
Start: 2023-05-09

## 2023-05-09 NOTE — TELEPHONE ENCOUNTER
Requesting medication refill. Please approve or deny this request.    Rx requested:  Requested Prescriptions     Pending Prescriptions Disp Refills    atorvastatin (LIPITOR) 80 MG tablet [Pharmacy Med Name: ATORVASTATIN 80 MG TABLET] 90 tablet 1     Sig: TAKE 1 TABLET BY MOUTH EVERY DAY         Last Office Visit:   11/9/2022      Next Visit Date:  Future Appointments   Date Time Provider Cash Alcantar   11/13/2023  3:20 PM Dorian Birmingham, [facility]               Please approve or deny.

## 2023-06-09 ENCOUNTER — OFFICE VISIT (OUTPATIENT)
Dept: FAMILY MEDICINE CLINIC | Age: 53
End: 2023-06-09
Payer: COMMERCIAL

## 2023-06-09 VITALS
HEART RATE: 68 BPM | OXYGEN SATURATION: 94 % | TEMPERATURE: 98.4 F | SYSTOLIC BLOOD PRESSURE: 138 MMHG | BODY MASS INDEX: 37.76 KG/M2 | DIASTOLIC BLOOD PRESSURE: 80 MMHG | WEIGHT: 278.8 LBS | HEIGHT: 72 IN

## 2023-06-09 DIAGNOSIS — R53.83 OTHER FATIGUE: ICD-10-CM

## 2023-06-09 DIAGNOSIS — E66.01 SEVERE OBESITY (BMI 35.0-39.9) WITH COMORBIDITY (HCC): ICD-10-CM

## 2023-06-09 DIAGNOSIS — D68.59 HYPERCOAGULABLE STATE (HCC): Primary | ICD-10-CM

## 2023-06-09 DIAGNOSIS — E11.9 TYPE 2 DIABETES MELLITUS WITHOUT COMPLICATION, WITHOUT LONG-TERM CURRENT USE OF INSULIN (HCC): ICD-10-CM

## 2023-06-09 DIAGNOSIS — I73.9 PAD (PERIPHERAL ARTERY DISEASE) (HCC): ICD-10-CM

## 2023-06-09 DIAGNOSIS — I25.118 CORONARY ARTERY DISEASE INVOLVING NATIVE CORONARY ARTERY OF NATIVE HEART WITH OTHER FORM OF ANGINA PECTORIS (HCC): ICD-10-CM

## 2023-06-09 PROCEDURE — 99214 OFFICE O/P EST MOD 30 MIN: CPT | Performed by: FAMILY MEDICINE

## 2023-06-09 RX ORDER — METFORMIN HYDROCHLORIDE 500 MG/1
1000 TABLET, EXTENDED RELEASE ORAL
Qty: 180 TABLET | Refills: 1 | Status: SHIPPED | OUTPATIENT
Start: 2023-06-09

## 2023-06-09 NOTE — PROGRESS NOTES
(36.9 °C)   Ht 6' (1.829 m)   Wt 278 lb 12.8 oz (126.5 kg)   SpO2 94%   BMI 37.81 kg/m²     Gen: Well, NAD, Alert, Oriented x 3   HEENT: EOMI, eyes clear, MMM  Skin: multiple skin tags on neck  Neck: no significant lymphadenopathy or thyromegaly  Lungs: CTA B w/out Rales/Wheezes/Rhonchi, Good respiratory effort   Heart: RRR, S1S2, w/out M/R/G, non-displaced PMI    Ext: No C/C/E Bilaterally. Neuro: Neurovascularly intact w/ Sensory/Motor intact UE/LE Bilaterally. Foot exam done elswhere    Lab Results   Component Value Date    WBC 13.1 (H) 09/29/2022    HGB 14.4 09/29/2022    HCT 43.4 09/29/2022     09/29/2022    CHOL 215 (H) 03/16/2022    TRIG 349 (H) 03/16/2022    HDL 28 (L) 03/16/2022    ALT 19 03/16/2022    AST 14 03/16/2022     10/20/2022    K 4.2 10/20/2022     10/20/2022    CREATININE 1.00 10/20/2022    BUN 15 10/20/2022    CO2 30 10/20/2022    TSH 1.500 02/14/2019    PSA 0.50 03/16/2022    INR 1.0 08/26/2021    LABA1C 8.0 (H) 11/29/2022    LABMICR <1.20 03/16/2022         A&P   Diagnosis Orders   1. Hypercoagulable state ([facility] [address]        2. PAD (peripheral artery disease) ([facility] [address]        3. Type 2 diabetes mellitus without complication, without long-term current use of insulin ([facility])  Lipid Panel    Comprehensive Metabolic Panel    CBC    Hemoglobin A1C    Microalbumin / Creatinine Urine Ratio    metFORMIN (GLUCOPHAGE-XR) 500 MG extended release tablet      4. Coronary artery disease involving native coronary artery of native heart with other form of angina pectoris (HCC)        5. Other fatigue        6. Severe obesity (BMI 35.0-39. 9) with comorbidity ([facility] [address]              Compliant with CPAP, benefits from use    Check a1c    Overall doing well     Wt Readings from Last 3 Encounters:   06/09/23 278 lb 12.8 oz (126.5 kg)   02/15/23 276 lb (125.2 kg)   12/01/22 274 lb (124.3 kg)         Shaun Forde MD

## 2023-06-13 DIAGNOSIS — R53.83 OTHER FATIGUE: ICD-10-CM

## 2023-06-13 DIAGNOSIS — E11.9 TYPE 2 DIABETES MELLITUS WITHOUT COMPLICATION, WITHOUT LONG-TERM CURRENT USE OF INSULIN (HCC): ICD-10-CM

## 2023-06-13 LAB
ALBUMIN SERPL-MCNC: 3.8 G/DL (ref 3.5–4.6)
ALP SERPL-CCNC: 84 U/L (ref 35–104)
ALT SERPL-CCNC: 22 U/L (ref 0–41)
ANION GAP SERPL CALCULATED.3IONS-SCNC: 9 MEQ/L (ref 9–15)
AST SERPL-CCNC: 19 U/L (ref 0–40)
BILIRUB SERPL-MCNC: 0.5 MG/DL (ref 0.2–0.7)
BUN SERPL-MCNC: 14 MG/DL (ref 6–20)
CALCIUM SERPL-MCNC: 9.4 MG/DL (ref 8.5–9.9)
CHLORIDE SERPL-SCNC: 101 MEQ/L (ref 95–107)
CHOLEST SERPL-MCNC: 122 MG/DL (ref 0–199)
CO2 SERPL-SCNC: 29 MEQ/L (ref 20–31)
CREAT SERPL-MCNC: 1.02 MG/DL (ref 0.7–1.2)
CREAT UR-MCNC: 184.5 MG/DL
ERYTHROCYTE [DISTWIDTH] IN BLOOD BY AUTOMATED COUNT: 13.8 % (ref 11.5–14.5)
GLOBULIN SER CALC-MCNC: 2.9 G/DL (ref 2.3–3.5)
GLUCOSE SERPL-MCNC: 147 MG/DL (ref 70–99)
HBA1C MFR BLD: 9 % (ref 4.8–5.9)
HCT VFR BLD AUTO: 48.7 % (ref 42–52)
HDLC SERPL-MCNC: 24 MG/DL (ref 40–59)
HGB BLD-MCNC: 16.4 G/DL (ref 14–18)
LDLC SERPL CALC-MCNC: 65 MG/DL (ref 0–129)
MCH RBC QN AUTO: 33.6 PG (ref 27–31.3)
MCHC RBC AUTO-ENTMCNC: 33.8 % (ref 33–37)
MCV RBC AUTO: 99.4 FL (ref 79–92.2)
MICROALBUMIN UR-MCNC: <1.2 MG/DL
MICROALBUMIN/CREAT UR-RTO: NORMAL MG/G (ref 0–30)
PLATELET # BLD AUTO: 177 K/UL (ref 130–400)
POTASSIUM SERPL-SCNC: 5 MEQ/L (ref 3.4–4.9)
PROT SERPL-MCNC: 6.7 G/DL (ref 6.3–8)
RBC # BLD AUTO: 4.9 M/UL (ref 4.7–6.1)
SODIUM SERPL-SCNC: 139 MEQ/L (ref 135–144)
TRIGL SERPL-MCNC: 167 MG/DL (ref 0–150)
TSH SERPL-MCNC: 1.97 UIU/ML (ref 0.44–3.86)
WBC # BLD AUTO: 7.4 K/UL (ref 4.8–10.8)

## 2023-07-10 ENCOUNTER — OFFICE VISIT (OUTPATIENT)
Dept: CARDIOLOGY CLINIC | Age: 53
End: 2023-07-10
Payer: COMMERCIAL

## 2023-07-10 VITALS
SYSTOLIC BLOOD PRESSURE: 132 MMHG | DIASTOLIC BLOOD PRESSURE: 70 MMHG | HEIGHT: 72 IN | BODY MASS INDEX: 37.33 KG/M2 | OXYGEN SATURATION: 95 % | HEART RATE: 68 BPM | WEIGHT: 275.6 LBS

## 2023-07-10 DIAGNOSIS — I73.9 PAD (PERIPHERAL ARTERY DISEASE) (HCC): Primary | ICD-10-CM

## 2023-07-10 PROCEDURE — 99214 OFFICE O/P EST MOD 30 MIN: CPT | Performed by: INTERNAL MEDICINE

## 2023-07-10 PROCEDURE — 93000 ELECTROCARDIOGRAM COMPLETE: CPT | Performed by: INTERNAL MEDICINE

## 2023-07-10 NOTE — PROGRESS NOTES
7/10/2023    Octaviano Soni MD  [address] [place] [address], [address]  [facility] [address]    RE: Tj Espinoza RENETTA   : 1970     Dear Dr. Brent Kaplan : As you are well aware, Mr. Todd Abad is a pleasant 47 y.o.  male who returns today fo follow up of severe PAD status post  with 5 total covered stents to the infrarenal abdominal aorta as well as bilateral iliac bifurcations and severe single vessel CAD status post stents x 3 to the RCA. There is residual 60-70% mid Circumflex stenosis and 50-60% mid LAD stenosis, both lesions non obstructive by iFR. Currently he reports feeling \"better\". He denies any chest pain or limiting exertional dyspnea day-to-day activities. He is looking to start exercising with his wife in the near future. He reports rare palpitations but no near-syncope or syncope. He has been compliant with medications including aspirin, Plavix, and Xarelto without any interruption. 7/10/23: Patient with complaints of right leg claudication and discoloration. He has known severe PAD status post  with 5 total covered stents to the infrarenal abdominal aorta as well as bilateral iliac bifurcations. He now reports right leg claudication with ambulation over the last month which is similar to what he had before the stents. The pain in in his posterior calf and anterior thigh. He reports leg edema and mild darkening along the shins and forefoot. He reports compliance with medications including Plavix and Xarelto. He continues to smoke \"occasionally\". He is also status post severe single vessel CAD status post stents x 3 to the RCA. There is residual 60-70% mid Circumflex stenosis and 50-60% mid LAD stenosis, both lesions non obstructive by iFR. He denies any chest pain or limiting exertional dyspnea. He has rare palpitations but no near syncope or syncope.        Current medications:   Current Outpatient Medications   Medication Sig Dispense Refill    metFORMIN (GLUCOPHAGE-XR) 500 MG extended release

## 2023-07-10 NOTE — PATIENT INSTRUCTIONS
CT abdomen with bilateral lower extremity with run off  Keep legs elevated  Quit smoking!   Follow up with Dr. Moshe Michael in 4 weeks  Follow up with me in 3 months

## 2023-07-18 ENCOUNTER — HOSPITAL ENCOUNTER (OUTPATIENT)
Dept: CT IMAGING | Age: 53
Discharge: HOME | End: 2023-07-20
Attending: INTERNAL MEDICINE
Payer: COMMERCIAL

## 2023-07-18 DIAGNOSIS — I73.9 PAD (PERIPHERAL ARTERY DISEASE) (HCC): ICD-10-CM

## 2023-07-18 PROCEDURE — 6360000004 HC RX CONTRAST MEDICATION: Performed by: INTERNAL MEDICINE

## 2023-07-18 PROCEDURE — 2580000003 HC RX 258: Performed by: INTERNAL MEDICINE

## 2023-07-18 PROCEDURE — 75635 CT ANGIO ABDOMINAL ARTERIES: CPT

## 2023-07-18 RX ORDER — 0.9 % SODIUM CHLORIDE 0.9 %
50 INTRAVENOUS SOLUTION INTRAVENOUS ONCE
Status: COMPLETED | OUTPATIENT
Start: 2023-07-18 | End: 2023-07-18

## 2023-07-18 RX ADMIN — IOPAMIDOL 125 ML: 612 INJECTION, SOLUTION INTRAVENOUS at 07:46

## 2023-07-18 RX ADMIN — SODIUM CHLORIDE 50 ML: 9 INJECTION, SOLUTION INTRAVENOUS at 07:47

## 2023-08-10 ENCOUNTER — OFFICE VISIT (OUTPATIENT)
Dept: CARDIOLOGY CLINIC | Age: 53
End: 2023-08-10
Payer: COMMERCIAL

## 2023-08-10 VITALS
WEIGHT: 272 LBS | HEART RATE: 78 BPM | DIASTOLIC BLOOD PRESSURE: 80 MMHG | BODY MASS INDEX: 36.89 KG/M2 | OXYGEN SATURATION: 95 % | SYSTOLIC BLOOD PRESSURE: 132 MMHG

## 2023-08-10 DIAGNOSIS — E66.01 SEVERE OBESITY (BMI 35.0-39.9) WITH COMORBIDITY (HCC): Primary | ICD-10-CM

## 2023-08-10 DIAGNOSIS — I73.9 CLAUDICATION (HCC): ICD-10-CM

## 2023-08-10 DIAGNOSIS — I25.118 CORONARY ARTERY DISEASE INVOLVING NATIVE CORONARY ARTERY OF NATIVE HEART WITH OTHER FORM OF ANGINA PECTORIS (HCC): ICD-10-CM

## 2023-08-10 PROCEDURE — 99214 OFFICE O/P EST MOD 30 MIN: CPT | Performed by: INTERNAL MEDICINE

## 2023-08-10 RX ORDER — PREDNISONE 5 MG/1
50 TABLET ORAL ONCE
OUTPATIENT
Start: 2023-08-10 | End: 2023-08-10

## 2023-08-10 RX ORDER — ASPIRIN 81 MG/1
81 TABLET ORAL ONCE
OUTPATIENT
Start: 2023-08-10 | End: 2023-08-10

## 2023-08-10 RX ORDER — SODIUM CHLORIDE 9 MG/ML
INJECTION, SOLUTION INTRAVENOUS PRN
OUTPATIENT
Start: 2023-08-10

## 2023-08-10 RX ORDER — SODIUM CHLORIDE 0.9 % (FLUSH) 0.9 %
5-40 SYRINGE (ML) INJECTION EVERY 12 HOURS SCHEDULED
OUTPATIENT
Start: 2023-08-10

## 2023-08-10 RX ORDER — DIPHENHYDRAMINE HCL 25 MG
50 TABLET ORAL ONCE
OUTPATIENT
Start: 2023-08-10 | End: 2023-08-10

## 2023-08-10 RX ORDER — ONDANSETRON 2 MG/ML
4 INJECTION INTRAMUSCULAR; INTRAVENOUS EVERY 6 HOURS PRN
OUTPATIENT
Start: 2023-08-10

## 2023-08-10 RX ORDER — SODIUM CHLORIDE 0.9 % (FLUSH) 0.9 %
5-40 SYRINGE (ML) INJECTION PRN
OUTPATIENT
Start: 2023-08-10

## 2023-08-10 RX ORDER — NITROGLYCERIN 0.4 MG/1
0.4 TABLET SUBLINGUAL EVERY 5 MIN PRN
OUTPATIENT
Start: 2023-08-10

## 2023-08-10 NOTE — PROGRESS NOTES
Chief Complaint   Patient presents with    Results     ABDOMINAL CTA       8-10-23Patient presents for initial medical evaluation. Patient is followed on a regular basis by Dr. Jesús Byrd MD. patient with history of peripheral tear disease status post angiogram in September 2022 with bilateral common and external iliac artery stenting. Status post cardiac catheterization October 2022 with PCI of the mid and distal RCA, negative IFR of the LAD as well as the circumflex. Patient complains of right leg lower extremity edema as well as pain. Status post CTA of the abdomen with lower extremity runoff showing right iliac stent occlusion. Occlusion of the proximal and mid external iliac arteries. With reconstitution of distal right external artery. Two-vessel runoff in the right lower extremity. Patient complains of claudication type symptoms in his right lower extremity. Has numbness in his right foot. Patient continues smoke 1 cigar a day. He is on Xarelto as well as Plavix. Patient with history of right lower extremity DVT. Patient Active Problem List   Diagnosis    Vasovagal syncope    Diabetes mellitus ([address])    Sleep apnea    Acute deep vein thrombosis (DVT) of right femoral vein (HCC)    Acute deep vein thrombosis (DVT) of right popliteal vein (HCC)    Superficial thrombophlebitis of right leg    Post-thrombotic syndrome of right lower extremity    Aching leg syndrome of left lower extremity    Iliac vein stenosis, right    PAD (peripheral artery disease) ([address])    Coronary artery disease involving native coronary artery of native heart with other form of angina pectoris ([address])    Hypercoagulable state ([address])    Severe obesity (BMI 35.0-39. 9) with comorbidity [facility])       Past Surgical History:   Procedure Laterality Date    CATARACT REMOVAL WITH IMPLANT Bilateral 2015    IR THROMB [facility] VEIN  9/10/2021    IR THROMB [facility] VEIN 9/10/2021 [facility] SPECIAL PROCEDURE       Social History     Socioeconomic

## 2023-08-14 NOTE — TELEPHONE ENCOUNTER
Requesting medication refill. Please approve or deny this request.    Rx requested:  Requested Prescriptions     Pending Prescriptions Disp Refills    clopidogrel (PLAVIX) 75 MG tablet [Pharmacy Med Name: CLOPIDOGREL 75 MG TABLET] 90 tablet 3     Sig: TAKE 1 TABLET BY MOUTH EVERY DAY         Last Office Visit:   8/10/2023      Next Visit Date:  Future Appointments   Date Time Provider [address]   10/11/2023  4:00 PM DO Farooq Oneal [facility]   11/13/2023  3:20 PM DO Farooq Oneal [facility] EMERGENCY [facility] AT [place]   12/4/2023  8:30 AM Jesse Owens MD [facility]               Last refill 9/29/22. Please approve or deny.

## 2023-08-17 RX ORDER — CLOPIDOGREL BISULFATE 75 MG/1
TABLET ORAL
Qty: 90 TABLET | Refills: 3 | Status: SHIPPED | OUTPATIENT
Start: 2023-08-17

## 2023-08-21 DIAGNOSIS — D68.59 HYPERCOAGULABLE STATE (HCC): ICD-10-CM

## 2023-08-21 DIAGNOSIS — I73.9 PAD (PERIPHERAL ARTERY DISEASE) (HCC): ICD-10-CM

## 2023-08-22 NOTE — TELEPHONE ENCOUNTER
Requesting medication refill.  Please approve or deny this request.    Rx requested:  Requested Prescriptions     Pending Prescriptions Disp Refills    atorvastatin (LIPITOR) 80 MG tablet 90 tablet 3     Sig: Take 1 tablet by mouth daily    atenolol (TENORMIN) 25 MG tablet 90 tablet 3     Sig: Take 1 tablet by mouth daily    clopidogrel (PLAVIX) 75 MG tablet 90 tablet 3     Sig: Take 1 tablet by mouth daily    rivaroxaban (XARELTO) 20 MG TABS tablet 90 tablet 3     Sig: take 1 tablet by mouth once daily WITH BREAKFAST         Last Office Visit:   7/10/2023      Next Visit Date:  Future Appointments   Date Time Provider [address]   10/11/2023  4:00 PM Brian Hilario DO [facility]   11/13/2023  3:20 PM Brian Hilario [address] [place] [address]   12/4/2023  8:30 AM Bro Greenfield MD [facility] Stefanie Unger

## 2023-08-24 RX ORDER — CLOPIDOGREL BISULFATE 75 MG/1
75 TABLET ORAL DAILY
Qty: 90 TABLET | Refills: 3 | Status: SHIPPED | OUTPATIENT
Start: 2023-08-24

## 2023-08-24 RX ORDER — ATENOLOL 25 MG/1
25 TABLET ORAL DAILY
Qty: 90 TABLET | Refills: 3 | Status: SHIPPED | OUTPATIENT
Start: 2023-08-24

## 2023-08-24 RX ORDER — ATORVASTATIN CALCIUM 80 MG/1
80 TABLET, FILM COATED ORAL DAILY
Qty: 90 TABLET | Refills: 3 | Status: SHIPPED | OUTPATIENT
Start: 2023-08-24

## 2023-08-30 ENCOUNTER — TELEPHONE (OUTPATIENT)
Dept: CARDIOLOGY CLINIC | Age: 53
End: 2023-08-30

## 2023-08-30 ENCOUNTER — HOSPITAL ENCOUNTER (OUTPATIENT)
Age: 53
Setting detail: OUTPATIENT SURGERY
Discharge: HOME | End: 2023-08-30
Attending: INTERNAL MEDICINE | Admitting: INTERNAL MEDICINE
Payer: COMMERCIAL

## 2023-08-30 VITALS
TEMPERATURE: 98.2 F | RESPIRATION RATE: 24 BRPM | OXYGEN SATURATION: 90 % | BODY MASS INDEX: 36.84 KG/M2 | HEART RATE: 78 BPM | DIASTOLIC BLOOD PRESSURE: 80 MMHG | HEIGHT: 72 IN | SYSTOLIC BLOOD PRESSURE: 137 MMHG | WEIGHT: 272 LBS

## 2023-08-30 DIAGNOSIS — I73.9 PAD (PERIPHERAL ARTERY DISEASE) (HCC): Primary | ICD-10-CM

## 2023-08-30 DIAGNOSIS — I73.9 CLAUDICATION (HCC): ICD-10-CM

## 2023-08-30 LAB
ANION GAP SERPL CALCULATED.3IONS-SCNC: 11 MEQ/L (ref 9–15)
BUN SERPL-MCNC: 13 MG/DL (ref 6–20)
CALCIUM SERPL-MCNC: 8.9 MG/DL (ref 8.5–9.9)
CHLORIDE SERPL-SCNC: 103 MEQ/L (ref 95–107)
CO2 SERPL-SCNC: 24 MEQ/L (ref 20–31)
CREAT SERPL-MCNC: 0.9 MG/DL (ref 0.7–1.2)
ECHO BSA: 2.5 M2
ERYTHROCYTE [DISTWIDTH] IN BLOOD BY AUTOMATED COUNT: 13.6 % (ref 11.5–14.5)
GLUCOSE SERPL-MCNC: 202 MG/DL (ref 70–99)
HCT VFR BLD AUTO: 49.3 % (ref 42–52)
HGB BLD-MCNC: 16.7 G/DL (ref 14–18)
MCH RBC QN AUTO: 34 PG (ref 27–31.3)
MCHC RBC AUTO-ENTMCNC: 34 % (ref 33–37)
MCV RBC AUTO: 100.1 FL (ref 79–92.2)
PLATELET # BLD AUTO: 154 K/UL (ref 130–400)
POTASSIUM SERPL-SCNC: 4.4 MEQ/L (ref 3.4–4.9)
RBC # BLD AUTO: 4.92 M/UL (ref 4.7–6.1)
SODIUM SERPL-SCNC: 138 MEQ/L (ref 135–144)
WBC # BLD AUTO: 9 K/UL (ref 4.8–10.8)

## 2023-08-30 PROCEDURE — 6370000000 HC RX 637 (ALT 250 FOR IP): Performed by: INTERNAL MEDICINE

## 2023-08-30 PROCEDURE — 2709999900 HC NON-CHARGEABLE SUPPLY: Performed by: INTERNAL MEDICINE

## 2023-08-30 PROCEDURE — 80048 BASIC METABOLIC PNL TOTAL CA: CPT

## 2023-08-30 PROCEDURE — C1769 GUIDE WIRE: HCPCS | Performed by: INTERNAL MEDICINE

## 2023-08-30 PROCEDURE — 7100000010 HC PHASE II RECOVERY - FIRST 15 MIN: Performed by: INTERNAL MEDICINE

## 2023-08-30 PROCEDURE — 75716 ARTERY X-RAYS ARMS/LEGS: CPT | Performed by: INTERNAL MEDICINE

## 2023-08-30 PROCEDURE — 2580000003 HC RX 258: Performed by: INTERNAL MEDICINE

## 2023-08-30 PROCEDURE — 7100000011 HC PHASE II RECOVERY - ADDTL 15 MIN: Performed by: INTERNAL MEDICINE

## 2023-08-30 PROCEDURE — 6360000002 HC RX W HCPCS: Performed by: INTERNAL MEDICINE

## 2023-08-30 PROCEDURE — 6360000004 HC RX CONTRAST MEDICATION: Performed by: INTERNAL MEDICINE

## 2023-08-30 PROCEDURE — C1894 INTRO/SHEATH, NON-LASER: HCPCS | Performed by: INTERNAL MEDICINE

## 2023-08-30 PROCEDURE — 2500000003 HC RX 250 WO HCPCS: Performed by: INTERNAL MEDICINE

## 2023-08-30 PROCEDURE — 99152 MOD SED SAME PHYS/QHP 5/>YRS: CPT | Performed by: INTERNAL MEDICINE

## 2023-08-30 PROCEDURE — 85027 COMPLETE CBC AUTOMATED: CPT

## 2023-08-30 RX ORDER — SODIUM CHLORIDE 0.9 % (FLUSH) 0.9 %
5-40 SYRINGE (ML) INJECTION EVERY 12 HOURS SCHEDULED
Status: DISCONTINUED | OUTPATIENT
Start: 2023-08-30 | End: 2023-08-30 | Stop reason: HOSPADM

## 2023-08-30 RX ORDER — DIPHENHYDRAMINE HCL 25 MG
50 TABLET ORAL ONCE
Status: DISCONTINUED | OUTPATIENT
Start: 2023-08-30 | End: 2023-08-30 | Stop reason: HOSPADM

## 2023-08-30 RX ORDER — SODIUM CHLORIDE 9 MG/ML
INJECTION, SOLUTION INTRAVENOUS CONTINUOUS
Status: DISCONTINUED | OUTPATIENT
Start: 2023-08-30 | End: 2023-08-30 | Stop reason: HOSPADM

## 2023-08-30 RX ORDER — SODIUM CHLORIDE 9 MG/ML
INJECTION, SOLUTION INTRAVENOUS PRN
Status: DISCONTINUED | OUTPATIENT
Start: 2023-08-30 | End: 2023-08-30 | Stop reason: HOSPADM

## 2023-08-30 RX ORDER — CLOPIDOGREL BISULFATE 75 MG/1
75 TABLET ORAL ONCE
Status: COMPLETED | OUTPATIENT
Start: 2023-08-30 | End: 2023-08-30

## 2023-08-30 RX ORDER — LIDOCAINE HYDROCHLORIDE 10 MG/ML
INJECTION, SOLUTION INFILTRATION; PERINEURAL PRN
Status: DISCONTINUED | OUTPATIENT
Start: 2023-08-30 | End: 2023-08-30 | Stop reason: HOSPADM

## 2023-08-30 RX ORDER — MIDAZOLAM HYDROCHLORIDE 2 MG/2ML
2 INJECTION, SOLUTION INTRAMUSCULAR; INTRAVENOUS
Status: DISCONTINUED | OUTPATIENT
Start: 2023-08-30 | End: 2023-08-30 | Stop reason: HOSPADM

## 2023-08-30 RX ORDER — ONDANSETRON 2 MG/ML
4 INJECTION INTRAMUSCULAR; INTRAVENOUS EVERY 6 HOURS PRN
Status: DISCONTINUED | OUTPATIENT
Start: 2023-08-30 | End: 2023-08-30 | Stop reason: HOSPADM

## 2023-08-30 RX ORDER — LABETALOL HYDROCHLORIDE 5 MG/ML
10 INJECTION, SOLUTION INTRAVENOUS EVERY 30 MIN PRN
Status: DISCONTINUED | OUTPATIENT
Start: 2023-08-30 | End: 2023-08-30 | Stop reason: HOSPADM

## 2023-08-30 RX ORDER — ASPIRIN 81 MG/1
81 TABLET ORAL ONCE
Status: DISCONTINUED | OUTPATIENT
Start: 2023-08-30 | End: 2023-08-30 | Stop reason: HOSPADM

## 2023-08-30 RX ORDER — FENTANYL CITRATE 0.05 MG/ML
25 INJECTION, SOLUTION INTRAMUSCULAR; INTRAVENOUS
Status: DISCONTINUED | OUTPATIENT
Start: 2023-08-30 | End: 2023-08-30 | Stop reason: HOSPADM

## 2023-08-30 RX ORDER — MIDAZOLAM HYDROCHLORIDE 1 MG/ML
INJECTION INTRAMUSCULAR; INTRAVENOUS PRN
Status: DISCONTINUED | OUTPATIENT
Start: 2023-08-30 | End: 2023-08-30 | Stop reason: HOSPADM

## 2023-08-30 RX ORDER — PREDNISONE 50 MG/1
50 TABLET ORAL ONCE
Status: DISCONTINUED | OUTPATIENT
Start: 2023-08-30 | End: 2023-08-30 | Stop reason: HOSPADM

## 2023-08-30 RX ORDER — NITROGLYCERIN 0.4 MG/1
0.4 TABLET SUBLINGUAL EVERY 5 MIN PRN
Status: DISCONTINUED | OUTPATIENT
Start: 2023-08-30 | End: 2023-08-30 | Stop reason: HOSPADM

## 2023-08-30 RX ORDER — ACETAMINOPHEN 325 MG/1
650 TABLET ORAL EVERY 4 HOURS PRN
Status: DISCONTINUED | OUTPATIENT
Start: 2023-08-30 | End: 2023-08-30 | Stop reason: HOSPADM

## 2023-08-30 RX ORDER — MORPHINE SULFATE 2 MG/ML
2 INJECTION, SOLUTION INTRAMUSCULAR; INTRAVENOUS
Status: DISCONTINUED | OUTPATIENT
Start: 2023-08-30 | End: 2023-08-30 | Stop reason: HOSPADM

## 2023-08-30 RX ORDER — SODIUM CHLORIDE 0.9 % (FLUSH) 0.9 %
5-40 SYRINGE (ML) INJECTION PRN
Status: DISCONTINUED | OUTPATIENT
Start: 2023-08-30 | End: 2023-08-30 | Stop reason: HOSPADM

## 2023-08-30 RX ORDER — ONDANSETRON 2 MG/ML
4 INJECTION INTRAMUSCULAR; INTRAVENOUS EVERY 6 HOURS PRN
Status: DISCONTINUED | OUTPATIENT
Start: 2023-08-30 | End: 2023-08-30 | Stop reason: SDUPTHER

## 2023-08-30 RX ADMIN — CLOPIDOGREL BISULFATE 75 MG: 75 TABLET ORAL at 08:26

## 2023-08-30 RX ADMIN — SODIUM CHLORIDE: 9 INJECTION, SOLUTION INTRAVENOUS at 08:27

## 2023-08-30 NOTE — PROGRESS NOTES
Arrived to pre/post from the cath lab and report from [facility]. Left groin is stable with no bleeding or hematoma. Attached to monitor and vitals are stable. Taking fluids.   Dr. Joshua Code reviewed results with patient and family

## 2023-08-30 NOTE — TELEPHONE ENCOUNTER
----- Message from Aurelio Fox DO sent at 8/30/2023  9:24 AM EDT -----  Referring patient to [facility] vascular surgery, dr Ginny Klinefelter. Please facilitate.      Electronically signed by Aurelio Fox DO on 8/30/2023 at 9:24 AM

## 2023-08-30 NOTE — PROGRESS NOTES
Left groin stable after ambulation. IV dc'd intact.   Discharge instructions given and patient verbalizes understanding

## 2023-08-30 NOTE — TELEPHONE ENCOUNTER
----- Message from Alonzo Hutchins DO sent at 8/30/2023  9:24 AM EDT -----  Referring patient to [facility] vascular surgery, dr Mary Francis. Please facilitate.      Electronically signed by Alonzo Hutchins DO on 8/30/2023 at 9:24 AM

## 2023-08-30 NOTE — BRIEF OP NOTE
Section of Cardiology  Adult Brief LE angio Procedure Note        Procedure(s):  abd aortogram, b/l LE angio    Pre-operative Diagnosis:  claudication, abn CTA    H&P Status: Completed and reviewed. Post-operative Diagnosis:      Abd aortogram: infrarenal abd stents with mild to mod ISR    Right LE: 100% ostial right MICHELLE ISR with recons in EIA    Left LE stents in left MICHELLE with mild ISR, at outflow of stent in EIA 90% focal stenosis, otherwise mild disease. Findings:  See full report    Complications:  none    Primary Proceduralist:   Dr. Zaria Gannon    Refer to vascular surgery for aortobifem bypass.      Full procedure note to follow
2 = assistive person

## 2023-08-30 NOTE — DISCHARGE INSTRUCTIONS
May shower and remove left groin dressing in the morning  No driving for 24 hours  No heavy lifting anything over 5 pounds for 2 days  No excessive bending   Any redness, drainage, increased swelling or fever please call the office  Any bleeding apply pressure and if unable to control the bleeding return to the nearest ER  Resume same medications as at home  Follow up with Dr. Sherin Boast in 2 months, please call for appointment

## 2023-09-04 LAB — ECHO BSA: 2.5 M2

## 2023-09-06 DIAGNOSIS — E11.9 TYPE 2 DIABETES MELLITUS WITHOUT COMPLICATION, WITHOUT LONG-TERM CURRENT USE OF INSULIN (HCC): ICD-10-CM

## 2023-09-06 NOTE — TELEPHONE ENCOUNTER
Requesting medication refill. Please approve or deny this request.    Rx requested:  Requested Prescriptions     Pending Prescriptions Disp Refills    metFORMIN (GLUCOPHAGE-XR) 500 MG extended release tablet [Pharmacy Med Name: METFORMIN HCL  MG TABLET] 180 tablet 1     Sig: TAKE 2 TABLETS BY MOUTH DAILY (WITH BREAKFAST).        Last Office Visit:   6/9/2023    Next Visit Date:  Future Appointments   Date Time Provider [facility] [address]   10/11/2023  4:00 PM DO Farooq Lawson [facility]   11/13/2023  3:20 PM DO Farooq Lawson [facility] [place]   12/4/2023  8:30 AM Keshav Montemayor MD [facility] [place]

## 2023-09-07 ENCOUNTER — TELEPHONE (OUTPATIENT)
Dept: CARDIOLOGY CLINIC | Age: 53
End: 2023-09-07

## 2023-09-07 RX ORDER — METFORMIN HYDROCHLORIDE 500 MG/1
1000 TABLET, EXTENDED RELEASE ORAL
Qty: 180 TABLET | Refills: 1 | Status: SHIPPED | OUTPATIENT
Start: 2023-09-07

## 2023-09-07 NOTE — TELEPHONE ENCOUNTER
Pt calling because [facility] is wanting to do an MRI but need to know what type of stents the pt has?  He said he has several.    [address] [facility],7Gws to leave message with wife

## 2023-09-11 ENCOUNTER — TELEPHONE (OUTPATIENT)
Dept: CARDIOLOGY CLINIC | Age: 53
End: 2023-09-11

## 2023-09-11 NOTE — TELEPHONE ENCOUNTER
Pt wife called because [facility] was supposed to contact us about getting a stress test done before Sept 14th because they are doing a procedure. They are going to try and unclog pt's stents all last effort before having to have a bypass surgery.     Pt # [phone]

## 2023-09-12 ENCOUNTER — HOSPITAL ENCOUNTER (OUTPATIENT)
Dept: NUCLEAR MEDICINE | Age: 53
Discharge: HOME | End: 2023-09-14
Attending: INTERNAL MEDICINE
Payer: COMMERCIAL

## 2023-09-12 ENCOUNTER — HOSPITAL ENCOUNTER (OUTPATIENT)
Age: 53
Discharge: HOME | End: 2023-09-14
Attending: INTERNAL MEDICINE

## 2023-09-12 ENCOUNTER — TELEPHONE (OUTPATIENT)
Dept: CARDIOLOGY CLINIC | Age: 53
End: 2023-09-12

## 2023-09-12 DIAGNOSIS — R94.39 ABNORMAL STRESS TEST: ICD-10-CM

## 2023-09-12 DIAGNOSIS — R94.39 ABNORMAL STRESS TEST: Primary | ICD-10-CM

## 2023-09-12 PROCEDURE — 2580000003 HC RX 258: Performed by: INTERNAL MEDICINE

## 2023-09-12 PROCEDURE — A9502 TC99M TETROFOSMIN: HCPCS | Performed by: INTERNAL MEDICINE

## 2023-09-12 PROCEDURE — 3430000000 HC RX DIAGNOSTIC RADIOPHARMACEUTICAL: Performed by: INTERNAL MEDICINE

## 2023-09-12 PROCEDURE — 6360000002 HC RX W HCPCS: Performed by: INTERNAL MEDICINE

## 2023-09-12 PROCEDURE — 78452 HT MUSCLE IMAGE SPECT MULT: CPT

## 2023-09-12 PROCEDURE — 93017 CV STRESS TEST TRACING ONLY: CPT

## 2023-09-12 RX ORDER — SODIUM CHLORIDE 0.9 % (FLUSH) 0.9 %
10 SYRINGE (ML) INJECTION PRN
Status: COMPLETED | OUTPATIENT
Start: 2023-09-12 | End: 2023-09-12

## 2023-09-12 RX ORDER — REGADENOSON 0.08 MG/ML
0.4 INJECTION, SOLUTION INTRAVENOUS
Status: COMPLETED | OUTPATIENT
Start: 2023-09-12 | End: 2023-09-12

## 2023-09-12 RX ADMIN — Medication 10 ML: at 12:07

## 2023-09-12 RX ADMIN — Medication 10 ML: at 12:08

## 2023-09-12 RX ADMIN — TETROFOSMIN 34.6 MILLICURIE: 1.38 INJECTION, POWDER, LYOPHILIZED, FOR SOLUTION INTRAVENOUS at 12:07

## 2023-09-12 RX ADMIN — TETROFOSMIN 11.8 MILLICURIE: 1.38 INJECTION, POWDER, LYOPHILIZED, FOR SOLUTION INTRAVENOUS at 10:40

## 2023-09-12 RX ADMIN — Medication 10 ML: at 10:40

## 2023-09-12 RX ADMIN — REGADENOSON 0.4 MG: 0.08 INJECTION, SOLUTION INTRAVENOUS at 12:07

## 2023-09-12 NOTE — TELEPHONE ENCOUNTER
----- Message from Libby Fletcher DO sent at 9/12/2023  7:51 AM EDT -----  Patient scheduled for vascular surgery at [facility] on 9-14-23. They would like a stress test prior to his surgery. Please order and schedule a stress test today or tomorrow please ASAP if possible. Sorry for late notice.      Electronically signed by Libby Fletcher DO on 9/12/2023 at 7:52 AM

## 2023-09-13 LAB
NUC STRESS EJECTION FRACTION: 52 %
STRESS BASELINE DIAS BP: 72 MMHG
STRESS BASELINE HR: 59 BPM
STRESS BASELINE ST DEPRESSION: 0 MM
STRESS BASELINE SYS BP: 135 MMHG
STRESS ESTIMATED WORKLOAD: 1 METS
STRESS PEAK DIAS BP: 92 MMHG
STRESS PEAK SYS BP: 151 MMHG
STRESS PERCENT HR ACHIEVED: 50 %
STRESS POST PEAK HR: 83 BPM
STRESS RATE PRESSURE PRODUCT: NORMAL BPM*MMHG
STRESS ST DEPRESSION: 0 MM
STRESS TARGET HR: 167 BPM
TID: 0.99

## 2023-10-11 ENCOUNTER — OFFICE VISIT (OUTPATIENT)
Dept: CARDIOLOGY CLINIC | Age: 53
End: 2023-10-11
Payer: COMMERCIAL

## 2023-10-11 VITALS
HEIGHT: 72 IN | SYSTOLIC BLOOD PRESSURE: 118 MMHG | HEART RATE: 77 BPM | BODY MASS INDEX: 37.11 KG/M2 | DIASTOLIC BLOOD PRESSURE: 72 MMHG | OXYGEN SATURATION: 97 % | WEIGHT: 274 LBS

## 2023-10-11 DIAGNOSIS — I25.118 CORONARY ARTERY DISEASE INVOLVING NATIVE CORONARY ARTERY OF NATIVE HEART WITH OTHER FORM OF ANGINA PECTORIS (HCC): Primary | ICD-10-CM

## 2023-10-11 PROBLEM — J44.9 COPD, SEVERE (HCC): Status: ACTIVE | Noted: 2023-09-15

## 2023-10-11 PROBLEM — I70.209 ARTERIAL OCCLUSION, LOWER EXTREMITY (HCC): Status: ACTIVE | Noted: 2023-09-15

## 2023-10-11 PROBLEM — G89.18 PAIN, POSTOPERATIVE, ACUTE: Status: ACTIVE | Noted: 2023-09-14

## 2023-10-11 PROBLEM — E66.9 OBESITY, CLASS II, BMI 35-39.9: Status: ACTIVE | Noted: 2023-09-08

## 2023-10-11 PROBLEM — E66.812 OBESITY, CLASS II, BMI 35-39.9: Status: ACTIVE | Noted: 2023-09-08

## 2023-10-11 PROBLEM — J43.2 CENTRILOBULAR EMPHYSEMA (HCC): Status: ACTIVE | Noted: 2023-09-15

## 2023-10-11 PROCEDURE — 99214 OFFICE O/P EST MOD 30 MIN: CPT | Performed by: INTERNAL MEDICINE

## 2023-10-11 PROCEDURE — 93000 ELECTROCARDIOGRAM COMPLETE: CPT | Performed by: INTERNAL MEDICINE

## 2023-10-11 RX ORDER — UMECLIDINIUM BROMIDE AND VILANTEROL TRIFENATATE 62.5; 25 UG/1; UG/1
POWDER RESPIRATORY (INHALATION)
COMMUNITY
Start: 2023-09-16

## 2023-10-11 RX ORDER — ALBUTEROL SULFATE 90 UG/1
AEROSOL, METERED RESPIRATORY (INHALATION)
COMMUNITY
Start: 2023-10-11

## 2023-10-11 NOTE — PROGRESS NOTES
10/11/2023    [address], [address]  [facility] [address]    RE: London Tinoco RENETTA   : 1970     Dear Dr. Diaz Neptali : As you are well aware, Mr. Asiya Alva is a pleasant 46 y.o.  male who returns today fo follow up of severe PAD status post  with 5 total covered stents to the infrarenal abdominal aorta as well as bilateral iliac bifurcations and severe single vessel CAD status post stents x 3 to the RCA. There is residual 60-70% mid Circumflex stenosis and 50-60% mid LAD stenosis, both lesions non obstructive by iFR. Currently he reports feeling \"better\". He denies any chest pain or limiting exertional dyspnea day-to-day activities. He is looking to start exercising with his wife in the near future. He reports rare palpitations but no near-syncope or syncope. He has been compliant with medications including aspirin, Plavix, and Xarelto without any interruption. 7/10/23: Patient with complaints of right leg claudication and discoloration. He has known severe PAD status post  with 5 total covered stents to the infrarenal abdominal aorta as well as bilateral iliac bifurcations. He now reports right leg claudication with ambulation over the last month which is similar to what he had before the stents. The pain in in his posterior calf and anterior thigh. He reports leg edema and mild darkening along the shins and forefoot. He reports compliance with medications including Plavix and Xarelto. He continues to smoke \"occasionally\". He is also status post severe single vessel CAD status post stents x 3 to the RCA. There is residual 60-70% mid Circumflex stenosis and 50-60% mid LAD stenosis, both lesions non obstructive by iFR. He denies any chest pain or limiting exertional dyspnea. He has rare palpitations but no near syncope or syncope. 10/11/23: Patient here for follow up of CAD and PAD. He is status post stents x 3 to the RCA.  There is residual 60-70% mid Circumflex

## 2023-11-13 ENCOUNTER — OFFICE VISIT (OUTPATIENT)
Dept: CARDIOLOGY CLINIC | Age: 53
End: 2023-11-13
Payer: COMMERCIAL

## 2023-11-13 DIAGNOSIS — R06.09 DOE (DYSPNEA ON EXERTION): Primary | ICD-10-CM

## 2023-11-13 PROCEDURE — 93000 ELECTROCARDIOGRAM COMPLETE: CPT | Performed by: INTERNAL MEDICINE

## 2023-11-13 PROCEDURE — 99214 OFFICE O/P EST MOD 30 MIN: CPT | Performed by: INTERNAL MEDICINE

## 2023-11-13 RX ORDER — RIVAROXABAN 2.5 MG/1
2.5 TABLET, FILM COATED ORAL 2 TIMES DAILY
COMMUNITY
Start: 2023-10-31

## 2023-11-13 RX ORDER — OXYCODONE HYDROCHLORIDE 5 MG/1
TABLET ORAL
COMMUNITY
Start: 2023-10-31 | End: 2023-11-14

## 2023-11-13 RX ORDER — DOCUSATE SODIUM -SENNOSIDES 50; 8.6 MG/1; MG/1
TABLET, COATED ORAL
COMMUNITY
Start: 2023-10-31

## 2023-11-13 NOTE — PROGRESS NOTES
but no active infection. ECG (11/13/2023): Sinus rhythm HR 66 bpm.  [place] deviation. LAFB. No significant ST/T changes. Echocardiogram (10/20/2022): EF 60%. Mild LVH. Grade 1 diastolic dysfunction. Mildly dilated left atrium. Cardiac catheterization (10/20/2022): Moderate to severe 3 vessel CAD   - 85-90% stenosis of mid RCA and 70% distal RCA  - 60-70% stenosis of mid Circumflex   - 50-60% stenosis of mid LAD  Normal LV filling pressures  PCI/DIOGENES x 3 to the RCA; negative iFR LAD and Lcx. Lexiscan Myoview stress test (9/12/23): Small size, mild intensity defect in the mid to distal inferior wall, most consistent with artifact but cannot rule out small area of ischemia. EF 52%. IMPRESSION:  Severe PAD with history of failed PCI/stents to the distal abdominal aorta and bilateral iliac arteries, now status post aorto-bifemoral bypass surgery. Stable CAD, status post PCI/drug eluding stents x 3 with residual 60-70% mid Circumflex stenosis and 50-60% mid LAD stenosis, both lesions non obstructive by iFR. Hypertension  DM  OLVIN on CPAP  Hx recurrent venous thromboembolism, on Xarelto  Tobacco abuse  Obesity       RECOMMENDATIONS:  Mr. Stefanie Bea appears to be stable from a cardiac standpoint. No further cardiac testing needed at this time. The best course of action is continued medical therapy and aggressive risk factor modification. Advised to continue current cardiac medications including Plavix and low-dose Xarelto. He is advised to follow-up with his vascular surgeon to check on his healing incisions. The patient is agreeable to the plan. Follow up cardiac evaluation in 6 months, sooner if needed. Thank you very much for allowing me to participate in Mr. Grant Falcon cardiac care. Should you have any questions, please do not hesitate to contact me.      Sincerely,    Moy Funes DO, [facility], [address] and [address]

## 2023-11-14 ENCOUNTER — OFFICE VISIT (OUTPATIENT)
Dept: FAMILY MEDICINE CLINIC | Age: 53
End: 2023-11-14

## 2023-11-14 VITALS
SYSTOLIC BLOOD PRESSURE: 110 MMHG | OXYGEN SATURATION: 97 % | WEIGHT: 267 LBS | HEART RATE: 77 BPM | DIASTOLIC BLOOD PRESSURE: 80 MMHG | TEMPERATURE: 98.7 F | BODY MASS INDEX: 36.16 KG/M2 | HEIGHT: 72 IN

## 2023-11-14 DIAGNOSIS — E11.65 TYPE 2 DIABETES MELLITUS WITH HYPERGLYCEMIA, WITHOUT LONG-TERM CURRENT USE OF INSULIN (HCC): ICD-10-CM

## 2023-11-14 DIAGNOSIS — I73.9 PAD (PERIPHERAL ARTERY DISEASE) (HCC): ICD-10-CM

## 2023-11-14 DIAGNOSIS — E11.9 TYPE 2 DIABETES MELLITUS WITHOUT COMPLICATION, WITHOUT LONG-TERM CURRENT USE OF INSULIN (HCC): Primary | ICD-10-CM

## 2023-11-14 DIAGNOSIS — J44.9 COPD, SEVERE (HCC): ICD-10-CM

## 2023-11-14 DIAGNOSIS — I25.118 CORONARY ARTERY DISEASE INVOLVING NATIVE CORONARY ARTERY OF NATIVE HEART WITH OTHER FORM OF ANGINA PECTORIS (HCC): ICD-10-CM

## 2023-11-14 DIAGNOSIS — Z87.891 PERSONAL HISTORY OF TOBACCO USE: ICD-10-CM

## 2023-11-14 DIAGNOSIS — Z23 NEEDS FLU SHOT: ICD-10-CM

## 2023-11-14 DIAGNOSIS — D68.59 HYPERCOAGULABLE STATE (HCC): ICD-10-CM

## 2023-11-14 RX ORDER — OXYCODONE HYDROCHLORIDE 5 MG/1
TABLET ORAL
Status: CANCELLED | OUTPATIENT
Start: 2023-11-14

## 2023-11-14 RX ORDER — GLUCOSAMINE HCL/CHONDROITIN SU 500-400 MG
1 CAPSULE ORAL DAILY
Qty: 100 STRIP | Refills: 3 | Status: SHIPPED | OUTPATIENT
Start: 2023-11-14

## 2023-11-14 RX ORDER — BLOOD-GLUCOSE METER
1 KIT MISCELLANEOUS DAILY PRN
Qty: 1 KIT | Refills: 0 | Status: SHIPPED | OUTPATIENT
Start: 2023-11-14

## 2023-11-14 RX ORDER — ALBUTEROL SULFATE 90 UG/1
2 AEROSOL, METERED RESPIRATORY (INHALATION) EVERY 6 HOURS PRN
Qty: 18 G | Refills: 5 | Status: SHIPPED | OUTPATIENT
Start: 2023-11-14

## 2023-11-14 NOTE — PROGRESS NOTES
After obtaining consent, and per orders of Dr. Joe Gunderson, injection of dlu given in Right deltoid by Shandra Morris CMA ([facility]). Patient instructed to remain in clinic for 20 minutes afterwards, and to report any adverse reaction to me immediately. Vaccine Information Sheet, \"Influenza - Inactivated\"  given to [address] [place] [address], or parent/legal guardian of  [address] [place] [address] and verbalized understanding. Patient responses:    Have you ever had a reaction to a flu vaccine? No  Are you able to eat eggs without adverse effects? Yes  Do you have any current illness? No  Have you ever had Guillian Cade Syndrome? No    Flu vaccine given per order. Please see immunization tab.

## 2023-11-14 NOTE — PROGRESS NOTES
Chief Complaint   Patient presents with    Follow-Up from Hospital     [facility], bypass surgery        HPI:  Delfino Perkins II is a 46 y.o. male       F/u after vascular surgery    Had f/u with [facility] already   Operations during Hospitalization:   10/26/2023  Procedure(s):  Bilateral femoral cutdown, right localized femoral endarterectomy, explantation of aortic stent, aorto-bifemoral bypass (16x8 D[place]ron)  Findings: occluded iliacs and infrarenal aorta    Pt d/c home 10/31. Has quit smoking    Tolerating metformin    Sugars better    Needs new metter      Lab Results   Component Value Date    LABA1C 9.0 (H) 06/13/2023     No results found for: \"EAG\"          Wt Readings from Last 3 Encounters:   11/14/23 121.1 kg (267 lb)   10/11/23 124.3 kg (274 lb)   08/30/23 123.4 kg (272 lb)         Patient Active Problem List   Diagnosis    Vasovagal syncope    Diabetes mellitus ([address])    Sleep apnea    Acute deep vein thrombosis (DVT) of right femoral vein (HCC)    Acute deep vein thrombosis (DVT) of right popliteal vein (HCC)    Superficial thrombophlebitis of right leg    Post-thrombotic syndrome of right lower extremity    Aching leg syndrome of left lower extremity    Iliac vein stenosis, right    PAD (peripheral artery disease) ([address])    Coronary artery disease involving native coronary artery of native heart with other form of angina pectoris ([address])    Hypercoagulable state ([address])    Severe obesity (BMI 35.0-39. 9) with comorbidity ([address])    Claudication ([address])    Arterial occlusion, lower extremity (HCC)    Centrilobular emphysema (HCC)    COPD, severe (HCC)    Obesity, Class II, BMI 35-39.9    Pain, postoperative, acute    Type 2 diabetes mellitus with hyperglycemia       Current Outpatient Medications   Medication Sig Dispense Refill    albuterol sulfate HFA (PROVENTIL;VENTOLIN;PROAIR) 108 (90 Base) MCG/ACT inhaler Inhale 2 puffs into the lungs every 6 hours as needed for Wheezing 18 g 5    Blood Glucose Monitoring Suppl (ONE

## 2023-11-30 DIAGNOSIS — E11.9 TYPE 2 DIABETES MELLITUS WITHOUT COMPLICATION, WITHOUT LONG-TERM CURRENT USE OF INSULIN (HCC): ICD-10-CM

## 2023-11-30 LAB — HBA1C MFR BLD: 6.5 % (ref 4.8–5.9)

## 2023-12-04 ENCOUNTER — OFFICE VISIT (OUTPATIENT)
Dept: FAMILY MEDICINE CLINIC | Age: 53
End: 2023-12-04
Payer: COMMERCIAL

## 2023-12-04 VITALS
WEIGHT: 266.6 LBS | DIASTOLIC BLOOD PRESSURE: 70 MMHG | BODY MASS INDEX: 36.11 KG/M2 | HEIGHT: 72 IN | HEART RATE: 68 BPM | OXYGEN SATURATION: 97 % | SYSTOLIC BLOOD PRESSURE: 120 MMHG | TEMPERATURE: 98 F

## 2023-12-04 DIAGNOSIS — I73.9 PAD (PERIPHERAL ARTERY DISEASE) (HCC): ICD-10-CM

## 2023-12-04 DIAGNOSIS — D68.59 HYPERCOAGULABLE STATE (HCC): ICD-10-CM

## 2023-12-04 DIAGNOSIS — E11.9 TYPE 2 DIABETES MELLITUS WITHOUT COMPLICATION, WITHOUT LONG-TERM CURRENT USE OF INSULIN (HCC): Primary | ICD-10-CM

## 2023-12-04 DIAGNOSIS — J44.9 COPD, SEVERE (HCC): ICD-10-CM

## 2023-12-04 PROCEDURE — 99213 OFFICE O/P EST LOW 20 MIN: CPT | Performed by: FAMILY MEDICINE

## 2023-12-04 PROCEDURE — 3044F HG A1C LEVEL LT 7.0%: CPT | Performed by: FAMILY MEDICINE

## 2023-12-04 NOTE — PROGRESS NOTES
Chief Complaint   Patient presents with    Diabetes     6 month        HPI:  Elbert Ring is a 46 y.o. male         Tolerating metformin    Sugars better    Just did A1c       Lab Results   Component Value Date    LABA1C 6.5 (H) 11/30/2023     No results found for: \"EAG\"          Wt Readings from Last 3 Encounters:   12/04/23 120.9 kg (266 lb 9.6 oz)   11/14/23 121.1 kg (267 lb)   10/11/23 124.3 kg (274 lb)         Patient Active Problem List   Diagnosis    Vasovagal syncope    Diabetes mellitus ([address])    Sleep apnea    Acute deep vein thrombosis (DVT) of right femoral vein (HCC)    Acute deep vein thrombosis (DVT) of right popliteal vein (HCC)    Superficial thrombophlebitis of right leg    Post-thrombotic syndrome of right lower extremity    Aching leg syndrome of left lower extremity    Iliac vein stenosis, right    PAD (peripheral artery disease) ([address])    Coronary artery disease involving native coronary artery of native heart with other form of angina pectoris ([address])    Hypercoagulable state ([address])    Severe obesity (BMI 35.0-39. 9) with comorbidity ([address])    Claudication ([address])    Arterial occlusion, lower extremity (HCC)    Centrilobular emphysema (HCC)    COPD, severe (HCC)    Obesity, Class II, BMI 35-39.9    Pain, postoperative, acute    Type 2 diabetes mellitus with hyperglycemia       Current Outpatient Medications   Medication Sig Dispense Refill    albuterol sulfate HFA (PROVENTIL;VENTOLIN;PROAIR) 108 (90 Base) MCG/ACT inhaler Inhale 2 puffs into the lungs every 6 hours as needed for Wheezing 18 g 5    Blood Glucose Monitoring Suppl (ONE TOUCH ULTRA MINI) w/Device KIT 1 kit by Does not apply route daily as needed (test daily) 1 kit 0    ONE TOUCH LANCETS MISC 1 each by Does not apply route daily 100 each 3    blood glucose monitor strips 1 strip by Other route daily Test 1 times a day & as needed for symptoms of irregular blood glucose.  100 strip 3    XARELTO 2.5 MG TABS tablet Take 1 tablet by mouth

## 2023-12-28 ENCOUNTER — OFFICE VISIT (OUTPATIENT)
Dept: PULMONOLOGY | Age: 53
End: 2023-12-28

## 2023-12-28 VITALS
BODY MASS INDEX: 36.08 KG/M2 | HEART RATE: 85 BPM | OXYGEN SATURATION: 95 % | WEIGHT: 266 LBS | DIASTOLIC BLOOD PRESSURE: 78 MMHG | SYSTOLIC BLOOD PRESSURE: 134 MMHG

## 2023-12-28 DIAGNOSIS — J44.9 CHRONIC OBSTRUCTIVE PULMONARY DISEASE, UNSPECIFIED COPD TYPE (HCC): Primary | ICD-10-CM

## 2023-12-28 DIAGNOSIS — Z87.891 PERSONAL HISTORY OF TOBACCO USE: ICD-10-CM

## 2023-12-28 DIAGNOSIS — G47.33 OSA (OBSTRUCTIVE SLEEP APNEA): ICD-10-CM

## 2023-12-28 DIAGNOSIS — E66.9 OBESITY (BMI 30-39.9): ICD-10-CM

## 2023-12-28 NOTE — PROGRESS NOTES
diarrhea, nausea and vomiting.   Genitourinary:  Negative for difficulty urinating and hematuria.   Musculoskeletal:  Negative for arthralgias, joint swelling and myalgias.   Skin:  Negative for rash.   Allergic/Immunologic: Negative for environmental allergies.   Neurological:  Negative for dizziness, tremors, weakness and headaches.   Psychiatric/Behavioral:  Negative for behavioral problems and sleep disturbance.          :     Vitals:    12/28/23 1458   BP: 134/78   Site: Left Upper Arm   Position: Sitting   Cuff Size: Medium Adult   Pulse: 85   SpO2: 95%   Weight: 120.7 kg (266 lb)     Wt Readings from Last 3 Encounters:   12/28/23 120.7 kg (266 lb)   12/04/23 120.9 kg (266 lb 9.6 oz)   11/14/23 121.1 kg (267 lb)         Physical Exam  Constitutional:       Appearance: He is well-developed. He is obese.   HENT:      Head: Normocephalic and atraumatic.      Nose: Nose normal.   Eyes:      Conjunctiva/sclera: Conjunctivae normal.      Pupils: Pupils are equal, round, and reactive to light.   Neck:      Thyroid: No thyromegaly.      Vascular: No JVD.      Trachea: No tracheal deviation.   Cardiovascular:      Rate and Rhythm: Normal rate and regular rhythm.      Heart sounds: No murmur heard.     No friction rub. No gallop.   Pulmonary:      Effort: Pulmonary effort is normal. No respiratory distress.      Breath sounds: Normal breath sounds. No wheezing or rales.   Chest:      Chest wall: No tenderness.   Abdominal:      General: There is no distension.   Musculoskeletal:         General: Normal range of motion.   Lymphadenopathy:      Cervical: No cervical adenopathy.   Skin:     General: Skin is warm and dry.      Findings: No rash.   Neurological:      Mental Status: He is alert and oriented to person, place, and time.      Cranial Nerves: No cranial nerve deficit.   Psychiatric:         Behavior: Behavior normal.         Current Outpatient Medications   Medication Sig Dispense Refill    albuterol sulfate HFA

## 2023-12-29 RX ORDER — FLUTICASONE FUROATE, UMECLIDINIUM BROMIDE AND VILANTEROL TRIFENATATE 100; 62.5; 25 UG/1; UG/1; UG/1
1 POWDER RESPIRATORY (INHALATION) DAILY
Qty: 2 EACH | Refills: 0 | Status: SHIPPED | COMMUNITY
Start: 2023-12-29

## 2024-01-04 ASSESSMENT — ENCOUNTER SYMPTOMS
CHEST TIGHTNESS: 0
ABDOMINAL PAIN: 0
DIARRHEA: 0
VOMITING: 0
WHEEZING: 0
EYE ITCHING: 0
NAUSEA: 0
SORE THROAT: 0
RHINORRHEA: 0
COUGH: 0
VOICE CHANGE: 0

## 2024-01-08 ENCOUNTER — HOSPITAL ENCOUNTER (OUTPATIENT)
Dept: CT IMAGING | Age: 54
Discharge: HOME | End: 2024-01-10
Payer: COMMERCIAL

## 2024-01-08 DIAGNOSIS — Z87.891 PERSONAL HISTORY OF TOBACCO USE: ICD-10-CM

## 2024-01-08 PROCEDURE — 71271 CT THORAX LUNG CANCER SCR C-: CPT

## 2024-01-09 DIAGNOSIS — E11.9 TYPE 2 DIABETES MELLITUS WITHOUT COMPLICATION, WITHOUT LONG-TERM CURRENT USE OF INSULIN (HCC): ICD-10-CM

## 2024-01-09 RX ORDER — METFORMIN HYDROCHLORIDE 500 MG/1
1000 TABLET, EXTENDED RELEASE ORAL
Qty: 180 TABLET | Refills: 1 | Status: SHIPPED | OUTPATIENT
Start: 2024-01-09

## 2024-01-09 NOTE — TELEPHONE ENCOUNTER
Comments:     Last Office Visit (last PCP visit):   12/4/2023    Next Visit Date:  Future Appointments   Date Time Provider Department Center   1/18/2024  8:45 AM Corey Astorga MD [facility]   4/15/2024  4:00 PM Magdalena Lara DO [facility]   6/4/2024  8:15 AM Cheikh Panda MD [facility]       **If hasn't been seen in over a year OR hasn't followed up according to last diabetes/ADHD visit, make appointment for patient before sending refill to provider.    Rx requested:  Requested Prescriptions      No prescriptions requested or ordered in this encounter

## 2024-01-14 RX ORDER — ASPIRIN 81 MG/1
TABLET, COATED ORAL
Qty: 90 TABLET | Refills: 3 | OUTPATIENT
Start: 2024-01-14

## 2024-01-15 RX ORDER — CLOPIDOGREL BISULFATE 75 MG/1
75 TABLET ORAL DAILY
Qty: 90 TABLET | Refills: 3 | Status: SHIPPED | OUTPATIENT
Start: 2024-01-15

## 2024-01-15 NOTE — TELEPHONE ENCOUNTER
Comments: pt called for refill.    Last Office Visit (last PCP visit):   11/13/2023    Next Visit Date:  Future Appointments   Date Time Provider Department Center   1/18/2024  8:45 AM Corey Astorga MD [facility]   4/15/2024  4:00 PM Magdalena Lara DO [facility]   6/4/2024  8:15 AM Cheikh Panda MD [facility]       **If hasn't been seen in over a year OR hasn't followed up according to last diabetes/ADHD visit, make appointment for patient before sending refill to provider.    Rx requested:  Requested Prescriptions     Pending Prescriptions Disp Refills    clopidogrel (PLAVIX) 75 MG tablet 90 tablet 3     Sig: Take 1 tablet by mouth daily

## 2024-01-18 ENCOUNTER — OFFICE VISIT (OUTPATIENT)
Dept: PULMONOLOGY | Age: 54
End: 2024-01-18
Payer: COMMERCIAL

## 2024-01-18 VITALS
OXYGEN SATURATION: 96 % | DIASTOLIC BLOOD PRESSURE: 80 MMHG | WEIGHT: 268 LBS | SYSTOLIC BLOOD PRESSURE: 122 MMHG | HEART RATE: 70 BPM | BODY MASS INDEX: 36.35 KG/M2

## 2024-01-18 DIAGNOSIS — J44.9 CHRONIC OBSTRUCTIVE PULMONARY DISEASE, UNSPECIFIED COPD TYPE (HCC): Primary | ICD-10-CM

## 2024-01-18 DIAGNOSIS — E66.9 OBESITY (BMI 30-39.9): ICD-10-CM

## 2024-01-18 DIAGNOSIS — G47.33 OSA (OBSTRUCTIVE SLEEP APNEA): ICD-10-CM

## 2024-01-18 DIAGNOSIS — Z87.891 PERSONAL HISTORY OF TOBACCO USE: ICD-10-CM

## 2024-01-18 PROCEDURE — 99214 OFFICE O/P EST MOD 30 MIN: CPT | Performed by: INTERNAL MEDICINE

## 2024-01-18 NOTE — PROGRESS NOTES
Subjective:             Maverick Travis II is a 53 y.o. male who complains today of:     Chief Complaint   Patient presents with    Follow-up     3wk f/u for CT results       HPI  He had CT chest done came for f/u   He is diagnose with COPD  at [facility] in sep 2023, he had PFT  done at [facility]   He has h/o  smoking 2 ppd,  in last 10 yrs , he is smoking cigar.      PFTs revealed severe obstruction, air trapping and reduced DLCO.     CXR  Lines, tubes, and devices:  Endotracheal tube has been removed.  The right IJ [place] catheter has been removed.  Other tubes and lines are stable.Lungs and pleura:  Interval improvement in atelectasis at lung bases.  No pulmonary edema or pneumothorax.  No substantial pleural effusions.Cardiomediastinal silhouette: Stable cardiomediastinal silhouette.      He is on albuterol HFA  , anoro ellipta 1 puff daily.   C/o shortness of breath , worse with exertion.   No Wheezing. No Cough .No Hemoptysis.No Chest tightness.   No Chest pain with radiation  or pleuritic pain.No  leg edema.   No orthopnea.No Fever or chills. No Rhinorrhea and postnasal drip.     He has PVD on blood thinner xarelto , plavix.     He has h/o sleep apnea  but on  CPAP therapy since last 10 yrs and DME is [facility]  .     Allergies:  Patient has no known allergies.  Past Medical History:   Diagnosis Date    Diabetes mellitus (HCC)     Kidney stone     Pneumonia     Sleep apnea     Syncope and collapse 5/1/2018    Vasovagal syncope 5/1/2018     Past Surgical History:   Procedure Laterality Date    CARDIAC PROCEDURE N/A 8/30/2023    Peripheral angiography possible intervention via the left common femoral artery. performed by Dave Acuña DO at [facility]    CATARACT REMOVAL WITH IMPLANT Bilateral 2015    IR THROMB MECH VEIN  9/10/2021    IR THROMB MECH VEIN 9/10/2021 [facility] PROC[facility]URE     Family History   Problem Relation Age of Onset    Other Mother         COPD    Diabetes Father      Social History

## 2024-02-20 DIAGNOSIS — E11.65 TYPE 2 DIABETES MELLITUS WITH HYPERGLYCEMIA, WITHOUT LONG-TERM CURRENT USE OF INSULIN (HCC): ICD-10-CM

## 2024-02-20 RX ORDER — BLOOD-GLUCOSE METER
EACH MISCELLANEOUS
Qty: 1 KIT | Refills: 0 | Status: SHIPPED | OUTPATIENT
Start: 2024-02-20

## 2024-02-20 NOTE — TELEPHONE ENCOUNTER
Comments:     Last Office Visit (last PCP visit):   2023    Next Visit Date:  Future Appointments   Date Time Provider Department Center   4/15/2024  4:00 PM Magdalena Lara DO [facility]   2024  8:30 AM Corey Astorga MD [facility]   2024  8:15 AM Cheikh Panda MD [facility]       **If hasn't been seen in over a year OR hasn't followed up according to last diabetes/ADHD visit, make appointment for patient before sending refill to provider.    Rx requested:  Requested Prescriptions     Pending Prescriptions Disp Refills    Blood Glucose Monitoring Suppl (ONE TOUCH ULTRA 2) w/Device KIT [Pharmacy Med Name: ONE TOUCH ULTRA2 GLUCOSE SYST]       Si KIT BY DOES NOT APPLY ROUTE DAILY AS NEEDED (TEST DAILY)

## 2024-03-11 RX ORDER — RIVAROXABAN 2.5 MG/1
2.5 TABLET, FILM COATED ORAL 2 TIMES DAILY
Qty: 60 TABLET | Refills: 5 | Status: SHIPPED | OUTPATIENT
Start: 2024-03-11

## 2024-03-11 NOTE — TELEPHONE ENCOUNTER
Requesting medication refill. Please approve or deny this request.    Rx requested:  Requested Prescriptions     Pending Prescriptions Disp Refills    XARELTO 2.5 MG TABS tablet 60 tablet 5     Sig: Take 1 tablet by mouth 2 times daily         Last Office Visit:   11/13/2023      Next Visit Date:  Future Appointments   Date Time Provider Department Center   4/15/2024  4:00 PM Magdalena Lara DO [facility]   5/21/2024  8:30 AM Corey Astorga [facility]   6/4/2024  8:15 AM Cheikh Panda MD [facility]                Please approve or deny.

## 2024-05-02 ENCOUNTER — OFFICE VISIT (OUTPATIENT)
Dept: CARDIOLOGY CLINIC | Age: 54
End: 2024-05-02
Payer: COMMERCIAL

## 2024-05-02 VITALS
SYSTOLIC BLOOD PRESSURE: 138 MMHG | WEIGHT: 270 LBS | DIASTOLIC BLOOD PRESSURE: 78 MMHG | HEART RATE: 68 BPM | BODY MASS INDEX: 36.62 KG/M2 | OXYGEN SATURATION: 98 %

## 2024-05-02 DIAGNOSIS — R06.09 DOE (DYSPNEA ON EXERTION): Primary | ICD-10-CM

## 2024-05-02 DIAGNOSIS — E78.5 HYPERLIPIDEMIA, UNSPECIFIED HYPERLIPIDEMIA TYPE: ICD-10-CM

## 2024-05-02 PROCEDURE — 93000 ELECTROCARDIOGRAM COMPLETE: CPT | Performed by: INTERNAL MEDICINE

## 2024-05-02 PROCEDURE — 99214 OFFICE O/P EST MOD 30 MIN: CPT | Performed by: INTERNAL MEDICINE

## 2024-05-02 NOTE — PATIENT INSTRUCTIONS
Stress test in 6 months before next office visit  Fasting cholesterol blood work   Continue current cardiac medications.  Follow up in 6 months, sooner if needed.

## 2024-05-02 NOTE — PROGRESS NOTES
+ chronic leg edema right greater than left leg.  No claudication.  + PAD follows with vascular surgeon  Neurological: No TIA or CVA symptoms. + paresthesias.   Musculoskeletal: No chest wall pain.  Psychiatric: No anxiety.   *All other systems were reviewed and found to be negative unless otherwise noted in the HPI*    Physical Examination: His  weight is 122.5 kg (270 lb). His blood pressure is 138/78 and his pulse is 68. His oxygen saturation is 98%.  He is alert and oriented to person, place, and time. No distress. Head is atraumatic. Moist mucous membranes. Neck is supple without JVD or carotid bruits. No thyroidmegaly. Lungs are clear to auscultation both anteriorly and posteriorly. No accessory muscle usage. Heart is a regular rate and rhythm. No murmurs. No S3 or S4. PMI is nondisplaced. Abdomen is soft and non tender.  No hepatosplenomegaly. No abdominal aortic bruits or masses. Lower extremities with 1+ pitting edema right greater than left leg and chronic venous changes. Diminished lower extremity pulses bilaterally.  No clubbing or cyanosis. Neurologically, cranial nerves are grossly intact. No focal sensory and/or motor deficits. Skin is intact without rash or lesions.       ECG (5/2/2024): Sinus rhythm with first-degree AV block HR 67 bpm.  Left axis deviation.  LAFB. No significant ST/T changes.     Echocardiogram (10/20/2022): EF 60%.  Mild LVH.  Grade 1 diastolic dysfunction.  Mildly dilated left atrium.    Cardiac catheterization (10/20/2022):  Moderate to severe 3 vessel CAD   - 85-90% stenosis of mid RCA and 70% distal RCA  - 60-70% stenosis of mid Circumflex   - 50-60% stenosis of mid LAD  Normal LV filling pressures  PCI/DIOGENES x 3 to the RCA; negative iFR LAD and Lcx.     Lexiscan Myoview stress test (9/12/23): Small size, mild intensity defect in the mid to distal inferior wall, most consistent with artifact but cannot rule out small area of ischemia. EF 52%.      IMPRESSION:  Functional class

## 2024-05-21 ENCOUNTER — OFFICE VISIT (OUTPATIENT)
Dept: PULMONOLOGY | Age: 54
End: 2024-05-21
Payer: COMMERCIAL

## 2024-05-21 VITALS
SYSTOLIC BLOOD PRESSURE: 130 MMHG | WEIGHT: 270 LBS | DIASTOLIC BLOOD PRESSURE: 70 MMHG | TEMPERATURE: 98 F | BODY MASS INDEX: 36.62 KG/M2 | OXYGEN SATURATION: 94 % | HEART RATE: 61 BPM

## 2024-05-21 DIAGNOSIS — E66.01 SEVERE OBESITY (BMI 35.0-39.9) WITH COMORBIDITY (HCC): ICD-10-CM

## 2024-05-21 DIAGNOSIS — Z87.891 PERSONAL HISTORY OF TOBACCO USE: ICD-10-CM

## 2024-05-21 DIAGNOSIS — G47.33 OSA ON CPAP: ICD-10-CM

## 2024-05-21 DIAGNOSIS — J44.9 COPD, SEVERE (HCC): Primary | ICD-10-CM

## 2024-05-21 PROCEDURE — 99215 OFFICE O/P EST HI 40 MIN: CPT | Performed by: INTERNAL MEDICINE

## 2024-05-21 RX ORDER — FLUTICASONE FUROATE, UMECLIDINIUM BROMIDE AND VILANTEROL TRIFENATATE 100; 62.5; 25 UG/1; UG/1; UG/1
1 POWDER RESPIRATORY (INHALATION) DAILY
Qty: 1 EACH | Refills: 2 | Status: SHIPPED | OUTPATIENT
Start: 2024-05-21

## 2024-05-21 RX ORDER — ALBUTEROL SULFATE 90 UG/1
2 AEROSOL, METERED RESPIRATORY (INHALATION) EVERY 6 HOURS PRN
Qty: 18 G | Refills: 5 | Status: SHIPPED | OUTPATIENT
Start: 2024-05-21

## 2024-05-21 ASSESSMENT — ENCOUNTER SYMPTOMS
SHORTNESS OF BREATH: 1
WHEEZING: 0
CHEST TIGHTNESS: 0
DIARRHEA: 0
COUGH: 0
VOMITING: 0
NAUSEA: 0
VOICE CHANGE: 0
SORE THROAT: 0
ABDOMINAL PAIN: 0
RHINORRHEA: 0
EYE ITCHING: 0

## 2024-05-21 NOTE — PROGRESS NOTES
Subjective:             Maverick Travis II is a 53 y.o. male who complains today of:     Chief Complaint   Patient presents with    Follow-up     4m f/u on COPD       HPI  He is on albuterol HFA  , trelegy ellipta 1 puff daily.   C/o shortness of breath , worse with exertion.   No Wheezing. C/o Cough not able to bring anything out .  No Hemoptysis.No Chest tightness.   No Chest pain with radiation  or pleuritic pain.No  leg edema.   No orthopnea.No Fever or chills. No Rhinorrhea and postnasal drip.     He has h/o  smoking 2 ppd,  in last 10 yrs , he was smoking cigar. Quit smoking cigar in oct 2023   PFTs revealed severe obstruction, air trapping and reduced DLCO.     He has PVD on blood thinner xarelto , plavix.      He has h/o sleep apnea  on  CPAP therapy since last 6yrs and DME is [facility]    Need new CPAP and supply .  He is using CPAP with  9  centimeters of H2O with heated humidity.  He is using CPAP for about   8-9   hours every night.  He is using CPAP with  nasal  Mask.  He said  sleep is restful with the CPAP use.        Allergies:  Patient has no known allergies.  Past Medical History:   Diagnosis Date    Diabetes mellitus (HCC)     Kidney stone     Pneumonia     Sleep apnea     Syncope and collapse 5/1/2018    Vasovagal syncope 5/1/2018     Past Surgical History:   Procedure Laterality Date    CARDIAC PROCEDURE N/A 8/30/2023    Peripheral angiography possible intervention via the left common femoral artery. performed [place] Dave Acuña [place] [facility] [place] [facility]    CATARACT REMOVAL WITH IMPLANT Bilateral 2015    IR THROMB MECH VEIN  9/10/2021    IR THROMB MECH VEIN 9[place]10/2021 [facility]     Family History   Problem Relation Age of Onset    Other Mother         COPD    Diabetes Father      Social History     Socioeconomic History    Marital status:      Spouse name: Not on file    Number of children: Not on file    Years of education: Not on file    Highest education level: Not on file

## 2024-06-04 ENCOUNTER — OFFICE VISIT (OUTPATIENT)
Dept: FAMILY MEDICINE CLINIC | Age: 54
End: 2024-06-04
Payer: COMMERCIAL

## 2024-06-04 VITALS
HEART RATE: 65 BPM | TEMPERATURE: 97.1 F | WEIGHT: 270.4 LBS | OXYGEN SATURATION: 98 % | BODY MASS INDEX: 36.62 KG/M2 | SYSTOLIC BLOOD PRESSURE: 126 MMHG | DIASTOLIC BLOOD PRESSURE: 78 MMHG | HEIGHT: 72 IN

## 2024-06-04 DIAGNOSIS — D68.59 HYPERCOAGULABLE STATE (HCC): ICD-10-CM

## 2024-06-04 DIAGNOSIS — I73.9 PAD (PERIPHERAL ARTERY DISEASE) (HCC): ICD-10-CM

## 2024-06-04 DIAGNOSIS — Z86.718 HISTORY OF DEEP VEIN THROMBOSIS (DVT) OF LOWER EXTREMITY: ICD-10-CM

## 2024-06-04 DIAGNOSIS — J44.9 COPD, SEVERE (HCC): ICD-10-CM

## 2024-06-04 DIAGNOSIS — E11.65 TYPE 2 DIABETES MELLITUS WITH HYPERGLYCEMIA, WITHOUT LONG-TERM CURRENT USE OF INSULIN (HCC): ICD-10-CM

## 2024-06-04 DIAGNOSIS — E66.01 SEVERE OBESITY (BMI 35.0-39.9) WITH COMORBIDITY (HCC): ICD-10-CM

## 2024-06-04 DIAGNOSIS — E78.5 HYPERLIPIDEMIA, UNSPECIFIED HYPERLIPIDEMIA TYPE: ICD-10-CM

## 2024-06-04 DIAGNOSIS — I25.118 CORONARY ARTERY DISEASE INVOLVING NATIVE CORONARY ARTERY OF NATIVE HEART WITH OTHER FORM OF ANGINA PECTORIS (HCC): ICD-10-CM

## 2024-06-04 DIAGNOSIS — E11.65 TYPE 2 DIABETES MELLITUS WITH HYPERGLYCEMIA, WITHOUT LONG-TERM CURRENT USE OF INSULIN (HCC): Primary | ICD-10-CM

## 2024-06-04 LAB
ALBUMIN SERPL-MCNC: 4.2 G/DL (ref 3.5–4.6)
ALP SERPL-CCNC: 84 U/L (ref 35–104)
ALT SERPL-CCNC: 20 U/L (ref 0–41)
ANION GAP SERPL CALCULATED.3IONS-SCNC: 12 MEQ/L (ref 9–15)
AST SERPL-CCNC: 14 U/L (ref 0–40)
BILIRUB SERPL-MCNC: 0.4 MG/DL (ref 0.2–0.7)
BUN SERPL-MCNC: 15 MG/DL (ref 6–20)
CALCIUM SERPL-MCNC: 9.5 MG/DL (ref 8.5–9.9)
CHLORIDE SERPL-SCNC: 103 MEQ/L (ref 95–107)
CHOLEST SERPL-MCNC: 116 MG/DL (ref 0–199)
CO2 SERPL-SCNC: 27 MEQ/L (ref 20–31)
CREAT SERPL-MCNC: 0.91 MG/DL (ref 0.7–1.2)
CREAT UR-MCNC: 131.7 MG/DL
ERYTHROCYTE [DISTWIDTH] IN BLOOD BY AUTOMATED COUNT: 12.7 % (ref 11.5–14.5)
GLOBULIN SER CALC-MCNC: 2.7 G/DL (ref 2.3–3.5)
GLUCOSE SERPL-MCNC: 141 MG/DL (ref 70–99)
HCT VFR BLD AUTO: 48.2 % (ref 42–52)
HDLC SERPL-MCNC: 33 MG/DL (ref 40–59)
HGB BLD-MCNC: 16.5 G/DL (ref 14–18)
LDLC SERPL CALC-MCNC: 57 MG/DL (ref 0–129)
MCH RBC QN AUTO: 33.2 PG (ref 27–31.3)
MCHC RBC AUTO-ENTMCNC: 34.2 % (ref 33–37)
MCV RBC AUTO: 97 FL (ref 79–92.2)
MICROALBUMIN UR-MCNC: <1.2 MG/DL
MICROALBUMIN/CREAT UR-RTO: NORMAL MG/G (ref 0–30)
PLATELET # BLD AUTO: 176 K/UL (ref 130–400)
POTASSIUM SERPL-SCNC: 4.8 MEQ/L (ref 3.4–4.9)
PROT SERPL-MCNC: 6.9 G/DL (ref 6.3–8)
RBC # BLD AUTO: 4.97 M/UL (ref 4.7–6.1)
SODIUM SERPL-SCNC: 142 MEQ/L (ref 135–144)
TRIGL SERPL-MCNC: 129 MG/DL (ref 0–150)
WBC # BLD AUTO: 9.5 K/UL (ref 4.8–10.8)

## 2024-06-04 PROCEDURE — 99214 OFFICE O/P EST MOD 30 MIN: CPT | Performed by: FAMILY MEDICINE

## 2024-06-04 SDOH — ECONOMIC STABILITY: INCOME INSECURITY: HOW HARD IS IT FOR YOU TO PAY FOR THE VERY BASICS LIKE FOOD, HOUSING, MEDICAL CARE, AND HEATING?: NOT VERY HARD

## 2024-06-04 SDOH — ECONOMIC STABILITY: FOOD INSECURITY: WITHIN THE PAST 12 MONTHS, THE FOOD YOU BOUGHT JUST DIDN'T LAST AND YOU DIDN'T HAVE MONEY TO GET MORE.: NEVER TRUE

## 2024-06-04 SDOH — ECONOMIC STABILITY: FOOD INSECURITY: WITHIN THE PAST 12 MONTHS, YOU WORRIED THAT YOUR FOOD WOULD RUN OUT BEFORE YOU GOT MONEY TO BUY MORE.: NEVER TRUE

## 2024-06-04 ASSESSMENT — PATIENT HEALTH QUESTIONNAIRE - PHQ9
SUM OF ALL RESPONSES TO PHQ QUESTIONS 1-9: 0
2. FEELING DOWN, DEPRESSED OR HOPELESS: NOT AT ALL
1. LITTLE INTEREST OR PLEASURE IN DOING THINGS: NOT AT ALL
SUM OF ALL RESPONSES TO PHQ9 QUESTIONS 1 & 2: 0
SUM OF ALL RESPONSES TO PHQ QUESTIONS 1-9: 0

## 2024-06-04 NOTE — PROGRESS NOTES
Chief Complaint   Patient presents with    Diabetes     6 month     Health Maintenance     Declined colon       HPI:  Maverick Travis II is a 53 y.o. male       6 month DM checkup    Had f/u with vascular, pulm, cardio    Notes reviewed       Lab Results   Component Value Date    LABA1C 6.5 (H) 11/30/2023     No results found for: \"EAG\"          Wt Readings from Last 3 Encounters:   06/04/24 122.7 kg (270 lb 6.4 oz)   05/21/24 122.5 kg (270 lb)   05/02/24 122.5 kg (270 lb)         Patient Active Problem List   Diagnosis    Vasovagal syncope    Diabetes mellitus (HCC)    OLVIN on CPAP    Acute deep vein thrombosis (DVT) of right femoral vein (HCC)    Acute deep vein thrombosis (DVT) of right popliteal vein (HCC)    Superficial thrombophlebitis of right leg    Post-thrombotic syndrome of right lower extremity    Aching leg syndrome of left lower extremity    Iliac vein stenosis, right    PAD (peripheral artery disease) (HCC)    Coronary artery disease involving native coronary artery of native heart with other form of angina pectoris (HCC)    Hypercoagulable state (HCC)    Severe obesity (BMI 35.0-39.9) with comorbidity (HCC)    Claudication (HCC)    Arterial occlusion, lower extremity (HCC)    Centrilobular emphysema (HCC)    COPD, severe (HCC)    Obesity, Class II, BMI 35-39.9    Pain, postoperative, acute    Type 2 diabetes mellitus with hyperglycemia    Personal history of tobacco use       Current Outpatient Medications   Medication Sig Dispense Refill    fluticasone-umeclidin-vilant (TRELEGY ELLIPTA) 100-62.5-25 MCG/ACT AEPB inhaler Inhale 1 puff into the lungs daily 1 each 2    albuterol sulfate HFA (PROVENTIL;VENTOLIN;PROAIR) 108 (90 Base) MCG/ACT inhaler Inhale 2 puffs into the lungs every 6 hours as needed for Wheezing 18 g 5    XARELTO 2.5 MG TABS tablet Take 1 tablet by mouth 2 times daily 60 tablet 5    Blood Glucose Monitoring Suppl (ONE TOUCH ULTRA 2) w/Device KIT 1 KIT BY DOES NOT APPLY ROUTE DAILY AS

## 2024-06-05 LAB
ESTIMATED AVERAGE GLUCOSE: 157 MG/DL
HBA1C MFR BLD: 7.1 % (ref 4–6)

## 2024-07-22 ENCOUNTER — OFFICE VISIT (OUTPATIENT)
Dept: PULMONOLOGY | Age: 54
End: 2024-07-22
Payer: COMMERCIAL

## 2024-07-22 VITALS
HEART RATE: 65 BPM | OXYGEN SATURATION: 95 % | SYSTOLIC BLOOD PRESSURE: 136 MMHG | DIASTOLIC BLOOD PRESSURE: 76 MMHG | WEIGHT: 273 LBS | BODY MASS INDEX: 37.03 KG/M2

## 2024-07-22 DIAGNOSIS — Z87.891 PERSONAL HISTORY OF TOBACCO USE: ICD-10-CM

## 2024-07-22 DIAGNOSIS — G47.33 OSA ON CPAP: Primary | ICD-10-CM

## 2024-07-22 DIAGNOSIS — E66.01 SEVERE OBESITY (BMI 35.0-39.9) WITH COMORBIDITY (HCC): ICD-10-CM

## 2024-07-22 DIAGNOSIS — J44.9 COPD, SEVERE (HCC): ICD-10-CM

## 2024-07-22 PROCEDURE — 99214 OFFICE O/P EST MOD 30 MIN: CPT | Performed by: INTERNAL MEDICINE

## 2024-07-22 ASSESSMENT — ENCOUNTER SYMPTOMS
EYE ITCHING: 0
VOMITING: 0
DIARRHEA: 0
RHINORRHEA: 0
SHORTNESS OF BREATH: 1
VOICE CHANGE: 0
SORE THROAT: 0
NAUSEA: 0
COUGH: 0
CHEST TIGHTNESS: 0
WHEEZING: 0
ABDOMINAL PAIN: 0

## 2024-07-22 NOTE — PROGRESS NOTES
patient.Different ways to help to quit smoking were discussed today.      Return in about 3 months (around 10/22/2024) for COPD, kulwinder.      Corey Astorga MD

## 2024-08-12 RX ORDER — ATENOLOL 25 MG/1
25 TABLET ORAL DAILY
Qty: 90 TABLET | Refills: 3 | Status: SHIPPED | OUTPATIENT
Start: 2024-08-12

## 2024-08-12 NOTE — TELEPHONE ENCOUNTER
Requesting medication refill. Please approve or deny this request.    Rx requested:  Requested Prescriptions     Pending Prescriptions Disp Refills    atenolol (TENORMIN) 25 MG tablet [Pharmacy Med Name: ATENOLOL 25 MG TABLET] 90 tablet 3     Sig: TAKE 1 TABLET BY MOUTH EVERY DAY         Last Office Visit:   5/2/2024      Next Visit Date:  Future Appointments   Date Time Provider Department Center   10/18/2024  8:45 AM Corey Astorga MD [facility]   11/7/2024  2:40 PM Magdalena Lara DO [facility]   12/4/2024  9:00 AM Cheikh Panda MD [facility]               Last refill 8/24/23. Please approve or deny.       no

## 2024-08-16 DIAGNOSIS — E11.9 TYPE 2 DIABETES MELLITUS WITHOUT COMPLICATION, WITHOUT LONG-TERM CURRENT USE OF INSULIN (HCC): ICD-10-CM

## 2024-08-16 RX ORDER — METFORMIN HYDROCHLORIDE 500 MG/1
1000 TABLET, EXTENDED RELEASE ORAL
Qty: 180 TABLET | Refills: 1 | Status: SHIPPED | OUTPATIENT
Start: 2024-08-16

## 2024-08-16 NOTE — TELEPHONE ENCOUNTER
Comments:     Last Office Visit (last PCP visit):   6/4/2024    Next Visit Date:  Future Appointments   Date Time Provider Department Center   10/18/2024  8:45 AM Corey Asotrga MD [facility]   11/7/2024  2:40 PM Magdalena Lara DO [facility]   12/4/2024  9:00 AM Cheikh Panda MD [facility]       **If hasn't been seen in over a year OR hasn't followed up according to last diabetes/ADHD visit, make appointment for patient before sending refill to provider.    Rx requested:  Requested Prescriptions     Pending Prescriptions Disp Refills    metFORMIN (GLUCOPHAGE-XR) 500 MG extended release tablet [Pharmacy Med Name: METFORMIN HCL  MG TABLET] 180 tablet 1     Sig: TAKE 2 TABLETS BY MOUTH DAILY (WITH BREAKFAST).

## 2024-09-30 RX ORDER — RIVAROXABAN 2.5 MG/1
2.5 TABLET, FILM COATED ORAL 2 TIMES DAILY
Qty: 60 TABLET | Refills: 5 | Status: SHIPPED | OUTPATIENT
Start: 2024-09-30

## 2024-09-30 NOTE — TELEPHONE ENCOUNTER
Requesting medication refill. Please approve or deny this request.    Rx requested:  Requested Prescriptions     Pending Prescriptions Disp Refills    XARELTO 2.5 MG TABS tablet [Pharmacy Med Name: XARELTO 2.5 MG TABLET] 60 tablet 5     Sig: TAKE 1 TABLET BY MOUTH TWICE A DAY         Last Office Visit:   5/2/2024      Next Visit Date:  Future Appointments   Date Time Provider Department Center   10/18/2024  8:45 AM Corey Astorga MD [facility]   11/7/2024  2:40 PM Magdalena Lara DO [facility]   12/4/2024  9:00 AM Cheikh Panda MD [facility]               Last refill 03/11/2024. Please approve or deny.

## 2024-10-18 ENCOUNTER — OFFICE VISIT (OUTPATIENT)
Dept: PULMONOLOGY | Age: 54
End: 2024-10-18
Payer: COMMERCIAL

## 2024-10-18 VITALS
BODY MASS INDEX: 37.3 KG/M2 | OXYGEN SATURATION: 95 % | DIASTOLIC BLOOD PRESSURE: 62 MMHG | SYSTOLIC BLOOD PRESSURE: 116 MMHG | HEART RATE: 61 BPM | WEIGHT: 275 LBS

## 2024-10-18 DIAGNOSIS — Z87.891 PERSONAL HISTORY OF TOBACCO USE: ICD-10-CM

## 2024-10-18 DIAGNOSIS — J44.9 COPD, SEVERE (HCC): ICD-10-CM

## 2024-10-18 DIAGNOSIS — G47.33 OSA ON CPAP: Primary | ICD-10-CM

## 2024-10-18 DIAGNOSIS — E66.01 SEVERE OBESITY (BMI 35.0-39.9) WITH COMORBIDITY: ICD-10-CM

## 2024-10-18 PROCEDURE — 99214 OFFICE O/P EST MOD 30 MIN: CPT | Performed by: INTERNAL MEDICINE

## 2024-10-18 RX ORDER — FLUTICASONE FUROATE, UMECLIDINIUM BROMIDE AND VILANTEROL TRIFENATATE 100; 62.5; 25 UG/1; UG/1; UG/1
1 POWDER RESPIRATORY (INHALATION) DAILY
Qty: 1 EACH | Refills: 2 | Status: SHIPPED | OUTPATIENT
Start: 2024-10-18

## 2024-10-18 RX ORDER — ALBUTEROL SULFATE 90 UG/1
2 INHALANT RESPIRATORY (INHALATION) EVERY 6 HOURS PRN
Qty: 18 G | Refills: 5 | Status: SHIPPED | OUTPATIENT
Start: 2024-10-18
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
## 2024-11-07 ENCOUNTER — OFFICE VISIT (OUTPATIENT)
Dept: CARDIOLOGY CLINIC | Age: 54
End: 2024-11-07
Payer: COMMERCIAL

## 2024-11-07 VITALS
BODY MASS INDEX: 37.78 KG/M2 | OXYGEN SATURATION: 94 % | DIASTOLIC BLOOD PRESSURE: 76 MMHG | HEART RATE: 65 BPM | SYSTOLIC BLOOD PRESSURE: 138 MMHG | WEIGHT: 278.6 LBS

## 2024-11-07 DIAGNOSIS — I25.118 CORONARY ARTERY DISEASE INVOLVING NATIVE CORONARY ARTERY OF NATIVE HEART WITH OTHER FORM OF ANGINA PECTORIS (HCC): Primary | ICD-10-CM

## 2024-11-07 PROCEDURE — 93000 ELECTROCARDIOGRAM COMPLETE: CPT | Performed by: INTERNAL MEDICINE

## 2024-11-07 PROCEDURE — 99214 OFFICE O/P EST MOD 30 MIN: CPT | Performed by: INTERNAL MEDICINE

## 2024-11-07 NOTE — PROGRESS NOTES
2024    Cheikh Panda MD  [address], [address]  [facility] [address]    RE: Maverick Travis II   : 1970     Dear Dr. Panda :    As you are well aware, Mr. Travis is a pleasant 54 y.o.  male who returns today fo follow up of severe PAD status post  with 5 total covered stents to the infrarenal abdominal aorta as well as bilateral iliac bifurcations and severe single vessel CAD status post stents x 3 to the RCA. There is residual 60-70% mid Circumflex stenosis and 50-60% mid LAD stenosis, both lesions non obstructive by iFR. Currently he reports feeling \"better\". He denies any chest pain or limiting exertional dyspnea day-to-day activities.  He is looking to start exercising with his wife in the near future.  He reports rare palpitations but no near-syncope or syncope.  He has been compliant with medications including aspirin, Plavix, and Xarelto without any interruption.      7/10/23: Patient with complaints of right leg claudication and discoloration. He has known severe PAD status post  with 5 total covered stents to the infrarenal abdominal aorta as well as bilateral iliac bifurcations. He now reports right leg claudication with ambulation over the last month which is similar to what he had before the stents. The pain in in his posterior calf and anterior thigh. He reports leg edema and mild darkening along the shins and forefoot. He reports compliance with medications including Plavix and Xarelto.  He continues to smoke \"occasionally\".  He is also status post severe single vessel CAD status post stents x 3 to the RCA. There is residual 60-70% mid Circumflex stenosis and 50-60% mid LAD stenosis, both lesions non obstructive by iFR. He denies any chest pain or limiting exertional dyspnea.  He has rare palpitations but no near syncope or syncope.     10/11/23: Patient here for follow up of CAD and PAD. He is status post stents x 3 to the RCA. There is residual 60-70% mid Circumflex

## 2024-11-07 NOTE — PATIENT INSTRUCTIONS
Complete stress test as already recommended.  Continue same medications  Follow up with vascular surgeon routinely.   Follow up in 6 weeks, sooner if needed

## 2024-11-09 DIAGNOSIS — I73.9 PAD (PERIPHERAL ARTERY DISEASE) (HCC): ICD-10-CM

## 2024-11-10 RX ORDER — ATORVASTATIN CALCIUM 80 MG/1
80 TABLET, FILM COATED ORAL DAILY
Qty: 90 TABLET | Refills: 3 | Status: SHIPPED | OUTPATIENT
Start: 2024-11-10

## 2024-11-15 ENCOUNTER — HOSPITAL ENCOUNTER (OUTPATIENT)
Dept: NUCLEAR MEDICINE | Age: 54
Discharge: HOME | End: 2024-11-17
Attending: INTERNAL MEDICINE
Payer: COMMERCIAL

## 2024-11-15 ENCOUNTER — HOSPITAL ENCOUNTER (OUTPATIENT)
Age: 54
Discharge: HOME | End: 2024-11-17
Attending: INTERNAL MEDICINE

## 2024-11-15 VITALS — HEIGHT: 72 IN | BODY MASS INDEX: 36.57 KG/M2 | WEIGHT: 270 LBS

## 2024-11-15 DIAGNOSIS — R06.09 DOE (DYSPNEA ON EXERTION): ICD-10-CM

## 2024-11-15 LAB
ECHO BSA: 2.49 M2
NUC STRESS EJECTION FRACTION: 59 %
STRESS BASELINE DIAS BP: 67 MMHG
STRESS BASELINE HR: 86 BPM
STRESS BASELINE SYS BP: 149 MMHG
STRESS ESTIMATED WORKLOAD: 1 METS
STRESS PEAK DIAS BP: 72 MMHG
STRESS PEAK SYS BP: 152 MMHG
STRESS PERCENT HR ACHIEVED: 52 %
STRESS POST PEAK HR: 86 BPM
STRESS RATE PRESSURE PRODUCT: NORMAL BPM*MMHG
STRESS ST DEPRESSION: 0 MM
STRESS TARGET HR: 165 BPM
TID: 0.99

## 2024-11-15 PROCEDURE — 2580000003 HC RX 258: Performed by: INTERNAL MEDICINE

## 2024-11-15 PROCEDURE — 78452 HT MUSCLE IMAGE SPECT MULT: CPT

## 2024-11-15 PROCEDURE — 93018 CV STRESS TEST I&R ONLY: CPT | Performed by: INTERNAL MEDICINE

## 2024-11-15 PROCEDURE — 6360000002 HC RX W HCPCS: Performed by: INTERNAL MEDICINE

## 2024-11-15 PROCEDURE — 93017 CV STRESS TEST TRACING ONLY: CPT

## 2024-11-15 PROCEDURE — A9502 TC99M TETROFOSMIN: HCPCS | Performed by: INTERNAL MEDICINE

## 2024-11-15 PROCEDURE — 3430000000 HC RX DIAGNOSTIC RADIOPHARMACEUTICAL: Performed by: INTERNAL MEDICINE

## 2024-11-15 RX ORDER — SODIUM CHLORIDE 0.9 % (FLUSH) 0.9 %
10 SYRINGE (ML) INJECTION PRN
Status: COMPLETED | OUTPATIENT
Start: 2024-11-15 | End: 2024-11-15

## 2024-11-15 RX ORDER — REGADENOSON 0.08 MG/ML
0.4 INJECTION, SOLUTION INTRAVENOUS
Status: COMPLETED | OUTPATIENT
Start: 2024-11-15 | End: 2024-11-15

## 2024-11-15 RX ADMIN — TETROFOSMIN 12 MILLICURIE: 1.38 INJECTION, POWDER, LYOPHILIZED, FOR SOLUTION INTRAVENOUS at 08:45

## 2024-11-15 RX ADMIN — Medication 10 ML: at 08:53

## 2024-11-15 RX ADMIN — Medication 10 ML: at 08:54

## 2024-11-15 RX ADMIN — Medication 10 ML: at 07:45

## 2024-11-15 RX ADMIN — REGADENOSON 0.4 MG: 0.08 INJECTION, SOLUTION INTRAVENOUS at 08:53

## 2024-11-15 RX ADMIN — TETROFOSMIN 35.8 MILLICURIE: 1.38 INJECTION, POWDER, LYOPHILIZED, FOR SOLUTION INTRAVENOUS at 08:53

## 2024-11-19 ENCOUNTER — TELEPHONE (OUTPATIENT)
Dept: CARDIOLOGY CLINIC | Age: 54
End: 2024-11-19

## 2024-11-19 NOTE — TELEPHONE ENCOUNTER
----- Message from Dr. Adithya Cruz MD sent at 11/15/2024  4:57 PM EST -----  Please arrange clinic follow-up with  as soon as possible to discuss stress test results

## 2024-11-19 NOTE — TELEPHONE ENCOUNTER
Pt has appt already scheduled for 12/5/24 with Dr Lara.    Is this ok or should we have pt be seen sooner?

## 2024-11-19 NOTE — TELEPHONE ENCOUNTER
Per Dr. Lara, he would like to see patient this Thursday 11/21/2024 320PM.  Called patient to reschedule appointment. Patient confirmed date and time.

## 2024-11-21 ENCOUNTER — OFFICE VISIT (OUTPATIENT)
Dept: CARDIOLOGY CLINIC | Age: 54
End: 2024-11-21
Payer: COMMERCIAL

## 2024-11-21 VITALS
WEIGHT: 279.2 LBS | SYSTOLIC BLOOD PRESSURE: 128 MMHG | OXYGEN SATURATION: 96 % | BODY MASS INDEX: 37.87 KG/M2 | HEART RATE: 63 BPM | DIASTOLIC BLOOD PRESSURE: 70 MMHG

## 2024-11-21 DIAGNOSIS — I25.118 CORONARY ARTERY DISEASE INVOLVING NATIVE CORONARY ARTERY OF NATIVE HEART WITH OTHER FORM OF ANGINA PECTORIS (HCC): ICD-10-CM

## 2024-11-21 DIAGNOSIS — I25.118 CORONARY ARTERY DISEASE INVOLVING NATIVE CORONARY ARTERY OF NATIVE HEART WITH OTHER FORM OF ANGINA PECTORIS (HCC): Primary | ICD-10-CM

## 2024-11-21 DIAGNOSIS — E11.9 TYPE 2 DIABETES MELLITUS WITHOUT COMPLICATION, WITHOUT LONG-TERM CURRENT USE OF INSULIN (HCC): ICD-10-CM

## 2024-11-21 LAB
ANION GAP SERPL CALCULATED.3IONS-SCNC: 10 MEQ/L (ref 9–15)
BUN SERPL-MCNC: 11 MG/DL (ref 6–20)
CALCIUM SERPL-MCNC: 9.2 MG/DL (ref 8.5–9.9)
CHLORIDE SERPL-SCNC: 100 MEQ/L (ref 95–107)
CO2 SERPL-SCNC: 29 MEQ/L (ref 20–31)
CREAT SERPL-MCNC: 0.93 MG/DL (ref 0.7–1.2)
ERYTHROCYTE [DISTWIDTH] IN BLOOD BY AUTOMATED COUNT: 12.2 % (ref 11.5–14.5)
GLUCOSE SERPL-MCNC: 202 MG/DL (ref 70–99)
HCT VFR BLD AUTO: 46.8 % (ref 42–52)
HGB BLD-MCNC: 16.5 G/DL (ref 14–18)
INR PPP: 1
MCH RBC QN AUTO: 33.7 PG (ref 27–31.3)
MCHC RBC AUTO-ENTMCNC: 35.3 % (ref 33–37)
MCV RBC AUTO: 95.7 FL (ref 79–92.2)
PLATELET # BLD AUTO: 207 K/UL (ref 130–400)
POTASSIUM SERPL-SCNC: 4.2 MEQ/L (ref 3.4–4.9)
PROTHROMBIN TIME: 13.4 SEC (ref 12.3–14.9)
RBC # BLD AUTO: 4.89 M/UL (ref 4.7–6.1)
SODIUM SERPL-SCNC: 139 MEQ/L (ref 135–144)
WBC # BLD AUTO: 10.2 K/UL (ref 4.8–10.8)

## 2024-11-21 PROCEDURE — 99214 OFFICE O/P EST MOD 30 MIN: CPT | Performed by: INTERNAL MEDICINE

## 2024-11-21 RX ORDER — ISOSORBIDE MONONITRATE 30 MG/1
30 TABLET, EXTENDED RELEASE ORAL DAILY
Qty: 30 TABLET | Refills: 1 | Status: SHIPPED | OUTPATIENT
Start: 2024-11-21

## 2024-11-21 RX ORDER — SODIUM CHLORIDE 0.9 % (FLUSH) 0.9 %
5-40 SYRINGE (ML) INJECTION PRN
OUTPATIENT
Start: 2024-11-21

## 2024-11-21 RX ORDER — SODIUM CHLORIDE 9 MG/ML
INJECTION, SOLUTION INTRAVENOUS PRN
OUTPATIENT
Start: 2024-11-21

## 2024-11-21 RX ORDER — SODIUM CHLORIDE 0.9 % (FLUSH) 0.9 %
5-40 SYRINGE (ML) INJECTION EVERY 12 HOURS SCHEDULED
OUTPATIENT
Start: 2024-11-21

## 2024-11-21 NOTE — PROGRESS NOTES
2024    Cheikh Panda MD  [address], [address]  [facility] [address]    RE: Maverick Travis II   : 1970     Dear Dr. Panda :    As you are well aware, Mr. Travis is a pleasant 54 y.o.  male who returns today fo follow up of severe PAD status post  with 5 total covered stents to the infrarenal abdominal aorta as well as bilateral iliac bifurcations and severe single vessel CAD status post stents x 3 to the RCA. There is residual 60-70% mid Circumflex stenosis and 50-60% mid LAD stenosis, both lesions non obstructive by iFR. Currently he reports feeling \"better\". He denies any chest pain or limiting exertional dyspnea day-to-day activities.  He is looking to start exercising with his wife in the near future.  He reports rare palpitations but no near-syncope or syncope.  He has been compliant with medications including aspirin, Plavix, and Xarelto without any interruption.      7/10/23: Patient with complaints of right leg claudication and discoloration. He has known severe PAD status post  with 5 total covered stents to the infrarenal abdominal aorta as well as bilateral iliac bifurcations. He now reports right leg claudication with ambulation over the last month which is similar to what he had before the stents. The pain in in his posterior calf and anterior thigh. He reports leg edema and mild darkening along the shins and forefoot. He reports compliance with medications including Plavix and Xarelto.  He continues to smoke \"occasionally\".  He is also status post severe single vessel CAD status post stents x 3 to the RCA. There is residual 60-70% mid Circumflex stenosis and 50-60% mid LAD stenosis, both lesions non obstructive by iFR. He denies any chest pain or limiting exertional dyspnea.  He has rare palpitations but no near syncope or syncope.     10/11/23: Patient here for follow up of CAD and PAD. He is status post stents x 3 to the RCA. There is residual 60-70% mid Circumflex

## 2024-11-21 NOTE — H&P (VIEW-ONLY)
2024    Cheikh Panda MD  [address], [address]  [facility] [address]    RE: Maverick Travis II   : 1970     Dear Dr. Panda :    As you are well aware, Mr. Travis is a pleasant 54 y.o.  male who returns today fo follow up of severe PAD status post  with 5 total covered stents to the infrarenal abdominal aorta as well as bilateral iliac bifurcations and severe single vessel CAD status post stents x 3 to the RCA. There is residual 60-70% mid Circumflex stenosis and 50-60% mid LAD stenosis, both lesions non obstructive by iFR. Currently he reports feeling \"better\". He denies any chest pain or limiting exertional dyspnea day-to-day activities.  He is looking to start exercising with his wife in the near future.  He reports rare palpitations but no near-syncope or syncope.  He has been compliant with medications including aspirin, Plavix, and Xarelto without any interruption.      7/10/23: Patient with complaints of right leg claudication and discoloration. He has known severe PAD status post  with 5 total covered stents to the infrarenal abdominal aorta as well as bilateral iliac bifurcations. He now reports right leg claudication with ambulation over the last month which is similar to what he had before the stents. The pain in in his posterior calf and anterior thigh. He reports leg edema and mild darkening along the shins and forefoot. He reports compliance with medications including Plavix and Xarelto.  He continues to smoke \"occasionally\".  He is also status post severe single vessel CAD status post stents x 3 to the RCA. There is residual 60-70% mid Circumflex stenosis and 50-60% mid LAD stenosis, both lesions non obstructive by iFR. He denies any chest pain or limiting exertional dyspnea.  He has rare palpitations but no near syncope or syncope.     10/11/23: Patient here for follow up of CAD and PAD. He is status post stents x 3 to the RCA. There is residual 60-70% mid Circumflex  with medications.    11/7/2024; Patient here for follow-up of CAD and PAD.  He is status post stents x 3 to the RCA. There is residual 60-70% mid Circumflex stenosis and 50-60% mid LAD stenosis, both lesions non obstructive by iFR.  He is also status post aorto to bifemoral bypass (10/26/2023 at [facility]) due to occluded infrarenal aorta and bilateral iliacs.  He recently returned from [place] in [place].  Currently, he reports dyspnea with more physical degrees of activity but that was chronic and unchanged.  He also reports intermittent left-sided chest pain with some activity described as a \"throbbing\" which was nonradiating and improved with rest.  No palpitations, near-syncope, or syncope.  Unfortunately he has yet to schedule his stress test as ordered several months ago.    11/21/24: Patient here for follow-up of CAD and PAD and recent stress test results.  He is status post stents x 3 to the RCA. There is residual 60-70% mid Circumflex stenosis and 50-60% mid LAD stenosis, both lesions non obstructive by iFR.  He is also status post aorto to bifemoral bypass (10/26/2023 at [facility]) due to occluded infrarenal aorta and bilateral iliacs.  Currently he reports episodes of left-sided chest discomfort that come and go.  He has dyspnea with more physical degrees of activity but this is chronic and unchanged.  No palpitations, near-syncope, or syncope.  His recent stress test was abnormal showing a significant reversible defect in the inferior wall suggestive of possible RCA restenosis.      Current medications:   Current Outpatient Medications   Medication Sig Dispense Refill    isosorbide mononitrate (IMDUR) 30 MG extended release tablet Take 1 tablet by mouth daily 30 tablet 1    atorvastatin (LIPITOR) 80 MG tablet TAKE 1 TABLET BY MOUTH EVERY DAY 90 tablet 3    albuterol sulfate HFA (PROVENTIL;VENTOLIN;PROAIR) 108 (90 Base) MCG/ACT inhaler Inhale 2 puffs into the lungs every 6 hours as needed for Wheezing 18 g 5

## 2024-11-21 NOTE — PATIENT INSTRUCTIONS
Cardiac catheterization to be arranged with Dr. Acuña.  Start isosorbide 30 mg daily. If you get a headache, try Tylenol. If it persists, notify as we may need to adjust dose.   Avoid any intense physical activity until after the cardiac catheterization.   Get blood work done as soon as possible.  Follow up to be determined after the procedure.   If symptoms worsen, go to ER!

## 2024-11-22 RX ORDER — METFORMIN HYDROCHLORIDE 500 MG/1
1000 TABLET, EXTENDED RELEASE ORAL
Qty: 180 TABLET | Refills: 1 | Status: SHIPPED | OUTPATIENT
Start: 2024-11-22

## 2024-11-22 NOTE — TELEPHONE ENCOUNTER
Comments:     Last Office Visit (last PCP visit):   6/4/2024    Next Visit Date:  Future Appointments   Date Time Provider Department Center   12/4/2024  9:00 AM Cheikh Panda MD [facility]   2/18/2025  8:45 AM Corey Astorga MD [facility]       **If hasn't been seen in over a year OR hasn't followed up according to last diabetes/ADHD visit, make appointment for patient before sending refill to provider.    Rx requested:  Requested Prescriptions     Pending Prescriptions Disp Refills    metFORMIN (GLUCOPHAGE-XR) 500 MG extended release tablet [Pharmacy Med Name: METFORMIN HCL  MG TABLET] 180 tablet 1     Sig: TAKE 2 TABLETS BY MOUTH DAILY (WITH BREAKFAST).

## 2024-11-26 ENCOUNTER — CLINICAL DOCUMENTATION (OUTPATIENT)
Dept: CARDIOLOGY CLINIC | Age: 54
End: 2024-11-26

## 2024-12-03 ENCOUNTER — TELEPHONE (OUTPATIENT)
Dept: CARDIOLOGY CLINIC | Age: 54
End: 2024-12-03

## 2024-12-03 DIAGNOSIS — R94.39 ABNORMAL STRESS TEST: Primary | ICD-10-CM

## 2024-12-03 NOTE — TELEPHONE ENCOUNTER
Patient's wife aware procedure scheduled for 12/12/2024@8am. Arrive at 7am. Npo after midnight. Hold metformin and xarelto day prior and will need a

## 2024-12-04 ENCOUNTER — OFFICE VISIT (OUTPATIENT)
Dept: FAMILY MEDICINE CLINIC | Age: 54
End: 2024-12-04

## 2024-12-04 VITALS
WEIGHT: 277.2 LBS | BODY MASS INDEX: 37.55 KG/M2 | OXYGEN SATURATION: 96 % | HEIGHT: 72 IN | DIASTOLIC BLOOD PRESSURE: 80 MMHG | HEART RATE: 79 BPM | SYSTOLIC BLOOD PRESSURE: 124 MMHG

## 2024-12-04 DIAGNOSIS — Z86.718 HISTORY OF DEEP VEIN THROMBOSIS (DVT) OF LOWER EXTREMITY: ICD-10-CM

## 2024-12-04 DIAGNOSIS — Z23 NEED FOR INFLUENZA VACCINATION: ICD-10-CM

## 2024-12-04 DIAGNOSIS — I25.118 CORONARY ARTERY DISEASE INVOLVING NATIVE CORONARY ARTERY OF NATIVE HEART WITH OTHER FORM OF ANGINA PECTORIS (HCC): ICD-10-CM

## 2024-12-04 DIAGNOSIS — I73.9 PAD (PERIPHERAL ARTERY DISEASE) (HCC): ICD-10-CM

## 2024-12-04 DIAGNOSIS — E11.9 TYPE 2 DIABETES MELLITUS WITHOUT COMPLICATION, WITHOUT LONG-TERM CURRENT USE OF INSULIN (HCC): Primary | ICD-10-CM

## 2024-12-04 DIAGNOSIS — Z23 NEED FOR VACCINATION AGAINST STREPTOCOCCUS PNEUMONIAE: ICD-10-CM

## 2024-12-04 DIAGNOSIS — I87.001 POST-THROMBOTIC SYNDROME OF RIGHT LOWER EXTREMITY: ICD-10-CM

## 2024-12-04 DIAGNOSIS — J44.9 COPD, SEVERE (HCC): ICD-10-CM

## 2024-12-04 DIAGNOSIS — G47.33 OSA ON CPAP: ICD-10-CM

## 2024-12-04 DIAGNOSIS — E11.9 TYPE 2 DIABETES MELLITUS WITHOUT COMPLICATION, WITHOUT LONG-TERM CURRENT USE OF INSULIN (HCC): ICD-10-CM

## 2024-12-04 NOTE — PROGRESS NOTES
Chief Complaint   Patient presents with    6 Month Follow-Up     No refills need. No concerns.   Pt continues on all medications as prescribed denies any side effects.     Health Maintenance     [facility] states completed a couple yrs ago   Flu- ordered        HPI:  Maverick Travis II is a 54 y.o. male       6 month DM checkup    Had f/u with vascular, pulm, cardio    Notes reviewed     Will have heart cath 12/12  Had abnormal stress test      Lab Results   Component Value Date    LABA1C 7.1 (H) 06/04/2024     Lab Results   Component Value Date     06/04/2024             Wt Readings from Last 3 Encounters:   12/04/24 125.7 kg (277 lb 3.2 oz)   11/21/24 126.6 kg (279 lb 3.2 oz)   11/15/24 122.5 kg (270 lb)         Patient Active Problem List   Diagnosis    Vasovagal syncope    Diabetes mellitus (HCC)    OLVIN on CPAP    Acute deep vein thrombosis (DVT) of right femoral vein (HCC)    Acute deep vein thrombosis (DVT) of right popliteal vein ([facility])    Superficial thrombophlebitis of right leg    Post-thrombotic syndrome of right lower extremity    Aching leg syndrome of left lower extremity    Iliac vein stenosis, right    PAD (peripheral artery disease) (HCC)    Coronary artery disease involving native coronary artery of native heart with other form of angina pectoris (HCC)    Hypercoagulable state ([facility])    Severe obesity (BMI 35.0-39.9) with comorbidity    Claudication (HCC)    Arterial occlusion, lower extremity (HCC)    Centrilobular emphysema (HCC)    COPD, severe (HCC)    Obesity, Class II, BMI 35-39.9    Pain, postoperative, acute    Type 2 diabetes mellitus with hyperglycemia    Personal history of tobacco use    Abnormal stress test       Current Outpatient Medications   Medication Sig Dispense Refill    metFORMIN (GLUCOPHAGE-XR) 500 MG extended release tablet TAKE 2 TABLETS BY MOUTH DAILY (WITH BREAKFAST). 180 tablet 1    isosorbide mononitrate (IMDUR) 30 MG extended release tablet Take 1 tablet by mouth

## 2024-12-04 NOTE — PROGRESS NOTES
.After obtaining consent, and per orders of Dr. Panda, injection of FLU given in RT deltoid prevnar  given in LT deltoid by Kindra Arita MA. Patient instructed to remain in clinic for 20 minutes afterwards, and to report any adverse reaction to me immediately. Tolerated well

## 2024-12-05 LAB
ESTIMATED AVERAGE GLUCOSE: 200 MG/DL
HBA1C MFR BLD: 8.6 % (ref 4–6)

## 2024-12-12 ENCOUNTER — HOSPITAL ENCOUNTER (OUTPATIENT)
Age: 54
Setting detail: OUTPATIENT SURGERY
Discharge: HOME | End: 2024-12-12
Attending: INTERNAL MEDICINE | Admitting: INTERNAL MEDICINE
Payer: COMMERCIAL

## 2024-12-12 VITALS
BODY MASS INDEX: 37.52 KG/M2 | HEIGHT: 72 IN | HEART RATE: 64 BPM | RESPIRATION RATE: 26 BRPM | WEIGHT: 277 LBS | SYSTOLIC BLOOD PRESSURE: 119 MMHG | DIASTOLIC BLOOD PRESSURE: 62 MMHG | TEMPERATURE: 98.4 F | OXYGEN SATURATION: 92 %

## 2024-12-12 DIAGNOSIS — R94.39 ABNORMAL STRESS TEST: ICD-10-CM

## 2024-12-12 LAB — ECHO BSA: 2.53 M2

## 2024-12-12 PROCEDURE — 99152 MOD SED SAME PHYS/QHP 5/>YRS: CPT | Performed by: INTERNAL MEDICINE

## 2024-12-12 PROCEDURE — 2709999900 HC NON-CHARGEABLE SUPPLY: Performed by: INTERNAL MEDICINE

## 2024-12-12 PROCEDURE — C1894 INTRO/SHEATH, NON-LASER: HCPCS | Performed by: INTERNAL MEDICINE

## 2024-12-12 PROCEDURE — 6360000004 HC RX CONTRAST MEDICATION: Performed by: INTERNAL MEDICINE

## 2024-12-12 PROCEDURE — 93458 L HRT ARTERY/VENTRICLE ANGIO: CPT | Performed by: INTERNAL MEDICINE

## 2024-12-12 PROCEDURE — C1769 GUIDE WIRE: HCPCS | Performed by: INTERNAL MEDICINE

## 2024-12-12 PROCEDURE — 6370000000 HC RX 637 (ALT 250 FOR IP): Performed by: INTERNAL MEDICINE

## 2024-12-12 PROCEDURE — 7100000011 HC PHASE II RECOVERY - ADDTL 15 MIN: Performed by: INTERNAL MEDICINE

## 2024-12-12 PROCEDURE — 7100000010 HC PHASE II RECOVERY - FIRST 15 MIN: Performed by: INTERNAL MEDICINE

## 2024-12-12 PROCEDURE — 6360000002 HC RX W HCPCS: Performed by: INTERNAL MEDICINE

## 2024-12-12 PROCEDURE — 2580000003 HC RX 258: Performed by: INTERNAL MEDICINE

## 2024-12-12 RX ORDER — SODIUM CHLORIDE 0.9 % (FLUSH) 0.9 %
5-40 SYRINGE (ML) INJECTION PRN
Status: DISCONTINUED | OUTPATIENT
Start: 2024-12-12 | End: 2024-12-12 | Stop reason: HOSPADM

## 2024-12-12 RX ORDER — HEPARIN SODIUM 1000 [USP'U]/ML
INJECTION, SOLUTION INTRAVENOUS; SUBCUTANEOUS PRN
Status: DISCONTINUED | OUTPATIENT
Start: 2024-12-12 | End: 2024-12-12 | Stop reason: HOSPADM

## 2024-12-12 RX ORDER — FENTANYL CITRATE 50 UG/ML
INJECTION, SOLUTION INTRAMUSCULAR; INTRAVENOUS PRN
Status: DISCONTINUED | OUTPATIENT
Start: 2024-12-12 | End: 2024-12-12 | Stop reason: HOSPADM

## 2024-12-12 RX ORDER — SODIUM CHLORIDE 9 MG/ML
INJECTION, SOLUTION INTRAVENOUS PRN
Status: DISCONTINUED | OUTPATIENT
Start: 2024-12-12 | End: 2024-12-12 | Stop reason: HOSPADM

## 2024-12-12 RX ORDER — CLOPIDOGREL BISULFATE 75 MG/1
75 TABLET ORAL ONCE
Status: COMPLETED | OUTPATIENT
Start: 2024-12-12 | End: 2024-12-12

## 2024-12-12 RX ORDER — HEPARIN SODIUM 200 [USP'U]/100ML
INJECTION, SOLUTION INTRAVENOUS CONTINUOUS PRN
Status: COMPLETED | OUTPATIENT
Start: 2024-12-12 | End: 2024-12-12

## 2024-12-12 RX ORDER — IOPAMIDOL 612 MG/ML
INJECTION, SOLUTION INTRAVASCULAR PRN
Status: DISCONTINUED | OUTPATIENT
Start: 2024-12-12 | End: 2024-12-12 | Stop reason: HOSPADM

## 2024-12-12 RX ORDER — SODIUM CHLORIDE 0.9 % (FLUSH) 0.9 %
5-40 SYRINGE (ML) INJECTION EVERY 12 HOURS SCHEDULED
Status: DISCONTINUED | OUTPATIENT
Start: 2024-12-12 | End: 2024-12-12 | Stop reason: HOSPADM

## 2024-12-12 RX ORDER — ACETAMINOPHEN 325 MG/1
650 TABLET ORAL EVERY 4 HOURS PRN
Status: DISCONTINUED | OUTPATIENT
Start: 2024-12-12 | End: 2024-12-12 | Stop reason: HOSPADM

## 2024-12-12 RX ORDER — MIDAZOLAM HYDROCHLORIDE 2 MG/2ML
2 INJECTION, SOLUTION INTRAMUSCULAR; INTRAVENOUS
Status: DISCONTINUED | OUTPATIENT
Start: 2024-12-12 | End: 2024-12-12 | Stop reason: HOSPADM

## 2024-12-12 RX ORDER — NITROGLYCERIN 20 MG/100ML
INJECTION INTRAVENOUS CONTINUOUS PRN
Status: COMPLETED | OUTPATIENT
Start: 2024-12-12 | End: 2024-12-12

## 2024-12-12 RX ORDER — MIDAZOLAM HYDROCHLORIDE 1 MG/ML
INJECTION, SOLUTION INTRAMUSCULAR; INTRAVENOUS PRN
Status: DISCONTINUED | OUTPATIENT
Start: 2024-12-12 | End: 2024-12-12 | Stop reason: HOSPADM

## 2024-12-12 RX ORDER — SODIUM CHLORIDE 9 MG/ML
INJECTION, SOLUTION INTRAVENOUS CONTINUOUS
Status: DISCONTINUED | OUTPATIENT
Start: 2024-12-12 | End: 2024-12-12 | Stop reason: HOSPADM

## 2024-12-12 RX ADMIN — CLOPIDOGREL BISULFATE 75 MG: 75 TABLET ORAL at 07:40

## 2024-12-12 NOTE — PROGRESS NOTES
Right radial is stable with no bleeding or hematoma.  Patient is ready for discharge.  Discharge instructions given and patient verbalizes understanding.  Iv dc'd from left wrist and right hand intact.  Discharged to the care of family

## 2024-12-12 NOTE — DISCHARGE INSTRUCTIONS
No driving, operating heavy machinery or alcohol use x 24 hours.      No bending, pushing, pulling or lifting anything over 5lbs x 48 hours.  Limit stair use for the next 48 hours.    Dressing is to remain on the right wrist until tomorrow after showering.  If bleeding occurs  hold pressure x 20 minutes. If bleeding does not stop continue holding pressure and proceed to the nearest emergency room.  You may keep a bandage over the right groin for one to two days.    No baths, swimming, or hot tub use x 1 week.    Resume activity as tolerated after 48 hours.      Follow up with cardiologist as recommended in 4 weeks    Resume same medications as at home

## 2024-12-12 NOTE — PROGRESS NOTES
Arrived to pre/post from home.  Name and date of birth verified along with allergies.  Orders verified and consents obtained.  Oriented to surroundings.  Attached to the monitor and vitals are stable.  No chest pain or shortness of breath

## 2024-12-12 NOTE — INTERVAL H&P NOTE
Update History & Physical    The patient's History and Physical of November 21, 24 was reviewed with the patient and I examined the patient. There was no change. The surgical site was confirmed by the patient and me.     Plan: The risks, benefits, expected outcome, and alternative to the recommended procedure have been discussed with the patient. Patient understands and wants to proceed with the procedure.     Electronically signed by Dave Acuña DO on 12/12/2024 at 7:16 AM

## 2024-12-12 NOTE — BRIEF OP NOTE
Section of Cardiology  Adult Brief Cardiac Cath Procedure Note        Procedure(s):  LHC, b/l coronary angio    Pre-operative Diagnosis:  angina    H&P Status: Completed and reviewed.     Post-operative Diagnosis:      LV EF of 65%  LM large caliber, normal   LAD large caliber, mild to mod difusse disease. 40-50% in prox to mid. + mid LAD myocardial bridging   CX large caliber, mid 50%  RCA large caliber, patent stents.     Findings:  See full report    Complications:  none    Primary Proceduralist:   Dr.Wes Acuña DO    Plan    RFM  Max med rx    Full procedure note to follow

## 2024-12-12 NOTE — PROGRESS NOTES
Arrived to pre/post from the cath lab and report from Martin MOSLEY.  Quikclot to right wrist with no bleeding or hematoma.  Attached to the monitor and vitals are stable.  No chest pain or shortness of breath

## 2024-12-12 NOTE — PROGRESS NOTES
Sedation Pre- Procedure Evaluation    Patient name: Maverick Travis II  YOB: 1970  MRN: [id number]   Allergies:   Patient has no known allergies.        Type Of Sedation  []local anesthesia  [x]moderate (conscious sedation)  []deep/analgesia      RN Focused History    Yes        No  N/A  []   [x]  Previous problem with airway anesthesia, sedation, or family history of the same    []   [x]  History Of tobacco, alcohol, or substance abuse     [x]   []  Problems associated with stridor, snoring, or sleep apnea    []   [] [x] Pediatric patient, history of premature birth or ventilator support (Moderate Sedation Only)     [x]   [] [] Moderate Sedation: Clear liquids within 6 hours of sedation and NPO within 2 hours    []   [] [x] Deep Sedation:Clear liquids within 6 hours of sedation and NPO within 2 hours      NPO since: since midnight       Vitals, Cardiac Rhythm, Pain:   Vitals  Temp: 98.4 °F (36.9 °C)  Temp Source: Oral  Pulse: 89  Heart Rate Source: Monitor  Respirations: 18  BP: 112/74  MAP (Calculated): 87  Patient Position: Sitting  Cardiac Rhythm: Sinus rhythm  Pain Assessment  Pain Assessment: None - Denies Pain      EKG:  EKG Interpretation: sinus rhythm.      Violette Scale: Activity: Able to move four extremities voluntarily or on command  Respiration: Able to breathe and cough freely  Circulation: Blood pressure within 20% of preanesthesia level  Consciousness: Fully awake  Oxygen: SAO2 > 92% on room air   Violette Score: 10     Activity:  2 - Able to move 4 extremities voluntarily on command  Respiration:  2 - Able to breathe deeply and cough freely  Circulation:  2 - BP+/- 20mmHg of normal  Consciousness:  2 - Fully awake  Oxygen Saturation (color):  2 - Able to maintain oxygen saturation >92% on room air      Prior to Admission medications    Medication Sig Start Date End Date Taking? Authorizing Provider   metFORMIN (GLUCOPHAGE-XR) 500 MG extended release tablet TAKE 2 TABLETS BY MOUTH  DAILY (WITH BREAKFAST). 11/22/24  Yes Cheikh Panda MD   isosorbide mononitrate (IMDUR) 30 MG extended release tablet Take 1 tablet by mouth daily 11/21/24  Yes Magdalena Lara DO   atorvastatin (LIPITOR) 80 MG tablet TAKE 1 TABLET BY MOUTH EVERY DAY 11/10/24  Yes Magdalena Lara DO   albuterol sulfate HFA (PROVENTIL;VENTOLIN;PROAIR) 108 (90 Base) MCG/ACT inhaler Inhale 2 puffs into the lungs every 6 hours as needed for Wheezing 10/18/24  Yes Corey Astorga MD   fluticasone-umeclidin-vilant (TRELEGY ELLIPTA) 100-62.5-25 MCG/ACT AEPB inhaler Inhale 1 puff into the lungs daily 10/18/24  Yes Corey Astorga MD   atenolol (TENORMIN) 25 MG tablet TAKE 1 TABLET BY MOUTH EVERY DAY 8/12/24  Yes Magdalena Lara DO   Blood Glucose Monitoring Suppl (ONE TOUCH ULTRA 2) w/Device KIT 1 KIT BY DOES NOT APPLY ROUTE DAILY AS NEEDED (TEST DAILY) 2/20/24  Yes Cheikh Panda MD   clopidogrel (PLAVIX) 75 MG tablet Take 1 tablet by mouth daily 1/15/24  Yes Magdalena Lara DO   ONE TOUCH LANCETS MISC 1 each by Does not apply route daily 11/14/23  Yes Cheikh Panda MD   blood glucose monitor strips 1 strip by Other route daily Test 1 times a day & as needed for symptoms of irregular blood glucose. 11/14/23  Yes Cheikh Panda MD   ANORO ELLIPTA 62.5-25 MCG/ACT inhaler  9/16/23  Yes Prosper Gutierrez MD   CPAP Machine MISC by Does not apply route 8/31/18  Yes Sita Gutierrez, APRN - CNP   XARELTO 2.5 MG TABS tablet TAKE 1 TABLET BY MOUTH TWICE A DAY 9/30/24   Magdalena Lara DO        Electronically signed by Jill M Gosselin, RN on 12/12/2024 at 7:31 AM

## 2024-12-13 RX ORDER — ISOSORBIDE MONONITRATE 30 MG/1
30 TABLET, EXTENDED RELEASE ORAL DAILY
Qty: 90 TABLET | Refills: 3 | Status: SHIPPED | OUTPATIENT
Start: 2024-12-13

## 2024-12-13 NOTE — TELEPHONE ENCOUNTER
Requesting medication refill. Please approve or deny this request.    Rx requested:  Requested Prescriptions     Pending Prescriptions Disp Refills    isosorbide mononitrate (IMDUR) 30 MG extended release tablet [Pharmacy Med Name: ISOSORBIDE MONONIT ER 30 MG TB] 90 tablet 1     Sig: TAKE 1 TABLET BY MOUTH EVERY DAY         Last Office Visit:   11/21/2024      Next Visit Date:  Future Appointments   Date Time Provider Department Center   2/18/2025  8:45 AM Corey Astorga MD [facility]               Last refill 11/21/2024. Please approve or deny.

## 2024-12-16 LAB — ECHO BSA: 2.53 M2

## 2024-12-23 ENCOUNTER — OFFICE VISIT (OUTPATIENT)
Age: 54
End: 2024-12-23
Payer: COMMERCIAL

## 2024-12-23 VITALS
OXYGEN SATURATION: 93 % | HEART RATE: 81 BPM | WEIGHT: 275 LBS | DIASTOLIC BLOOD PRESSURE: 72 MMHG | HEIGHT: 72 IN | SYSTOLIC BLOOD PRESSURE: 118 MMHG | TEMPERATURE: 97.7 F | BODY MASS INDEX: 37.25 KG/M2

## 2024-12-23 DIAGNOSIS — J20.9 ACUTE BRONCHITIS, UNSPECIFIED ORGANISM: ICD-10-CM

## 2024-12-23 DIAGNOSIS — R05.1 ACUTE COUGH: Primary | ICD-10-CM

## 2024-12-23 PROCEDURE — 99213 OFFICE O/P EST LOW 20 MIN: CPT | Performed by: PHYSICIAN ASSISTANT

## 2024-12-23 RX ORDER — PREDNISONE 20 MG/1
20 TABLET ORAL 2 TIMES DAILY
Qty: 10 TABLET | Refills: 0 | Status: SHIPPED | OUTPATIENT
Start: 2024-12-23 | End: 2024-12-28

## 2024-12-23 RX ORDER — AZITHROMYCIN 250 MG/1
TABLET, FILM COATED ORAL
Qty: 6 TABLET | Refills: 0 | Status: SHIPPED | OUTPATIENT
Start: 2024-12-23 | End: 2025-01-02

## 2024-12-23 ASSESSMENT — ENCOUNTER SYMPTOMS
EYES NEGATIVE: 1
GASTROINTESTINAL NEGATIVE: 1
COUGH: 1
SHORTNESS OF BREATH: 1

## 2024-12-23 NOTE — PROGRESS NOTES
predniSONE (DELTASONE) 20 MG tablet Take 1 tablet by mouth 2 times daily for 5 days 10 tablet 0    azithromycin (ZITHROMAX) 250 MG tablet 2 TABS DAY 1 THEN 1 TAB DAYS 2-5 6 tablet 0    isosorbide mononitrate (IMDUR) 30 MG extended release tablet TAKE 1 TABLET BY MOUTH EVERY DAY 90 tablet 3    metFORMIN (GLUCOPHAGE-XR) 500 MG extended release tablet TAKE 2 TABLETS BY MOUTH DAILY (WITH BREAKFAST). 180 tablet 1    atorvastatin (LIPITOR) 80 MG tablet TAKE 1 TABLET BY MOUTH EVERY DAY 90 tablet 3    albuterol sulfate HFA (PROVENTIL;VENTOLIN;PROAIR) 108 (90 Base) MCG/ACT inhaler Inhale 2 puffs into the lungs every 6 hours as needed for Wheezing 18 g 5    fluticasone-umeclidin-vilant (TRELEGY ELLIPTA) 100-62.5-25 MCG/ACT AEPB inhaler Inhale 1 puff into the lungs daily 1 each 2    XARELTO 2.5 MG TABS tablet TAKE 1 TABLET BY MOUTH TWICE A DAY 60 tablet 5    atenolol (TENORMIN) 25 MG tablet TAKE 1 TABLET BY MOUTH EVERY DAY 90 tablet 3    Blood Glucose Monitoring Suppl (ONE TOUCH ULTRA 2) w/Device KIT 1 KIT BY DOES NOT APPLY ROUTE DAILY AS NEEDED (TEST DAILY) 1 kit 0    clopidogrel (PLAVIX) 75 MG tablet Take 1 tablet by mouth daily 90 tablet 3    ONE TOUCH LANCETS MISC 1 each by Does not apply route daily 100 each 3    blood glucose monitor strips 1 strip by Other route daily Test 1 times a day & as needed for symptoms of irregular blood glucose. 100 strip 3    ANORO ELLIPTA 62.5-25 MCG/ACT inhaler       CPAP Machine MISC by Does not apply route 1 each 0     No current facility-administered medications for this visit.      ALLERGIES     Patient has no known allergies.    FAMILY HISTORY       Family History   Problem Relation Age of Onset    Other Mother         COPD    Diabetes Father           SOCIAL HISTORY       Social History     Socioeconomic History    Marital status:      Spouse name: None    Number of children: None    Years of education: None    Highest education level: None   Tobacco Use    Smoking status:

## 2025-01-09 ENCOUNTER — TELEPHONE (OUTPATIENT)
Dept: CARDIOLOGY CLINIC | Age: 55
End: 2025-01-09

## 2025-01-31 ENCOUNTER — OFFICE VISIT (OUTPATIENT)
Dept: CARDIOLOGY CLINIC | Age: 55
End: 2025-01-31
Payer: COMMERCIAL

## 2025-01-31 VITALS
OXYGEN SATURATION: 95 % | WEIGHT: 274.8 LBS | HEART RATE: 68 BPM | SYSTOLIC BLOOD PRESSURE: 122 MMHG | BODY MASS INDEX: 37.26 KG/M2 | DIASTOLIC BLOOD PRESSURE: 70 MMHG

## 2025-01-31 DIAGNOSIS — I25.118 CORONARY ARTERY DISEASE INVOLVING NATIVE CORONARY ARTERY OF NATIVE HEART WITH OTHER FORM OF ANGINA PECTORIS (HCC): ICD-10-CM

## 2025-01-31 DIAGNOSIS — I73.9 PAD (PERIPHERAL ARTERY DISEASE) (HCC): Primary | ICD-10-CM

## 2025-01-31 PROCEDURE — 99214 OFFICE O/P EST MOD 30 MIN: CPT | Performed by: INTERNAL MEDICINE

## 2025-01-31 NOTE — PATIENT INSTRUCTIONS
Stop isosorbide.   Continue other medications  Follow up with PCP for DM management  Get fasting cholesterol check soon.   Follow up in 1 year, sooner if needed.

## 2025-02-18 ENCOUNTER — OFFICE VISIT (OUTPATIENT)
Dept: PULMONOLOGY | Age: 55
End: 2025-02-18

## 2025-02-18 VITALS
BODY MASS INDEX: 37.02 KG/M2 | SYSTOLIC BLOOD PRESSURE: 132 MMHG | OXYGEN SATURATION: 96 % | DIASTOLIC BLOOD PRESSURE: 84 MMHG | WEIGHT: 273 LBS | HEART RATE: 70 BPM

## 2025-02-18 DIAGNOSIS — J44.9 COPD, SEVERE (HCC): ICD-10-CM

## 2025-02-18 DIAGNOSIS — G47.33 OSA ON CPAP: Primary | ICD-10-CM

## 2025-02-18 DIAGNOSIS — E66.9 OBESITY (BMI 30-39.9): ICD-10-CM

## 2025-02-18 DIAGNOSIS — Z87.891 PERSONAL HISTORY OF TOBACCO USE: ICD-10-CM

## 2025-02-18 ASSESSMENT — ENCOUNTER SYMPTOMS
SORE THROAT: 0
DIARRHEA: 0
CHEST TIGHTNESS: 0
RHINORRHEA: 0
NAUSEA: 0
COUGH: 1
EYE ITCHING: 0
VOICE CHANGE: 0
VOMITING: 0
ABDOMINAL PAIN: 0
WHEEZING: 0
SHORTNESS OF BREATH: 1

## 2025-02-18 NOTE — PROGRESS NOTES
Subjective:             Maverick Travis II is a 54 y.o. male who complains today of:     Chief Complaint   Patient presents with    Follow-up     4m f/u on OLVIN, COPD        HPI  He is using CPAP with  9  centimeters of H2O with heated humidity.  He is using CPAP for about   9-10   hours every night.  He is using CPAP with  nasal  Mask.  He said  sleep is restful with the CPAP use.     I reviewed compliance report with patient regarding CPAP therapy. He is using  CPAP for 30 days out of 30  days  Average usage of days used is 10 hours and 32 min , average AHI 1.0  with CPAP use.          He is getting over cold symptoms felt sick for 2 weeks , now feeling better.   He is on trelegy ellipta 1 puff daily , albuterol HFA prn C/o shortness of breath  with exertion   No Wheezing. C/o Cough not able to bring anything out .  No Hemoptysis.No Chest tightness.   No Chest pain with radiation  or pleuritic pain.No  leg edema.   No orthopnea.No Fever or chills. No Rhinorrhea and postnasal drip.     He has h/o  smoking 2 ppd,  in last 10 yrs , he was smoking cigar. Quit smoking cigar in oct 2023.  PFTs revealed severe obstruction, air trapping and reduced DLCO.      He has PVD on blood thinner xarelto , plavix.     Allergies:  Patient has no known allergies.  Past Medical History:   Diagnosis Date    Diabetes mellitus (HCC)     Kidney stone     Pneumonia     Sleep apnea     Syncope and collapse 5/1/2018    Vasovagal syncope 5/1/2018     Past Surgical History:   Procedure Laterality Date    CARDIAC PROCEDURE N/A 8/30/2023    Peripheral angiography possible intervention via the left common femoral artery. [place] [facility] [facility]    CARDIAC PROCEDURE N/A 12/12/2024    Left heart cath / coronary angiography [place] [facility]    CATARACT REMOVAL WITH IMPLANT Bilateral 2015    IR GUIDED THROMB MECH VEIN  9/10/2021    [place] THROMB MECH VEIN [place] [facility]

## 2025-03-26 ENCOUNTER — TELEPHONE (OUTPATIENT)
Dept: FAMILY MEDICINE CLINIC | Age: 55
End: 2025-03-26

## 2025-03-31 ENCOUNTER — OFFICE VISIT (OUTPATIENT)
Age: 55
End: 2025-03-31
Payer: COMMERCIAL

## 2025-03-31 VITALS
HEIGHT: 72 IN | WEIGHT: 280 LBS | SYSTOLIC BLOOD PRESSURE: 118 MMHG | OXYGEN SATURATION: 94 % | BODY MASS INDEX: 37.93 KG/M2 | DIASTOLIC BLOOD PRESSURE: 68 MMHG | HEART RATE: 62 BPM | TEMPERATURE: 97.1 F

## 2025-03-31 DIAGNOSIS — I73.9 PAD (PERIPHERAL ARTERY DISEASE): ICD-10-CM

## 2025-03-31 DIAGNOSIS — D68.59 HYPERCOAGULABLE STATE: ICD-10-CM

## 2025-03-31 DIAGNOSIS — Z12.11 SCREENING FOR COLON CANCER: ICD-10-CM

## 2025-03-31 DIAGNOSIS — J44.9 COPD, SEVERE (HCC): ICD-10-CM

## 2025-03-31 DIAGNOSIS — G47.33 OSA ON CPAP: ICD-10-CM

## 2025-03-31 DIAGNOSIS — Z76.89 ENCOUNTER TO ESTABLISH CARE WITH NEW DOCTOR: Primary | ICD-10-CM

## 2025-03-31 DIAGNOSIS — Z86.718 HISTORY OF DEEP VEIN THROMBOSIS (DVT) OF LOWER EXTREMITY: ICD-10-CM

## 2025-03-31 DIAGNOSIS — E11.9 TYPE 2 DIABETES MELLITUS WITHOUT COMPLICATION, WITHOUT LONG-TERM CURRENT USE OF INSULIN: ICD-10-CM

## 2025-03-31 DIAGNOSIS — J43.2 CENTRILOBULAR EMPHYSEMA (HCC): ICD-10-CM

## 2025-03-31 DIAGNOSIS — Z87.891 PERSONAL HISTORY OF TOBACCO USE: ICD-10-CM

## 2025-03-31 DIAGNOSIS — I25.118 CORONARY ARTERY DISEASE INVOLVING NATIVE CORONARY ARTERY OF NATIVE HEART WITH OTHER FORM OF ANGINA PECTORIS: ICD-10-CM

## 2025-03-31 LAB — HBA1C MFR BLD: 8.6 %

## 2025-03-31 PROCEDURE — 3052F HG A1C>EQUAL 8.0%<EQUAL 9.0%: CPT | Performed by: STUDENT IN AN ORGANIZED HEALTH CARE EDUCATION/TRAINING PROGRAM

## 2025-03-31 PROCEDURE — G0296 VISIT TO DETERM LDCT ELIG: HCPCS | Performed by: STUDENT IN AN ORGANIZED HEALTH CARE EDUCATION/TRAINING PROGRAM

## 2025-03-31 PROCEDURE — 83036 HEMOGLOBIN GLYCOSYLATED A1C: CPT | Performed by: STUDENT IN AN ORGANIZED HEALTH CARE EDUCATION/TRAINING PROGRAM

## 2025-03-31 PROCEDURE — 99214 OFFICE O/P EST MOD 30 MIN: CPT | Performed by: STUDENT IN AN ORGANIZED HEALTH CARE EDUCATION/TRAINING PROGRAM

## 2025-03-31 RX ORDER — METFORMIN HYDROCHLORIDE 500 MG/1
1000 TABLET, EXTENDED RELEASE ORAL 2 TIMES DAILY WITH MEALS
Qty: 360 TABLET | Refills: 1 | Status: SHIPPED | OUTPATIENT
Start: 2025-03-31 | End: 2025-06-29

## 2025-03-31 SDOH — ECONOMIC STABILITY: FOOD INSECURITY: WITHIN THE PAST 12 MONTHS, YOU WORRIED THAT YOUR FOOD WOULD RUN OUT BEFORE YOU GOT MONEY TO BUY MORE.: NEVER TRUE

## 2025-03-31 SDOH — ECONOMIC STABILITY: FOOD INSECURITY: WITHIN THE PAST 12 MONTHS, THE FOOD YOU BOUGHT JUST DIDN'T LAST AND YOU DIDN'T HAVE MONEY TO GET MORE.: NEVER TRUE

## 2025-03-31 ASSESSMENT — PATIENT HEALTH QUESTIONNAIRE - PHQ9
2. FEELING DOWN, DEPRESSED OR HOPELESS: NOT AT ALL
1. LITTLE INTEREST OR PLEASURE IN DOING THINGS: NOT AT ALL
SUM OF ALL RESPONSES TO PHQ QUESTIONS 1-9: 0

## 2025-03-31 NOTE — PROGRESS NOTES
Subjective  Maverick [facility] II, 54 y.o. male presents today with:  Chief Complaint   Patient presents with    [facility]     Was seeing Dr. Panda prior.     Diabetes     A1c was 8.6 on 12/4/24. Does not check blood sugar at home.     Coronary Artery Disease     Follows with cardiology.     History of DVT     Taking Xarelto as directed. Following with cardiology.     COPD     Feels controlled at this time. Following with pulmonology.     Sleep Apnea     Wearing CPAP nightly. Feels this is beneficial.        History of Present Illness  The patient is a 54-year-old male who presents to [facility].    The last A1c test was conducted approximately 4 months ago, yielding a high result of 8.6. His current medication regimen includes metformin, which he tolerates well without any gastrointestinal side effects. He has not been prescribed any other medications for his condition and expresses a preference against injectable treatments. He also reports difficulty in weight loss.    A history of cardiac issues is noted, including the placement of 3 stents and aortic replacement surgery performed by Dr. Montenegro at the [facility]. Blockages were present in his aorta and both legs, causing hip pain and limited mobility. Following treatment, he is now able to walk. He continues to follow up with the [facility] and has an upcoming appointment in either 06/2025 or 07/2025. Pain has improved, but swelling and numbness in the right leg persist. He is currently on Plavix and Xarelto, which he reports are effective.    A diagnosis of COPD and obstructive sleep apnea  Is managed by Dr. Harris, with good control reported. He uses Trelegy and CPAP nightly or during naps, a regimen followed for 10 to 15 years.    Current medications include atenolol 25 mg for blood pressure management and atorvastatin 80 mg for cholesterol management.    A lung cancer screening was completed in 2024, and smoking cessation occurred in

## 2025-04-06 RX ORDER — CLOPIDOGREL BISULFATE 75 MG/1
75 TABLET ORAL DAILY
Qty: 90 TABLET | Refills: 3 | Status: SHIPPED | OUTPATIENT
Start: 2025-04-06

## 2025-04-11 ENCOUNTER — HOSPITAL ENCOUNTER (OUTPATIENT)
Dept: CT IMAGING | Age: 55
Discharge: HOME | End: 2025-04-13
Attending: STUDENT IN AN ORGANIZED HEALTH CARE EDUCATION/TRAINING PROGRAM
Payer: COMMERCIAL

## 2025-04-11 ENCOUNTER — RESULTS FOLLOW-UP (OUTPATIENT)
Age: 55
End: 2025-04-11

## 2025-04-11 VITALS — WEIGHT: 280 LBS | BODY MASS INDEX: 37.93 KG/M2 | HEIGHT: 72 IN

## 2025-04-11 DIAGNOSIS — Z87.891 PERSONAL HISTORY OF TOBACCO USE: ICD-10-CM

## 2025-04-11 PROCEDURE — 71271 CT THORAX LUNG CANCER SCR C-: CPT

## 2025-04-20 RX ORDER — RIVAROXABAN 2.5 MG/1
2.5 TABLET, FILM COATED ORAL 2 TIMES DAILY
Qty: 180 TABLET | Refills: 2 | Status: SHIPPED | OUTPATIENT
Start: 2025-04-20

## 2025-05-06 ENCOUNTER — OFFICE VISIT (OUTPATIENT)
Age: 55
End: 2025-05-06
Payer: COMMERCIAL

## 2025-05-06 VITALS
WEIGHT: 276 LBS | HEIGHT: 72 IN | SYSTOLIC BLOOD PRESSURE: 106 MMHG | HEART RATE: 56 BPM | DIASTOLIC BLOOD PRESSURE: 70 MMHG | BODY MASS INDEX: 37.38 KG/M2 | TEMPERATURE: 97.8 F | OXYGEN SATURATION: 96 %

## 2025-05-06 DIAGNOSIS — E11.9 TYPE 2 DIABETES MELLITUS WITHOUT COMPLICATION, WITHOUT LONG-TERM CURRENT USE OF INSULIN (HCC): Primary | ICD-10-CM

## 2025-05-06 PROCEDURE — 3052F HG A1C>EQUAL 8.0%<EQUAL 9.0%: CPT | Performed by: STUDENT IN AN ORGANIZED HEALTH CARE EDUCATION/TRAINING PROGRAM

## 2025-05-06 PROCEDURE — 99214 OFFICE O/P EST MOD 30 MIN: CPT | Performed by: STUDENT IN AN ORGANIZED HEALTH CARE EDUCATION/TRAINING PROGRAM

## 2025-05-06 NOTE — PROGRESS NOTES
Subjective  [facility], 54 y.[facility] male presents today with:  Chief Complaint   Patient presents with    Diabetes     A1c was 8.6 on 3/31/25. Does not check blood sugar at home. States while he was taking Metformin XR 1000 mg, twice daily & Jardiance 25 mg, once daily he was having diarrhea & belching with a foul odor. Stopped taking Jardiance 25 mg, once daily & belching subsided. He continued to have diarrhea with taking Metformin XR 1000 mg, twice daily. States he then decreased this to once daily & diarrhea subsided. Would like to discuss other medication options.        History of Present Illness  The patient is a 54-year-old male who presents for follow-up of diabetes and weight management.    He reports experiencing diarrhea as a side effect of Jardiance, which he discontinued after the initial few days of treatment. Additionally, he experienced an unpleasant taste and belching, which have since resolved following the cessation of Jardiance. He has not attempted to combine Jardiance with metformin once daily. He continues to take metformin, which previously caused diarrhea when administered twice daily. However, the frequency of diarrhea has decreased with the reduction of metformin to once daily.    He expresses interest in exploring pharmacological options for weight loss, citing difficulty in losing weight. His weight has been fluctuating, and he aims to reduce his weight to 220 pounds. He has lost 4 pounds since his last visit. No other concerns at this time.       Past Medical History:   Diagnosis Date    Diabetes mellitus (HCC)     Kidney stone     Pneumonia     Sleep apnea     Syncope and collapse 5/1/2018    Vasovagal syncope 5/1/2018     Past Surgical History:   Procedure Laterality Date    CARDIAC PROCEDURE N/A 8/30/2023    Peripheral angiography possible intervention via the left common femoral artery. performed by Dave Acuña DO [place] [facility] [facility]    CARDIAC PROCEDURE N/A 12/12/2024

## 2025-06-01 LAB — NONINV COLON CA DNA+OCC BLD SCRN STL QL: POSITIVE

## 2025-06-02 ENCOUNTER — RESULTS FOLLOW-UP (OUTPATIENT)
Age: 55
End: 2025-06-02

## 2025-06-02 DIAGNOSIS — R19.5 POSITIVE COLORECTAL CANCER SCREENING USING COLOGUARD TEST: Primary | ICD-10-CM

## 2025-06-08 ENCOUNTER — PATIENT MESSAGE (OUTPATIENT)
Age: 55
End: 2025-06-08

## 2025-06-08 DIAGNOSIS — E11.9 TYPE 2 DIABETES MELLITUS WITHOUT COMPLICATION, WITHOUT LONG-TERM CURRENT USE OF INSULIN (HCC): ICD-10-CM

## 2025-06-09 RX ORDER — METFORMIN HYDROCHLORIDE 500 MG/1
1000 TABLET, EXTENDED RELEASE ORAL 2 TIMES DAILY WITH MEALS
Qty: 360 TABLET | Refills: 1 | Status: SHIPPED | OUTPATIENT
Start: 2025-06-09 | End: 2025-09-07

## 2025-06-11 ENCOUNTER — PREP FOR PROCEDURE (OUTPATIENT)
Dept: GASTROENTEROLOGY | Age: 55
End: 2025-06-11

## 2025-06-11 RX ORDER — SODIUM CHLORIDE 9 MG/ML
INJECTION, SOLUTION INTRAVENOUS PRN
Status: CANCELLED | OUTPATIENT
Start: 2025-06-11

## 2025-06-11 RX ORDER — SODIUM CHLORIDE 0.9 % (FLUSH) 0.9 %
5-40 SYRINGE (ML) INJECTION EVERY 12 HOURS SCHEDULED
Status: CANCELLED | OUTPATIENT
Start: 2025-06-11

## 2025-06-11 RX ORDER — SODIUM CHLORIDE 9 MG/ML
INJECTION, SOLUTION INTRAVENOUS CONTINUOUS
Status: CANCELLED | OUTPATIENT
Start: 2025-06-11

## 2025-06-11 RX ORDER — SODIUM CHLORIDE 0.9 % (FLUSH) 0.9 %
5-40 SYRINGE (ML) INJECTION PRN
Status: CANCELLED | OUTPATIENT
Start: 2025-06-11

## 2025-06-16 RX ORDER — POLYETHYLENE GLYCOL 3350, SODIUM CHLORIDE, SODIUM BICARBONATE, POTASSIUM CHLORIDE 420; 11.2; 5.72; 1.48 G/4L; G/4L; G/4L; G/4L
4000 POWDER, FOR SOLUTION ORAL ONCE
Qty: 4000 ML | Refills: 0 | Status: SHIPPED | OUTPATIENT
Start: 2025-06-16 | End: 2025-06-16

## 2025-06-19 ENCOUNTER — OFFICE VISIT (OUTPATIENT)
Age: 55
End: 2025-06-19
Payer: COMMERCIAL

## 2025-06-19 VITALS
HEART RATE: 81 BPM | SYSTOLIC BLOOD PRESSURE: 102 MMHG | DIASTOLIC BLOOD PRESSURE: 64 MMHG | BODY MASS INDEX: 35.4 KG/M2 | OXYGEN SATURATION: 93 % | WEIGHT: 261 LBS | TEMPERATURE: 97.6 F

## 2025-06-19 DIAGNOSIS — J44.9 COPD, SEVERE (HCC): ICD-10-CM

## 2025-06-19 DIAGNOSIS — G47.33 OSA ON CPAP: Primary | ICD-10-CM

## 2025-06-19 DIAGNOSIS — E66.9 OBESITY (BMI 30-39.9): ICD-10-CM

## 2025-06-19 DIAGNOSIS — Z87.891 PERSONAL HISTORY OF TOBACCO USE: ICD-10-CM

## 2025-06-19 PROCEDURE — 99214 OFFICE O/P EST MOD 30 MIN: CPT | Performed by: INTERNAL MEDICINE

## 2025-06-19 RX ORDER — FLUTICASONE FUROATE, UMECLIDINIUM BROMIDE AND VILANTEROL TRIFENATATE 100; 62.5; 25 UG/1; UG/1; UG/1
1 POWDER RESPIRATORY (INHALATION) DAILY
Qty: 1 EACH | Refills: 2 | Status: SHIPPED | OUTPATIENT
Start: 2025-06-19

## 2025-06-19 ASSESSMENT — ENCOUNTER SYMPTOMS
CHEST TIGHTNESS: 0
EYE ITCHING: 0
RHINORRHEA: 0
DIARRHEA: 0
WHEEZING: 1
ABDOMINAL PAIN: 0
VOICE CHANGE: 0
NAUSEA: 0
COUGH: 1
SHORTNESS OF BREATH: 1
SORE THROAT: 0
VOMITING: 0
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
## 2025-06-30 ENCOUNTER — OFFICE VISIT (OUTPATIENT)
Age: 55
End: 2025-06-30
Payer: COMMERCIAL

## 2025-06-30 VITALS
BODY MASS INDEX: 35.21 KG/M2 | HEIGHT: 72 IN | DIASTOLIC BLOOD PRESSURE: 68 MMHG | WEIGHT: 260 LBS | SYSTOLIC BLOOD PRESSURE: 108 MMHG | TEMPERATURE: 98 F | OXYGEN SATURATION: 95 % | HEART RATE: 66 BPM

## 2025-06-30 DIAGNOSIS — Z12.5 PROSTATE CANCER SCREENING: ICD-10-CM

## 2025-06-30 DIAGNOSIS — J44.9 COPD, SEVERE (HCC): ICD-10-CM

## 2025-06-30 DIAGNOSIS — E11.9 TYPE 2 DIABETES MELLITUS WITHOUT COMPLICATION, WITHOUT LONG-TERM CURRENT USE OF INSULIN (HCC): ICD-10-CM

## 2025-06-30 DIAGNOSIS — R53.83 FATIGUE, UNSPECIFIED TYPE: ICD-10-CM

## 2025-06-30 DIAGNOSIS — E11.9 TYPE 2 DIABETES MELLITUS WITHOUT COMPLICATION, WITHOUT LONG-TERM CURRENT USE OF INSULIN (HCC): Primary | ICD-10-CM

## 2025-06-30 DIAGNOSIS — I25.118 CORONARY ARTERY DISEASE INVOLVING NATIVE CORONARY ARTERY OF NATIVE HEART WITH OTHER FORM OF ANGINA PECTORIS: ICD-10-CM

## 2025-06-30 LAB
ALBUMIN SERPL-MCNC: 4 G/DL (ref 3.5–4.6)
ALP SERPL-CCNC: 73 U/L (ref 35–104)
ALT SERPL-CCNC: 19 U/L (ref 0–41)
ANION GAP SERPL CALCULATED.3IONS-SCNC: 11 MEQ/L (ref 9–15)
AST SERPL-CCNC: 17 U/L (ref 0–40)
BASOPHILS # BLD: 0.1 K/UL (ref 0–0.2)
BASOPHILS NFR BLD: 0.6 %
BILIRUB SERPL-MCNC: 0.5 MG/DL (ref 0.2–0.7)
BUN SERPL-MCNC: 10 MG/DL (ref 6–20)
CALCIUM SERPL-MCNC: 9.2 MG/DL (ref 8.5–9.9)
CHLORIDE SERPL-SCNC: 103 MEQ/L (ref 95–107)
CHOLEST SERPL-MCNC: 78 MG/DL (ref 0–199)
CO2 SERPL-SCNC: 27 MEQ/L (ref 20–31)
CREAT SERPL-MCNC: 0.98 MG/DL (ref 0.7–1.2)
CREAT UR-MCNC: 255.4 MG/DL
EOSINOPHIL # BLD: 0.1 K/UL (ref 0–0.7)
EOSINOPHIL NFR BLD: 1.1 %
ERYTHROCYTE [DISTWIDTH] IN BLOOD BY AUTOMATED COUNT: 12.6 % (ref 11.5–14.5)
GLOBULIN SER CALC-MCNC: 2.5 G/DL (ref 2.3–3.5)
GLUCOSE FASTING: 91 MG/DL (ref 70–99)
HCT VFR BLD AUTO: 47.8 % (ref 42–52)
HDLC SERPL-MCNC: 23 MG/DL (ref 40–59)
HGB BLD-MCNC: 16.4 G/DL (ref 14–18)
LDL CHOLESTEROL: 33 MG/DL (ref 0–129)
LYMPHOCYTES # BLD: 3.4 K/UL (ref 1–4.8)
LYMPHOCYTES NFR BLD: 37.3 %
MCH RBC QN AUTO: 33.8 PG (ref 27–31.3)
MCHC RBC AUTO-ENTMCNC: 34.3 % (ref 33–37)
MCV RBC AUTO: 98.6 FL (ref 79–92.2)
MICROALBUMIN UR-MCNC: <1.2 MG/DL
MICROALBUMIN/CREAT UR-RTO: NORMAL MG/G (ref 0–30)
MONOCYTES # BLD: 0.8 K/UL (ref 0.2–0.8)
MONOCYTES NFR BLD: 8.4 %
NEUTROPHILS # BLD: 4.7 K/UL (ref 1.4–6.5)
NEUTS SEG NFR BLD: 52.3 %
PLATELET # BLD AUTO: 193 K/UL (ref 130–400)
POTASSIUM SERPL-SCNC: 4.4 MEQ/L (ref 3.4–4.9)
PROT SERPL-MCNC: 6.5 G/DL (ref 6.3–8)
PSA SERPL-MCNC: 0.42 NG/ML (ref 0–4)
RBC # BLD AUTO: 4.85 M/UL (ref 4.7–6.1)
SODIUM SERPL-SCNC: 141 MEQ/L (ref 135–144)
TRIGLYCERIDE, FASTING: 111 MG/DL (ref 0–150)
WBC # BLD AUTO: 9 K/UL (ref 4.8–10.8)

## 2025-06-30 PROCEDURE — 99214 OFFICE O/P EST MOD 30 MIN: CPT | Performed by: STUDENT IN AN ORGANIZED HEALTH CARE EDUCATION/TRAINING PROGRAM

## 2025-06-30 PROCEDURE — 3052F HG A1C>EQUAL 8.0%<EQUAL 9.0%: CPT | Performed by: STUDENT IN AN ORGANIZED HEALTH CARE EDUCATION/TRAINING PROGRAM

## 2025-06-30 NOTE — PROGRESS NOTES
Subjective  Maverick [facility] II, 54 y.o. male presents today with:  Chief Complaint   Patient presents with    Follow-up    Diabetes     A1c was 8.6 on 3/31/25. Does not check blood sugar at home.     Weight Management     Taking Mounjaro as directed. States he has had some slight diarrhea, but otherwise is doing well.     Fatigue     States he has been feeling very fatigued.        History of Present Illness  The patient is a 54-year-old male who presents for follow-up.    He reports a weight loss of 16 pounds, which he attributes to his current medication regimen. He experiences mild fatigue but is uncertain of the cause. He is tolerating Mounjaro well, with only occasional diarrhea as a side effect. The 2.5 mg dose effectively suppresses his appetite throughout the day, and he is also on metformin 1000 mg twice daily.    His respiratory function is satisfactory, even in hot weather conditions. He uses Trelegy and requires his albuterol inhaler approximately once a week.  He does follow with pulmonology.    He is currently on atorvastatin 80 mg and atenolol 25 mg, both of which he tolerates well.    He has not undergone a PSA test for prostate cancer screening in a significant period.  Due for lab work today.  No other concerns at this time.      Past Medical History:   Diagnosis Date    Diabetes mellitus (HCC)     Kidney stone     Pneumonia     Sleep apnea     Syncope and collapse 5/1/2018    Vasovagal syncope 5/1/2018     Past Surgical History:   Procedure Laterality Date    CARDIAC PROCEDURE N/A 8/30/2023    Peripheral angiography possible intervention via the left common femoral artery. performed by [facility]    CARDIAC PROCEDURE N/A 12/12/2024    Left heart cath / coronary angiography performed by Dave Acuña DO [facility]    CATARACT REMOVAL WITH IMPLANT Bilateral 2015    IR GUIDED THROMB MEC VEIN  9/10/2021    IR THROMB MEC VEIN 9/10/2021 [facility]

## 2025-07-01 ENCOUNTER — LAB (OUTPATIENT)
Age: 55
End: 2025-07-01
Payer: COMMERCIAL

## 2025-07-01 ENCOUNTER — RESULTS FOLLOW-UP (OUTPATIENT)
Age: 55
End: 2025-07-01

## 2025-07-01 DIAGNOSIS — E11.65 TYPE 2 DIABETES MELLITUS WITH HYPERGLYCEMIA, WITHOUT LONG-TERM CURRENT USE OF INSULIN (HCC): Primary | ICD-10-CM

## 2025-07-01 LAB — HBA1C MFR BLD: 7.2 %

## 2025-07-01 PROCEDURE — 83036 HEMOGLOBIN GLYCOSYLATED A1C: CPT | Performed by: STUDENT IN AN ORGANIZED HEALTH CARE EDUCATION/TRAINING PROGRAM

## 2025-07-07 ENCOUNTER — HOSPITAL ENCOUNTER (OUTPATIENT)
Age: 55
Setting detail: OUTPATIENT SURGERY
Discharge: HOME | End: 2025-07-07
Attending: SPECIALIST | Admitting: SPECIALIST
Payer: COMMERCIAL

## 2025-07-07 ENCOUNTER — ANESTHESIA (OUTPATIENT)
Dept: OPERATING ROOM | Age: 55
End: 2025-07-07
Payer: COMMERCIAL

## 2025-07-07 ENCOUNTER — ANESTHESIA EVENT (OUTPATIENT)
Dept: OPERATING ROOM | Age: 55
End: 2025-07-07
Payer: COMMERCIAL

## 2025-07-07 VITALS
HEIGHT: 72 IN | HEART RATE: 56 BPM | BODY MASS INDEX: 35.21 KG/M2 | SYSTOLIC BLOOD PRESSURE: 118 MMHG | RESPIRATION RATE: 18 BRPM | TEMPERATURE: 97.4 F | WEIGHT: 260 LBS | OXYGEN SATURATION: 95 % | DIASTOLIC BLOOD PRESSURE: 72 MMHG

## 2025-07-07 DIAGNOSIS — R19.5 POSITIVE COLORECTAL CANCER SCREENING USING COLOGUARD TEST: ICD-10-CM

## 2025-07-07 LAB
GLUCOSE BLD-MCNC: 98 MG/DL (ref 70–99)
PERFORMED ON: NORMAL

## 2025-07-07 PROCEDURE — 3700000001 HC ADD 15 MINUTES (ANESTHESIA): Performed by: SPECIALIST

## 2025-07-07 PROCEDURE — 2580000003 HC RX 258: Performed by: SPECIALIST

## 2025-07-07 PROCEDURE — 3609027000 HC COLONOSCOPY: Performed by: SPECIALIST

## 2025-07-07 PROCEDURE — 6360000002 HC RX W HCPCS

## 2025-07-07 PROCEDURE — 7100000010 HC PHASE II RECOVERY - FIRST 15 MIN: Performed by: SPECIALIST

## 2025-07-07 PROCEDURE — 88305 TISSUE EXAM BY PATHOLOGIST: CPT

## 2025-07-07 PROCEDURE — 2709999900 HC NON-CHARGEABLE SUPPLY: Performed by: SPECIALIST

## 2025-07-07 PROCEDURE — 2500000003 HC RX 250 WO HCPCS: Performed by: SPECIALIST

## 2025-07-07 PROCEDURE — 2580000003 HC RX 258

## 2025-07-07 PROCEDURE — 7100000011 HC PHASE II RECOVERY - ADDTL 15 MIN: Performed by: SPECIALIST

## 2025-07-07 PROCEDURE — 3700000000 HC ANESTHESIA ATTENDED CARE: Performed by: SPECIALIST

## 2025-07-07 RX ORDER — SODIUM CHLORIDE 0.9 % (FLUSH) 0.9 %
5-40 SYRINGE (ML) INJECTION PRN
Status: DISCONTINUED | OUTPATIENT
Start: 2025-07-07 | End: 2025-07-07 | Stop reason: HOSPADM

## 2025-07-07 RX ORDER — SODIUM CHLORIDE 0.9 % (FLUSH) 0.9 %
5-40 SYRINGE (ML) INJECTION EVERY 12 HOURS SCHEDULED
Status: DISCONTINUED | OUTPATIENT
Start: 2025-07-07 | End: 2025-07-07 | Stop reason: HOSPADM

## 2025-07-07 RX ORDER — SODIUM CHLORIDE 9 MG/ML
INJECTION, SOLUTION INTRAVENOUS CONTINUOUS
Status: DISCONTINUED | OUTPATIENT
Start: 2025-07-07 | End: 2025-07-07 | Stop reason: HOSPADM

## 2025-07-07 RX ORDER — PROPOFOL 10 MG/ML
INJECTION, EMULSION INTRAVENOUS
Status: DISCONTINUED | OUTPATIENT
Start: 2025-07-07 | End: 2025-07-07 | Stop reason: SDUPTHER

## 2025-07-07 RX ORDER — SODIUM CHLORIDE 9 MG/ML
INJECTION, SOLUTION INTRAVENOUS
Status: DISCONTINUED | OUTPATIENT
Start: 2025-07-07 | End: 2025-07-07 | Stop reason: SDUPTHER

## 2025-07-07 RX ORDER — SODIUM CHLORIDE 9 MG/ML
INJECTION, SOLUTION INTRAVENOUS PRN
Status: DISCONTINUED | OUTPATIENT
Start: 2025-07-07 | End: 2025-07-07 | Stop reason: HOSPADM

## 2025-07-07 RX ADMIN — PROPOFOL 50 MG: 10 INJECTION, EMULSION INTRAVENOUS at 08:54

## 2025-07-07 RX ADMIN — PROPOFOL 30 MG: 10 INJECTION, EMULSION INTRAVENOUS at 08:48

## 2025-07-07 RX ADMIN — PROPOFOL 50 MG: 10 INJECTION, EMULSION INTRAVENOUS at 08:59

## 2025-07-07 RX ADMIN — PROPOFOL 10 MG: 10 INJECTION, EMULSION INTRAVENOUS at 08:58

## 2025-07-07 RX ADMIN — PROPOFOL 50 MG: 10 INJECTION, EMULSION INTRAVENOUS at 09:06

## 2025-07-07 RX ADMIN — PROPOFOL 50 MG: 10 INJECTION, EMULSION INTRAVENOUS at 08:56

## 2025-07-07 RX ADMIN — PROPOFOL 30 MG: 10 INJECTION, EMULSION INTRAVENOUS at 08:45

## 2025-07-07 RX ADMIN — SODIUM CHLORIDE: 0.9 INJECTION, SOLUTION INTRAVENOUS at 08:38

## 2025-07-07 RX ADMIN — PROPOFOL 30 MG: 10 INJECTION, EMULSION INTRAVENOUS at 08:51

## 2025-07-07 RX ADMIN — SODIUM CHLORIDE: 0.9 INJECTION, SOLUTION INTRAVENOUS at 07:37

## 2025-07-07 ASSESSMENT — PAIN - FUNCTIONAL ASSESSMENT
PAIN_FUNCTIONAL_ASSESSMENT: 0-10
PAIN_FUNCTIONAL_ASSESSMENT: 0-10

## 2025-07-07 NOTE — ANESTHESIA POSTPROCEDURE EVALUATION
Department of Anesthesiology  Postprocedure Note    Patient: Maverick Travis II  MRN: [id number]  YOB: 1970  Date of evaluation: 7/7/2025    Procedure Summary       Date: 07/07/25 Room / Location: [address]    Anesthesia Start: 0838 Anesthesia Stop: 0912    Procedure: COLONOSCOPY DIAGNOSTIC with polypectomies Diagnosis:       Positive colorectal cancer screening using Cologuard test      (Positive colorectal cancer screening using Cologuard test [R19.5])    Surgeons: Cari Acosta MD Responsible Provider: Fatemeh Elise APRN - CRNA    Anesthesia Type: MAC ASA Status: 3            Anesthesia Type: No value filed.    Violette Phase I: Violette Score: 10    Violette Phase II:      Anesthesia Post Evaluation    Patient location during evaluation: bedside  Patient participation: complete - patient participated  Level of consciousness: awake and awake and alert  Airway patency: patent  Nausea & Vomiting: no nausea and no vomiting  Cardiovascular status: blood pressure returned to baseline and hemodynamically stable  Respiratory status: acceptable  Hydration status: euvolemic  Pain management: adequate        No notable events documented.

## 2025-07-07 NOTE — H&P
Patient Name: Maverick Travis II  : 1970  MRN: [id number]  DATE: 25      ENDOSCOPY  History and Physical    Procedure:    [] Diagnostic Colonoscopy       [x] Screening Colonoscopy  [] EGD      [] ERCP      [] EUS       [] Other    [x] Previous office notes/History and Physical reviewed from the patients chart. Please see EMR for further details of HPI. I have examined the patient's status immediately prior to the procedure and:      Indications/HPI:    []Abdominal Pain  []Cancer- GI/Lung  []Fhx of colon CA/polyps  []History of Polyps  []Singh’s   []Melena  []Abnormal Imaging  []Dysphagia    []Persistent Pneumonia  []Anemia  []Food Impaction  []History of Polyps  []GI Bleed  []Pulmonary nodule/Mass  []Change in bowel habits []Heartburn/Reflux  []Rectal Bleed (BRBPR)  []Chest Pain - Non Cardiac []Heme (+) Stoo  l[]Ulcers  []Constipation  []Hemoptysis   []Varices  []Diarrhea  []Hypoxemia  []Nausea/Vomiting  []Screening   []Crohns/Colitis  []Other: post cologard    Anesthesia:   [x] MAC [] Moderate Sedation   [] General   [] None     ROS: 12 pt Review of Symptoms was negative unless mentioned above    Medications:   Prior to Admission medications    Medication Sig Start Date End Date Taking? Authorizing Provider   fluticasone-umeclidin-vilant (TRELEGY ELLIPTA) 100-62.5-25 MCG/ACT AEPB inhaler Inhale 1 puff into the lungs daily 25  Yes Corey Astorga MD   Tirzepatide (MOUNJARO) 2.5 MG/0.5ML SOAJ pen Inject 2.5 mg into the skin every 7 days 25  Yes Anish Carrizales MD   metFORMIN (GLUCOPHAGE-XR) 500 MG extended release tablet Take 2 tablets by mouth with breakfast and with evening meal 25 Yes Sharlene Linda,    atorvastatin (LIPITOR) 80 MG tablet TAKE 1 TABLET BY MOUTH EVERY DAY 11/10/24  Yes Magdalena Lara, DO   albuterol sulfate HFA (PROVENTIL;VENTOLIN;PROAIR) 108 (90 Base) MCG/ACT inhaler Inhale 2 puffs into the lungs every 6 hours as needed for Wheezing 10/18/24  Yes

## 2025-07-07 NOTE — PROGRESS NOTES
Descending polyps tissue partially retrieved for pathology,Transverse and Sigmoid polyp removed,but not retrieved for pathology, MD aware.

## 2025-07-07 NOTE — ANESTHESIA PRE PROCEDURE
Department of Anesthesiology  Preprocedure Note       Name:  Maverick Travis II   Age:  54 y.o.  :  1970                                          MRN:  [id number]         Date:  2025      Surgeon: Surgeon(s):  Cari Acosta MD    Procedure: Procedure(s):  COLONOSCOPY DIAGNOSTIC    Medications prior to admission:   Prior to Admission medications    Medication Sig Start Date End Date Taking? Authorizing Provider   fluticasone-umeclidin-vilant (TRELEGY ELLIPTA) 100-62.5-25 MCG/ACT AEPB inhaler Inhale 1 puff into the lungs daily 25  Yes Corey Astorga MD   Tirzepatide (MOUNJARO) 2.5 MG/0.5ML SOAJ pen Inject 2.5 mg into the skin every 7 days 25  Yes Anish Carrizales MD   metFORMIN (GLUCOPHAGE-XR) 500 MG extended release tablet Take 2 tablets by mouth with breakfast and with evening meal 25 Yes Sharlene Linda,    atorvastatin (LIPITOR) 80 MG tablet TAKE 1 TABLET BY MOUTH EVERY DAY 11/10/24  Yes Magdalena Lara, DO   albuterol sulfate HFA (PROVENTIL;VENTOLIN;PROAIR) 108 (90 Base) MCG/ACT inhaler Inhale 2 puffs into the lungs every 6 hours as needed for Wheezing 10/18/24  Yes Corey Astorga MD   atenolol (TENORMIN) 25 MG tablet TAKE 1 TABLET BY MOUTH EVERY DAY 24  Yes Magdalena Lara, DO   CPAP Machine MISC by Does not apply route 18  Yes Sita Gutierrez, APRN - CNP   XARELTO 2.5 MG TABS tablet TAKE 1 TABLET BY MOUTH TWICE A DAY 25   Magdalena Lara DO   clopidogrel (PLAVIX) 75 MG tablet TAKE 1 TABLET BY MOUTH EVERY DAY 25   Magdalena Lara, DO   Blood Glucose Monitoring Suppl (ONE TOUCH ULTRA 2) w/Device KIT 1 KIT BY DOES NOT APPLY ROUTE DAILY AS NEEDED (TEST DAILY) 24   Cheikh Panda MD   ONE TOUCH LANCETS MISC 1 each by Does not apply route daily 23   Cheikh Panda MD   blood glucose monitor strips 1 strip by Other route daily Test 1 times a day & as needed for symptoms of irregular blood glucose. 23   Cheikh Panda MD       Current

## 2025-07-28 DIAGNOSIS — E11.9 TYPE 2 DIABETES MELLITUS WITHOUT COMPLICATION, WITHOUT LONG-TERM CURRENT USE OF INSULIN (HCC): ICD-10-CM

## 2025-08-04 RX ORDER — ATENOLOL 25 MG/1
25 TABLET ORAL DAILY
Qty: 90 TABLET | Refills: 1 | Status: SHIPPED | OUTPATIENT
Start: 2025-08-04

## 2025-08-08 DIAGNOSIS — E11.9 TYPE 2 DIABETES MELLITUS WITHOUT COMPLICATION, WITHOUT LONG-TERM CURRENT USE OF INSULIN (HCC): ICD-10-CM

## 2025-08-28 DIAGNOSIS — J44.9 COPD, SEVERE (HCC): ICD-10-CM

## 2025-08-29 ENCOUNTER — TELEPHONE (OUTPATIENT)
Age: 55
End: 2025-08-29

## 2025-08-29 RX ORDER — ALBUTEROL SULFATE 90 UG/1
INHALANT RESPIRATORY (INHALATION)
Qty: 6.7 EACH | Refills: 3 | Status: SHIPPED | OUTPATIENT
Start: 2025-08-29

## (undated) DEVICE — TUBING, SUCTION, 9/32" X 12', STRAIGHT: Brand: MEDLINE INDUSTRIES, INC.

## (undated) DEVICE — GLOVE ORANGE PI 7 1/2   MSG9075

## (undated) DEVICE — SUPPLEMENT DIGESTIVE H2O SOL GI-EASE

## (undated) DEVICE — CATHETER DIAG 5FR L100CM LUMN ID0.047IN JL3.5 CRV 0 SIDE H

## (undated) DEVICE — INTRODUCER SHTH 4FR CANN L11CM DIL TIP 25MM RED TUNGSTEN

## (undated) DEVICE — CATHETER COR DIAG PIGTAILS PIG 145 CRV 5FR 110CM 6 SIDE H

## (undated) DEVICE — CATHETER ANGIO 4FR L100CM S STL NYL IM 3 SEG BRAID SFT

## (undated) DEVICE — SNARE ENDO CAPTIVATOR W10MM X L240CM RND INSUL STIFF-UOM BOX OF 10

## (undated) DEVICE — TUBING IRRIGATION 140/160/180/190 SER GI ENDOSCP SMARTCAP

## (undated) DEVICE — CONTAINER,SPECIMEN,OR STERILE,4OZ: Brand: MEDLINE

## (undated) DEVICE — SINGLE PORT MANIFOLD: Brand: NEPTUNE 2

## (undated) DEVICE — GLIDESHEATH SLENDER STAINLESS STEEL KIT: Brand: GLIDESHEATH SLENDER

## (undated) DEVICE — KIT MFLD ISOLATN NACL CNTRST PRT TBNG SPIK W/ PRSS TRNSDUC

## (undated) DEVICE — GUIDEWIRE VASC .035IN X 260CM ANGL TIP STIFF

## (undated) DEVICE — INTRODUCER SHTH 0.018 IN 4 FRX40 CM KT SFT TIP NIT VSI 7266V

## (undated) DEVICE — KIT ANGIO W/ AT P65 PREM HND CTRL FOR CNTRST DEL ANGIOTOUCH

## (undated) DEVICE — Device

## (undated) DEVICE — PACK PROCEDURE SURG SURG CARDIAC CATH

## (undated) DEVICE — GUIDEWIRE VASC L260CM DIA0.035IN TIP L3MM STD EXCHG PTFE J

## (undated) DEVICE — FORCEPS BX L240CM JAW DIA2.4MM ORNG L CAP W/ NDL DISP RAD

## (undated) DEVICE — TRAP POLYP BALEEN

## (undated) DEVICE — ELECTRODE PT RET AD L9FT HI MOIST COND ADH HYDRGEL CORDED

## (undated) DEVICE — DRESSING QUIKCLOT FEMORAL INTERVENTIONAL 1.5X1.5 IN

## (undated) DEVICE — ELECTRODE TRAIN EDGE SYS W/ QUIK-COMBO CONN

## (undated) DEVICE — CATHETER ANGIO 4FR L110CM S STL NYL STR PGTL W/ 6 SIDE H

## (undated) DEVICE — ENDO CARRY-ON PROCEDURE KIT INCLUDES LUBRICANT, DEFENDO OLYMPUS AIR, WATER, SUCTION, BIOPSY VALVE KIT, ENZYMATIC SPONGE, AND BASIN.: Brand: ENDO CARRY-ON PROCEDURE KIT

## (undated) DEVICE — DRAPE SURG BRACH STRL

## (undated) DEVICE — GLOVE SURG SZ 6 THK91MIL LTX FREE SYN POLYISOPRENE ANTI

## (undated) DEVICE — 3M™ TEGADERM™ TRANSPARENT FILM DRESSING FRAME STYLE, 1626W, 4 IN X 4-3/4 IN (10 CM X 12 CM), 50/CT 4CT/CASE: Brand: 3M™ TEGADERM™

## (undated) DEVICE — DRESSING QUIKCLOT RADIAL 0.8X1.5 IN W/ADHESIVE

## (undated) DEVICE — SHEET,DRAPE,53X77,STERILE: Brand: MEDLINE

## (undated) DEVICE — CATHETER ANGIO INFIN 038IN AMPLATZ 534545T

## (undated) DEVICE — BRUSH ENDO CLN L90.5IN SHTH DIA1.7MM BRIST DIA5-7MM 2-6MM

## (undated) DEVICE — CATHETER ANGIO INFIN 038IN AMPLATZ 534543T

## (undated) DEVICE — TUBE SET 96 MM 64 MM H2O PERISTALTIC STD AUX CHANNEL

## (undated) DEVICE — GUIDEWIRE VASC L180CM DIA0.035IN 3MM PTFE J TIP EXCHG FIX